# Patient Record
Sex: MALE | Race: WHITE
[De-identification: names, ages, dates, MRNs, and addresses within clinical notes are randomized per-mention and may not be internally consistent; named-entity substitution may affect disease eponyms.]

---

## 2018-06-15 ENCOUNTER — HOSPITAL ENCOUNTER (OUTPATIENT)
Dept: HOSPITAL 61 - KCIC | Age: 43
Discharge: HOME | End: 2018-06-15
Attending: NURSE PRACTITIONER
Payer: COMMERCIAL

## 2018-06-15 DIAGNOSIS — M12.88: ICD-10-CM

## 2018-06-15 DIAGNOSIS — M48.02: Primary | ICD-10-CM

## 2018-06-15 PROCEDURE — 72072 X-RAY EXAM THORAC SPINE 3VWS: CPT

## 2018-06-15 PROCEDURE — 72050 X-RAY EXAM NECK SPINE 4/5VWS: CPT

## 2018-09-21 ENCOUNTER — HOSPITAL ENCOUNTER (OUTPATIENT)
Dept: HOSPITAL 61 - KCIC MRI | Age: 43
Discharge: HOME | End: 2018-09-21
Attending: NURSE PRACTITIONER
Payer: COMMERCIAL

## 2018-09-21 DIAGNOSIS — M12.88: ICD-10-CM

## 2018-09-21 DIAGNOSIS — M48.02: ICD-10-CM

## 2018-09-21 DIAGNOSIS — M47.896: ICD-10-CM

## 2018-09-21 DIAGNOSIS — M47.892: ICD-10-CM

## 2018-09-21 DIAGNOSIS — M47.894: ICD-10-CM

## 2018-09-21 DIAGNOSIS — M51.24: Primary | ICD-10-CM

## 2018-09-21 DIAGNOSIS — M48.04: ICD-10-CM

## 2018-09-21 PROCEDURE — 72146 MRI CHEST SPINE W/O DYE: CPT

## 2018-09-21 NOTE — [facility]
EXAM: Thoracic spine MRI without contrast.

 

HISTORY: Pain.

 

TECHNIQUE: Multiplanar, multisequence magnetic resonance imaging of the 

thoracic spine was performed without contrast.

 

COMPARISON: Radiographs dated 6/15/2018.

 

FINDINGS: There is minimal thoracic curvature without significant 

scoliosis. There is no significant thoracic listhesis. The vertebral 

bodies are normal in height. There is edema due to endplate remodeling 

anteriorly at T9, T10 and L1. There are few small endplate Schmorl's 

nodes. There are few tiny osseous hemangiomas. No thoracic spinal cord 

lesion is seen. The conus terminates at L1-L2.

 

There is degenerative endplate remodeling with disc space narrowing, 

osteophytosis and a posterior disc protrusion with slight suspected 

inferior extrusion at C5-C6. The combination of this finding and facet and

uncovertebral arthropathy results in foraminal and central canal stenosis 

with deformation of the spinal cord. There are few additional disc bulges 

and protrusions at the cervical levels which are not well assessed on this

exam due to dedicated thoracic images.

 

There is degenerative change throughout the lumbar spine. There are 6 

nonrib-bearing lumbar vertebral segments. There are disc bulges or shallow

protrusions and there is endplate remodeling at multiple levels. This is 

also not formally assessed on this exam.

 

At T2-T3, there is minimal left facet arthropathy. There is no stenosis.

 

At T3-T4, there is minimal bilateral facet arthropathy. There is no 

stenosis.

 

At T4-T5, there is mild left greater than right facet arthropathy. There 

is no stenosis.

 

At T5-T6, there is mild bilateral facet arthropathy. There is no stenosis.

 

At T6-T7, there is a minimal disc bulge. There is mild bilateral facet 

arthropathy. There is no stenosis.

 

At T7-T8, there is a disc bulge and endplate remodeling. There is mild 

facet arthropathy. There is no stenosis.

 

At T8-T9, there is a left paracentral disc protrusion with slight inferior

extrusion. There is flattening of the left ventral aspect of the spinal 

cord without significant central canal stenosis. There is mild bilateral 

facet arthropathy. There is no significant foraminal stenosis.

 

At T9-T10, there is a right paracentral disc protrusion. This slightly 

flattens the right ventral aspect of the spinal cord without significant 

stenosis. There is mild bilateral facet arthropathy. There is no 

significant foraminal stenosis.

 

At T10-T11, there is a shallow right paracentral disc protrusion which 

flattens the right ventral aspect of the spinal cord. There is mild 

bilateral facet arthropathy. There is minimal central canal stenosis.

 

At T11-T12, there is a diffuse disc bulge. There is mild bilateral facet 

arthropathy. There is minimal central canal stenosis.

 

At T12-L1, there is a right paracentral disc protrusion superimposed on a 

disc bulge. There is mild bilateral facet arthropathy. There is minimal 

central canal stenosis.

 

IMPRESSION: 

1. Multilevel degenerative change within the thoracic spine, described in 

detail above. There are disc protrusions at multiple levels which slightly

deforms the ventral aspect of the spinal cord and contributing to minimal 

central canal stenosis. No severe stenosis or spinal cord signal 

abnormality is seen at the thoracic levels.

2. Multilevel degenerative change within the cervical spine, primarily at 

C5-C6. This results in foraminal and central canal stenosis with 

deformation of the spinal cord. This is not formally assessed on this 

exam.

3. Multilevel degenerative change within the lumbar spine. This is also 

not formally assessed on this exam. There are incidentally 6 

nonrib-bearing lumbar vertebral segments.

 

Electronically signed by: Loraine Cruz MD (9/21/2018 4:20 PM) [facility]

## 2018-10-02 ENCOUNTER — HOSPITAL ENCOUNTER (INPATIENT)
Dept: HOSPITAL 61 - ER | Age: 43
LOS: 2 days | Discharge: HOME | DRG: 637 | End: 2018-10-04
Attending: INTERNAL MEDICINE | Admitting: INTERNAL MEDICINE
Payer: SELF-PAY

## 2018-10-02 VITALS — DIASTOLIC BLOOD PRESSURE: 64 MMHG | SYSTOLIC BLOOD PRESSURE: 126 MMHG

## 2018-10-02 VITALS — BODY MASS INDEX: 28.51 KG/M2 | WEIGHT: 222.12 LBS | HEIGHT: 74 IN

## 2018-10-02 VITALS — DIASTOLIC BLOOD PRESSURE: 85 MMHG | SYSTOLIC BLOOD PRESSURE: 141 MMHG

## 2018-10-02 VITALS — SYSTOLIC BLOOD PRESSURE: 152 MMHG | DIASTOLIC BLOOD PRESSURE: 106 MMHG

## 2018-10-02 DIAGNOSIS — F10.20: ICD-10-CM

## 2018-10-02 DIAGNOSIS — E87.6: ICD-10-CM

## 2018-10-02 DIAGNOSIS — F41.9: ICD-10-CM

## 2018-10-02 DIAGNOSIS — Z87.891: ICD-10-CM

## 2018-10-02 DIAGNOSIS — G89.21: ICD-10-CM

## 2018-10-02 DIAGNOSIS — M62.82: ICD-10-CM

## 2018-10-02 DIAGNOSIS — E86.0: ICD-10-CM

## 2018-10-02 DIAGNOSIS — J30.9: ICD-10-CM

## 2018-10-02 DIAGNOSIS — K52.9: ICD-10-CM

## 2018-10-02 DIAGNOSIS — Z82.49: ICD-10-CM

## 2018-10-02 DIAGNOSIS — F12.90: ICD-10-CM

## 2018-10-02 DIAGNOSIS — N17.0: ICD-10-CM

## 2018-10-02 DIAGNOSIS — K57.90: ICD-10-CM

## 2018-10-02 DIAGNOSIS — E78.00: ICD-10-CM

## 2018-10-02 DIAGNOSIS — E78.5: ICD-10-CM

## 2018-10-02 DIAGNOSIS — E83.42: ICD-10-CM

## 2018-10-02 DIAGNOSIS — I10: ICD-10-CM

## 2018-10-02 DIAGNOSIS — E11.65: Primary | ICD-10-CM

## 2018-10-02 DIAGNOSIS — K21.9: ICD-10-CM

## 2018-10-02 LAB
% LYMPHS: 15 % (ref 24–48)
% MONOS: 2 % (ref 0–10)
% SEGS: 79 % (ref 35–66)
ALBUMIN SERPL-MCNC: 4.7 G/DL (ref 3.4–5)
ALBUMIN/GLOB SERPL: 1 {RATIO} (ref 1–1.7)
ALP SERPL-CCNC: 65 U/L (ref 46–116)
ALT SERPL-CCNC: 34 U/L (ref 16–63)
AMPHETAMINE/METHAMPHETAMINE: (no result)
ANION GAP SERPL CALC-SCNC: 11 MMOL/L (ref 6–14)
ANION GAP SERPL CALC-SCNC: 18 MMOL/L (ref 6–14)
APTT PPP: (no result) S
AST SERPL-CCNC: 34 U/L (ref 15–37)
BACTERIA #/AREA URNS HPF: (no result) /HPF
BARBITURATES UR-MCNC: (no result) UG/ML
BASOPHILS # BLD AUTO: 0.1 X10^3/UL (ref 0–0.2)
BASOPHILS NFR BLD AUTO: 2 % (ref 0–3)
BASOPHILS NFR BLD: 0 % (ref 0–3)
BENZODIAZ UR-MCNC: (no result) UG/L
BILIRUB SERPL-MCNC: 0.9 MG/DL (ref 0.2–1)
BILIRUB UR QL STRIP: NEGATIVE
BUN SERPL-MCNC: 25 MG/DL (ref 8–26)
BUN SERPL-MCNC: 30 MG/DL (ref 8–26)
BUN/CREAT SERPL: 18 (ref 6–20)
CALCIUM SERPL-MCNC: 10 MG/DL (ref 8.5–10.1)
CALCIUM SERPL-MCNC: 8.8 MG/DL (ref 8.5–10.1)
CANNABINOIDS UR-MCNC: (no result) UG/L
CHLORIDE SERPL-SCNC: 101 MMOL/L (ref 98–107)
CHLORIDE SERPL-SCNC: 94 MMOL/L (ref 98–107)
CK SERPL-CCNC: 1745 U/L (ref 39–308)
CO2 SERPL-SCNC: 27 MMOL/L (ref 21–32)
CO2 SERPL-SCNC: 29 MMOL/L (ref 21–32)
COCAINE UR-MCNC: (no result) NG/ML
CREAT SERPL-MCNC: 1.3 MG/DL (ref 0.7–1.3)
CREAT SERPL-MCNC: 1.7 MG/DL (ref 0.7–1.3)
EOSINOPHIL NFR BLD AUTO: 2 % (ref 0–5)
EOSINOPHIL NFR BLD: 0.1 X10^3/UL (ref 0–0.7)
EOSINOPHIL NFR BLD: 1 % (ref 0–3)
ERYTHROCYTE [DISTWIDTH] IN BLOOD BY AUTOMATED COUNT: 13.6 % (ref 11.5–14.5)
FIBRINOGEN PPP-MCNC: CLEAR MG/DL
GFR SERPLBLD BASED ON 1.73 SQ M-ARVRAT: 44 ML/MIN
GFR SERPLBLD BASED ON 1.73 SQ M-ARVRAT: 60 ML/MIN
GLOBULIN SER-MCNC: 4.7 G/DL (ref 2.2–3.8)
GLUCOSE SERPL-MCNC: 153 MG/DL (ref 70–99)
GLUCOSE SERPL-MCNC: 239 MG/DL (ref 70–99)
HCT VFR BLD CALC: 45 % (ref 39–53)
HGB BLD-MCNC: 15.9 G/DL (ref 13–17.5)
LIPASE: 428 U/L (ref 73–393)
LYMPHOCYTES # BLD: 2.6 X10^3/UL (ref 1–4.8)
LYMPHOCYTES NFR BLD AUTO: 15 % (ref 24–48)
MAGNESIUM SERPL-MCNC: 0.3 MG/DL (ref 1.8–2.4)
MAGNESIUM SERPL-MCNC: 1 MG/DL (ref 1.8–2.4)
MCH RBC QN AUTO: 34 PG (ref 25–35)
MCHC RBC AUTO-ENTMCNC: 35 G/DL (ref 31–37)
MCV RBC AUTO: 95 FL (ref 79–100)
METHADONE SERPL-MCNC: (no result) NG/ML
MONO #: 1.3 X10^3/UL (ref 0–1.1)
MONOCYTES NFR BLD: 8 % (ref 0–9)
NEUT #: 13 X10^3UL (ref 1.8–7.7)
NEUTROPHILS NFR BLD AUTO: 76 % (ref 31–73)
NITRITE UR QL STRIP: NEGATIVE
OPIATES UR-MCNC: (no result) NG/ML
PCP SERPL-MCNC: (no result) MG/DL
PH UR STRIP: 6 [PH]
PLATELET # BLD AUTO: 424 X10^3/UL (ref 140–400)
PLATELET # BLD EST: (no result) 10*3/UL
POTASSIUM SERPL-SCNC: 2.6 MMOL/L (ref 3.5–5.1)
POTASSIUM SERPL-SCNC: 3.2 MMOL/L (ref 3.5–5.1)
PROT SERPL-MCNC: 9.4 G/DL (ref 6.4–8.2)
PROT UR STRIP-MCNC: 100 MG/DL
RBC # BLD AUTO: 4.74 X10^6/UL (ref 4.3–5.7)
RBC #/AREA URNS HPF: 0 /HPF (ref 0–2)
SODIUM SERPL-SCNC: 139 MMOL/L (ref 136–145)
SODIUM SERPL-SCNC: 141 MMOL/L (ref 136–145)
UROBILINOGEN UR-MCNC: 1 MG/DL
WBC # BLD AUTO: 17.1 X10^3/UL (ref 4–11)
WBC #/AREA URNS HPF: (no result) /HPF (ref 0–4)

## 2018-10-02 PROCEDURE — 80307 DRUG TEST PRSMV CHEM ANLYZR: CPT

## 2018-10-02 PROCEDURE — 82550 ASSAY OF CK (CPK): CPT

## 2018-10-02 PROCEDURE — 80048 BASIC METABOLIC PNL TOTAL CA: CPT

## 2018-10-02 PROCEDURE — 83690 ASSAY OF LIPASE: CPT

## 2018-10-02 PROCEDURE — 80053 COMPREHEN METABOLIC PANEL: CPT

## 2018-10-02 PROCEDURE — G0479 DRUG TEST PRESUMP NOT OPT: HCPCS

## 2018-10-02 PROCEDURE — 85007 BL SMEAR W/DIFF WBC COUNT: CPT

## 2018-10-02 PROCEDURE — 84484 ASSAY OF TROPONIN QUANT: CPT

## 2018-10-02 PROCEDURE — 36415 COLL VENOUS BLD VENIPUNCTURE: CPT

## 2018-10-02 PROCEDURE — 74177 CT ABD & PELVIS W/CONTRAST: CPT

## 2018-10-02 PROCEDURE — 96365 THER/PROPH/DIAG IV INF INIT: CPT

## 2018-10-02 PROCEDURE — 71250 CT THORAX DX C-: CPT

## 2018-10-02 PROCEDURE — 83036 HEMOGLOBIN GLYCOSYLATED A1C: CPT

## 2018-10-02 PROCEDURE — 71045 X-RAY EXAM CHEST 1 VIEW: CPT

## 2018-10-02 PROCEDURE — 93005 ELECTROCARDIOGRAM TRACING: CPT

## 2018-10-02 PROCEDURE — 96375 TX/PRO/DX INJ NEW DRUG ADDON: CPT

## 2018-10-02 PROCEDURE — 85025 COMPLETE CBC W/AUTO DIFF WBC: CPT

## 2018-10-02 PROCEDURE — 83735 ASSAY OF MAGNESIUM: CPT

## 2018-10-02 PROCEDURE — 82962 GLUCOSE BLOOD TEST: CPT

## 2018-10-02 PROCEDURE — 84100 ASSAY OF PHOSPHORUS: CPT

## 2018-10-02 PROCEDURE — 96361 HYDRATE IV INFUSION ADD-ON: CPT

## 2018-10-02 PROCEDURE — 81001 URINALYSIS AUTO W/SCOPE: CPT

## 2018-10-02 RX ADMIN — POTASSIUM CHLORIDE SCH MLS/HR: 7.46 INJECTION, SOLUTION INTRAVENOUS at 19:11

## 2018-10-02 RX ADMIN — LOSARTAN POTASSIUM SCH MG: 50 TABLET ORAL at 20:30

## 2018-10-02 RX ADMIN — HYDROCODONE BITARTRATE AND ACETAMINOPHEN PRN TAB: 5; 325 TABLET ORAL at 22:28

## 2018-10-02 RX ADMIN — HEPARIN SODIUM SCH UNIT: 5000 INJECTION, SOLUTION INTRAVENOUS; SUBCUTANEOUS at 21:26

## 2018-10-02 RX ADMIN — METOPROLOL SUCCINATE SCH MG: 25 TABLET, EXTENDED RELEASE ORAL at 20:29

## 2018-10-02 RX ADMIN — HEPARIN SODIUM SCH UNIT: 5000 INJECTION, SOLUTION INTRAVENOUS; SUBCUTANEOUS at 16:03

## 2018-10-02 RX ADMIN — INSULIN LISPRO SCH UNITS: 100 INJECTION, SOLUTION INTRAVENOUS; SUBCUTANEOUS at 17:00

## 2018-10-02 RX ADMIN — POTASSIUM CHLORIDE SCH MLS/HR: 7.46 INJECTION, SOLUTION INTRAVENOUS at 20:22

## 2018-10-02 RX ADMIN — POTASSIUM CHLORIDE SCH MLS/HR: 7.46 INJECTION, SOLUTION INTRAVENOUS at 21:20

## 2018-10-02 RX ADMIN — CYCLOBENZAPRINE HYDROCHLORIDE PRN MG: 10 TABLET, FILM COATED ORAL at 22:28

## 2018-10-02 RX ADMIN — POTASSIUM CHLORIDE SCH MLS/HR: 7.46 INJECTION, SOLUTION INTRAVENOUS at 15:51

## 2018-10-02 RX ADMIN — POTASSIUM PHOSPHATE, MONOBASIC AND POTASSIUM PHOSPHATE, DIBASIC SCH MLS/HR: 224; 236 INJECTION, SOLUTION INTRAVENOUS at 22:29

## 2018-10-02 RX ADMIN — BENZONATATE SCH MG: 100 CAPSULE, LIQUID FILLED ORAL at 20:29

## 2018-10-02 RX ADMIN — ATORVASTATIN CALCIUM SCH MG: 20 TABLET, FILM COATED ORAL at 20:30

## 2018-10-02 NOTE — RAD
CT ABD PELV W/ IV CONTRST ONLY

 

Indication: DIFFUSE ABDOMINAL PAIN. IV OMNI 300 75 MLS. PREVIOUS.

 

Exposure: One or more of the following individualized dose reduction 

techniques were utilized for this examination:  1. Automated exposure 

control  2. Adjustment of the mA and/or kV according to patient size  3. 

Use of iterative reconstruction technique.

 

Comparison: Axial images from June 13, 2007

Contrast: Intravenous contrast was given. No oral contrast per request.

 

FINDINGS:

Lower thorax: There are 3 flat nodules in the right lower lobe just above 

the diaphragm, largest measures 14 mm in diameter. Not seen on prior 

study.

 

Liver: Unremarkable

Spleen: Unremarkable

Pancreas: Unremarkable

 

Adrenals: No evidence of mass.

Kidneys: No obvious mass.

Urinary tracts: No hydronephrosis.

 

Gallbladder: No calcified stone

Lymph nodes: No significant enlargement

 

Vessels:

*  Aorta: Nonaneurysmal

*  Major aortic branches: Grossly patent.

*  Portal venous: Patent

 

GI tract: No evidence of acute colitis. No evidence of bowel obstruction. 

Colonic diverticula Appendix is normal. Small density within the appendix 

probably contrast from a prior procedure or less likely small 

appendicoliths.

 

Reproductive organs:No evidence of mass.

Urinary bladder: Unremarkable.

Peritoneum: No evidence of pneumoperitoneum. No free fluid.

Abdominal wall:Unremarkable

 

Spine: Mild degenerative spondylosis.

Bones: No destructive process identified.

 

IMPRESSION: 

1. Several flat nodules in the right lower lobe, 14 mm diameter. Not 

present in 2007. As per Fleischner Society guidelines, consider follow-up 

CT chest in 3 months or PET/CT.

2. No acute findings in the abdomen or pelvis.

 

Electronically signed by: Phan Worley MD (10/2/2018 12:20 PM) [facility]

## 2018-10-02 NOTE — EKG
[facility]

              [address] [place] [address]

Test Date:    2018-10-02               Test Time:    09:52:37

Pat Name:     RORY MONTEZ             Department:   

Patient ID:   [id number]           Room:          

Gender:                               Technician:   SC

:          1975               Requested By: RORY WAY

Order Number: [id number]           Reading MD:   Gregg Benjamin MD

                                 Measurements

Intervals                              Axis          

Rate:         74                       P:            52

TN:           146                      QRS:          24

QRSD:         82                       T:            41

QT:           400                                    

QTc:          449                                    

                           Interpretive Statements

SINUS RHYTHM

T ABNORMALITY IN ANTERIOR LEADS

NON SPECIFIC ST DEPRESSION

ABNORMAL ECG



Electronically Signed On 10-2-2018 10:38:42 CDT by Gregg Benjamin MD

## 2018-10-02 NOTE — CONS
DATE OF CONSULTATION:  



PULMONARY CONSULTATION



ATTENDING PHYSICIAN:  Dr. Staton.



REASON FOR CONSULTATION:  Lung nodule.



HISTORY OF PRESENT ILLNESS:  The patient is a 43-year-old who has a history of

diabetes and hypertension.  He came to the Emergency Room with nausea, vomiting

and weakness.  The patient underwent CT abdomen and pelvis, which was reviewed

by me.  There are few irregular nodules seen in the right lower lobe.  The

largest of them is 14 mm.  He has no known malignancy.  He has no evidence of

any malignancy in the abdomen and pelvis.  The patient smoked for about 10 years

before quitting.  I have been asked to see him for further evaluation.  No

chronic cough, no hemoptysis.  No headaches.  No focal weakness.  No skin rash.



PAST MEDICAL HISTORY:  Significant for history of diabetes and hypertension.



PAST SURGICAL HISTORY:  Joint surgery.



FAMILY HISTORY:  Hypertension.



SOCIAL HISTORY:  Smoked for about 10 years and history of alcohol abuse.



ALLERGIES:  None.



MEDICATIONS:  Reviewed as listed in the MRAD.



REVIEW OF SYSTEMS:  As discussed in my history of present illness, otherwise

noncontributory.  All systems that were negative were reviewed as well.



PHYSICAL EXAMINATION:

VITAL SIGNS:  Stable.  Pulse ox 100% on room air.

NECK:  Supple.

LUNGS:  Clear.

CARDIOVASCULAR:  Regular rate.

ABDOMEN:  Soft.

EXTREMITIES:  With no pitting edema.



LABORATORY DATA:  Reviewed.  White cell count 17.1, hemoglobin 15.9.  Potassium

was 2.6.  It has been corrected.  BUN and creatinine 30 and 1.7.



IMPRESSION:

1.  Right lower lobe irregular nodules seen on the CT abdomen and pelvis. 

Largest of them 14 mm in size.  This is a patient who is at low risk for lung

malignancy, smoked only for 10 years and no other evidence of any malignancy

outside of lungs.  These nodules need to be followed up.  I will obtain a full

CT chest to have a better view of the entire chest and to look for other

nodules.

2.  Diabetes, under poor control.

3.  Hypokalemia.

4.  Acute kidney injury secondary to dehydration.



RECOMMENDATIONS:

1.  Obtain noncontrast CT chest.

2.  If there are no other additional nodules or any larger nodules, then a

followup CT chest in 4 months would be reasonable.

3.  Management of diabetes and electrolyte imbalance per PCP.

 



______________________________

ENID DUKE MD



DR:  JAVID/isaias  JOB#:  [id number] / [id number]

DD:  10/02/2018 15:31  DT:  10/02/2018 15:51
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
## 2018-10-02 NOTE — PDOC2
GI CONSULT


Reason For Consult:


N/v





HPI:


HPI:


42 y/o male admitted through ER.  Began feeling ill on Friday - just didn't 

feel right, didn't eat much.  Denies precipitating events.  Persisted to 

Saturday, then ate steak for dinner, then started w/ n/v.  Unable to take much 

PO since, still vomiting some liquids (last in ER).  Associated w/ diffuse 

abdominal soreness (worse w/ movement, "cramping"), and also had diarrhea x 2 (

watery) on Sunday and Monday.  Feels hot and sweaty sometimes.  No hematemesis, 

hematochezia, or melena.  





H/o GERD on OTC Prilosec QD.  No dysphagia.  Prior to onset of symptoms, 

appetite and weight stable.  Has a "weak stomach" - has diarrhea from time to 

time but typically no issues w/ abd pain or n/v.  H/o DM - has had trouble 

tolerating PO medications for this, so has been off all diabetic treatment for 

about 1 month - says he has an online coupon for a new medication from PCP.  Can

't remember what last A1c was, but thinks "better."  Not checking blood sugar 

at home because the batteries are dead in his machine.  No GB, liver, or 

pancreas history.





Significant labs: WBC 17, plt 424, K 2.6, BUN 30, Cr 1.7, glucose 239, CK 1745, 

lipase 428.


CT unrevealing for acute issue, though noted RLL nodules, diverticulosis, and 

small density within the appendix (?contrast from a prior procedure or less 

likely small appendicolith).





PMH:


PMH:


HTN, HLD, DM, GERD, diverticulosis, allergic rhinitis, DDD, right jaw/foot/knee 

surgeries





Social History:


Smoke:  No


ALCOHOL:  other (can't specify - drinks 4 days a week, sometimes "a couple" and 

sometimes "gets drunk")


Drugs:  None





ROS:





GEN: +sweats


HEENT: Denies blurred vision, sore throat


CV: Denies chest pain


RESP: Denies shortness of air, cough


GI: Per HPI


: Denies hematuria, dysuria


ENDO: Denies weight changes


NEURO: Denies confusion, dizziness


MSK: Denies weakness, joint pain/swelling


SKIN: Denies jaundice, pruritus





Vitals:


Vitals:





 Vital Signs








  Date Time  Temp Pulse Resp B/P (MAP) Pulse Ox O2 Delivery O2 Flow Rate FiO2


 


10/2/18 13:34  80 16  100   


 


10/2/18 09:35 98.4   136/78 (97)  Room Air  





 98.4       











Labs:


Labs:





Laboratory Tests








Test


 10/2/18


09:41 10/2/18


10:20 10/2/18


10:25


 


Glucose (Fingerstick)


 242 mg/dL


(70-99) 


 





 


Urine Collection Type  Unknown  


 


Urine Color  Shazia  


 


Urine Clarity  Clear  


 


Urine pH  6.0  


 


Urine Specific Gravity  1.025  


 


Urine Protein


 


 100 mg/dL


(NEG-TRACE) 





 


Urine Glucose (UA)


 


 100 mg/dL


(NEG) 





 


Urine Ketones (Stick)


 


 Negative mg/dL


(NEG) 





 


Urine Blood  Negative (NEG)  


 


Urine Nitrite  Negative (NEG)  


 


Urine Bilirubin  Negative (NEG)  


 


Urine Urobilinogen Dipstick


 


 1.0 mg/dL (0.2


mg/dL) 





 


Urine Leukocyte Esterase  Negative (NEG)  


 


Urine RBC  0 /HPF (0-2)  


 


Urine WBC


 


 5-10 /HPF


(0-4) 





 


Urine Bacteria


 


 Mod /HPF


(0-FEW) 





 


White Blood Count


 


 


 17.1 x10^3/uL


(4.0-11.0)


 


Red Blood Count


 


 


 4.74 x10^6/uL


(4.30-5.70)


 


Hemoglobin


 


 


 15.9 g/dL


(13.0-17.5)


 


Hematocrit


 


 


 45.0 %


(39.0-53.0)


 


Mean Corpuscular Volume   95 fL () 


 


Mean Corpuscular Hemoglobin   34 pg (25-35) 


 


Mean Corpuscular Hemoglobin


Concent 


 


 35 g/dL


(31-37)


 


Red Cell Distribution Width


 


 


 13.6 %


(11.5-14.5)


 


Platelet Count


 


 


 424 x10^3/uL


(140-400)


 


Neutrophils (%) (Auto)   76 % (31-73) 


 


Lymphocytes (%) (Auto)   15 % (24-48) 


 


Monocytes (%) (Auto)   8 % (0-9) 


 


Eosinophils (%) (Auto)   1 % (0-3) 


 


Basophils (%) (Auto)   0 % (0-3) 


 


Neutrophils # (Auto)


 


 


 13.0 x10^3uL


(1.8-7.7)


 


Lymphocytes # (Auto)


 


 


 2.6 x10^3/uL


(1.0-4.8)


 


Monocytes # (Auto)


 


 


 1.3 x10^3/uL


(0.0-1.1)


 


Eosinophils # (Auto)


 


 


 0.1 x10^3/uL


(0.0-0.7)


 


Basophils # (Auto)


 


 


 0.1 x10^3/uL


(0.0-0.2)


 


Segmented Neutrophils %   79 % (35-66) 


 


Lymphocytes %   15 % (24-48) 


 


Monocytes %   2 % (0-10) 


 


Eosinophils %   2 % (0-5) 


 


Basophils %   2 % (0-3) 


 


Platelet Estimate


 


 


 Increased


(ADEQUATE)


 


Large Platelets   Few 


 


Giant Platelets   Occ 


 


Sodium Level


 


 


 139 mmol/L


(136-145)


 


Potassium Level


 


 


 2.6 mmol/L


(3.5-5.1)


 


Chloride Level


 


 


 94 mmol/L


()


 


Carbon Dioxide Level


 


 


 27 mmol/L


(21-32)


 


Anion Gap   18 (6-14) 


 


Blood Urea Nitrogen


 


 


 30 mg/dL


(8-26)


 


Creatinine


 


 


 1.7 mg/dL


(0.7-1.3)


 


Estimated GFR


(Cockcroft-Gault) 


 


 44.0 





 


BUN/Creatinine Ratio   18 (6-20) 


 


Glucose Level


 


 


 239 mg/dL


(70-99)


 


Calcium Level


 


 


 10.0 mg/dL


(8.5-10.1)


 


Magnesium Level


 


 


 0.3 mg/dL


(1.8-2.4)


 


Total Bilirubin


 


 


 0.9 mg/dL


(0.2-1.0)


 


Aspartate Amino Transf


(AST/SGOT) 


 


 34 U/L (15-37) 





 


Alanine Aminotransferase


(ALT/SGPT) 


 


 34 U/L (16-63) 





 


Alkaline Phosphatase


 


 


 65 U/L


()


 


Creatine Kinase


 


 


 1745 U/L


()


 


Troponin I Quantitative


 


 


 < 0.017 ng/mL


(0.000-0.055)


 


Total Protein


 


 


 9.4 g/dL


(6.4-8.2)


 


Albumin


 


 


 4.7 g/dL


(3.4-5.0)


 


Albumin/Globulin Ratio   1.0 (1.0-1.7) 


 


Lipase


 


 


 428 U/L


()











Allergies:


Coded Allergies:  


     No Known Drug Allergies (Unverified , 10/2/18)





Medications:





Current Medications








 Medications


  (Trade)  Dose


 Ordered  Sig/Nadege


 Route


 PRN Reason  Start Time


 Stop Time Status Last Admin


Dose Admin


 


 Ondansetron HCl


  (Zofran)  4 mg  1X  ONCE


 IV


   10/2/18 09:45


 10/2/18 09:55 DC 10/2/18 10:41





 


 Morphine Sulfate


  (Morphine


 Sulfate)  4 mg  1X  ONCE


 IV


   10/2/18 09:45


 10/2/18 09:55 DC 10/2/18 10:41





 


 Sodium Chloride  1,000 ml @ 


 1,000 mls/hr  1X  ONCE


 IV


   10/2/18 10:15


 10/2/18 11:14 DC 10/2/18 10:41





 


 Iohexol


  (Omnipaque 300


 Mg/ml)  60 ml  1X  ONCE


 IV


   10/2/18 11:30


 10/2/18 11:31 DC 10/2/18 11:30





 


 Sodium Chloride  1,000 ml @ 


 1,000 mls/hr  1X  ONCE


 IV


   10/2/18 11:30


 10/2/18 12:29 DC 10/2/18 12:56





 


 Potassium Chloride


  (Klor-Con)  40 meq  1X  ONCE


 PO


   10/2/18 11:30


 10/2/18 11:31 DC 10/2/18 11:43





 


 Magnesium Sulfate  50 ml @ 25


 mls/hr  1X  ONCE


 IV


   10/2/18 12:45


 10/2/18 14:44 DC 10/2/18 12:57














Imaging:


Imaging:


CXR


Impression:


No active disease.





CT A/P


FINDINGS:


Lower thorax: There are 3 flat nodules in the right lower lobe just above the 

diaphragm, largest measures 14 mm in diameter. Not seen on prior study.


Liver: Unremarkable


Spleen: Unremarkable


Pancreas: Unremarkable


Adrenals: No evidence of mass.


Kidneys: No obvious mass.


Urinary tracts: No hydronephrosis.


Gallbladder: No calcified stone


Lymph nodes: No significant enlargement


Vessels:


*  Aorta: Nonaneurysmal


*  Major aortic branches: Grossly patent.


*  Portal venous: Patent


 GI tract: No evidence of acute colitis. No evidence of bowel obstruction. 

Colonic diverticula Appendix is normal. Small density within the appendix 

probably contrast from a prior procedure or less likely small appendicoliths.


Reproductive organs:No evidence of mass.


Urinary bladder: Unremarkable.


Peritoneum: No evidence of pneumoperitoneum. No free fluid.


Abdominal wall:Unremarkable


Spine: Mild degenerative spondylosis.


Bones: No destructive process identified.





PE:





GEN: uncomfortable


HEENT: Atraumatic, PERRL


LUNGS: CTAB


HEART: RRR


ABD: NABS, soft, diffusely uncomfortable - muscle?


EXTREMITY: No edema


SKIN: No rashes, no jaundice


NEURO/PSYCH: A & O 3, a bit shaky





A/P:


A/P:


N/v, abd pain, diarrhea, sweats


-?prodrome last week, has been unable to take much PO





Leukocytosis, hypokalemia, TYLER





GERD - controlled w/ PPI QD





CRC screen - average risk





Diverticulosis





Elevated lipase - unclear significance





DM - off treatment x 1 month





--


?viral/infectious complicated w/ recently untreated diabetes (?gastroparesis)


Supportive care - he wants to try ADA diet.


Would continue PPI QD (will stop H2 blocker).


Observe for diarrhea - no stools today.











JUS BURCH Oct 2, 2018 15:09

## 2018-10-02 NOTE — PHYS DOC
Past Medical History


Past Medical History:  Anxiety, Diabetes-Type II, GERD, High Cholesterol, 

Hypertension


Past Surgical History:  Other


Additional Past Surgical Histo:  right jaw; right foot; right knee


Alcohol Use:  Heavy


Drug Use:  Marijuana





Adult General


Chief Complaint


Chief Complaint:  DIZZY/LIGHT HEADED





HPI


HPI





Patient is a 43  year old male with a history of diabetes and hypertension whom 

presents to the ED complaining of vomiting 3 days ago. Patient states he had 

vomiting over the last 3 days. Too many episodes to count. States it started on 

Sunday. States that he usually drinks 3-4 beers a day. States he can't keep 

anything down and is vomiting everything up. States he has not been taking his 

diabetic medication. Blood sugar is 242 in the ED. Denies chest pain, shortness 

of breath, weakness, syncope, fever, headache, neck pain or photophobia.





Review of Systems


Review of Systems





Constitutional: Denies fever or chills []


Eyes: Denies change in visual acuity, redness, or eye pain []


HENT: Denies nasal congestion or sore throat []


Respiratory: Denies cough or shortness of breath []


Cardiovascular: No additional information not addressed in HPI []


GI: Complains of abdominal pain, nausea and vomiting. Denies bloody stools or 

diarrhea []


: Denies dysuria or hematuria []


Musculoskeletal: Denies back pain or joint pain []


Integument: Denies rash or skin lesions []


Neurologic: Denies headache, focal weakness or sensory changes []





All other systems were reviewed and found to be within normal limits, except as 

documented in this note.





Current Medications


Current Medications





Current Medications








 Medications


  (Trade)  Dose


 Ordered  Sig/Nadege  Start Time


 Stop Time Status Last Admin


Dose Admin


 


 Info


  (CONTRAST GIVEN


 -- Rx MONITORING)  1 each  PRN DAILY  PRN  10/2/18 11:30


 10/4/18 11:29 DC  





 


 Iohexol


  (Omnipaque 300


 Mg/ml)  60 ml  1X  ONCE  10/2/18 11:30


 10/2/18 11:31 DC 10/2/18 11:30


60 ML


 


 Morphine Sulfate


  (Morphine


 Sulfate)  4 mg  1X  ONCE  10/2/18 09:45


 10/2/18 09:55 DC 10/2/18 10:41


4 MG


 


 Ondansetron HCl


  (Zofran)  4 mg  1X  ONCE  10/2/18 09:45


 10/2/18 09:55 DC 10/2/18 10:41


4 MG


 


 Potassium Chloride


  (Klor-Con)  40 meq  1X  ONCE  10/2/18 11:30


 10/2/18 11:31 DC 10/2/18 11:43


40 MEQ


 


 Sodium Chloride  1,000 ml @ 


 1,000 mls/hr  1X  ONCE  10/2/18 11:30


 10/2/18 12:29 DC 10/2/18 12:56


1,000 MLS/HR











Allergies


Allergies





Allergies








Coded Allergies Type Severity Reaction Last Updated Verified


 


  No Known Drug Allergies    10/2/18 No











Physical Exam


Physical Exam





Constitutional: Well developed, well nourished, no acute distress, non-toxic 

appearance. []


HENT: Normocephalic, atraumatic, oropharynx moist


Eyes: PERRLA, EOMI, conjunctiva normal, no discharge. [] 


Neck: Normal range of motion, no tenderness, supple, no stridor. [] 


Cardiovascular:Heart rate regular rhythm, no murmur []


Lungs & Thorax:  Bilateral breath sounds clear to auscultation []


Abdomen: Bowel sounds normal, soft, mild diffuse abdominal tenderness, no masses

, no pulsatile masses. [] 


Skin: Warm, dry, no erythema, no rash. [] 


Back: No tenderness, no CVA tenderness. [] 


Extremities: No tenderness, no cyanosis, no clubbing, ROM intact, no edema. [] 


Neurologic: Alert and oriented X 3, normal motor function, normal sensory 

function, no focal deficits noted. []


Psychologic: Affect normal, judgement normal, mood normal. []





Current Patient Data


Vital Signs





 Vital Signs








  Date Time  Temp Pulse Resp B/P (MAP) Pulse Ox O2 Delivery O2 Flow Rate FiO2


 


10/2/18 12:34  73 16  100   


 


10/2/18 09:35 98.4   136/78 (97)  Room Air  





 98.4       








Lab Values





 Laboratory Tests








Test


 10/2/18


09:41 10/2/18


10:20 10/2/18


10:25


 


Glucose (Fingerstick)


 242 mg/dL


(70-99)  H 


 





 


Urine Collection Type  Unknown   


 


Urine Color  Shazia   


 


Urine Clarity  Clear   


 


Urine pH  6.0   


 


Urine Specific Gravity  1.025   


 


Urine Protein


 


 100 mg/dL


(NEG-TRACE) 





 


Urine Glucose (UA)


 


 100 mg/dL


(NEG) 





 


Urine Ketones (Stick)


 


 Negative mg/dL


(NEG) 





 


Urine Blood


 


 Negative (NEG)


 





 


Urine Nitrite


 


 Negative (NEG)


 





 


Urine Bilirubin


 


 Negative (NEG)


 





 


Urine Urobilinogen Dipstick


 


 1.0 mg/dL (0.2


mg/dL) 





 


Urine Leukocyte Esterase


 


 Negative (NEG)


 





 


Urine RBC  0 /HPF (0-2)   


 


Urine WBC


 


 5-10 /HPF


(0-4) 





 


Urine Bacteria


 


 Mod /HPF


(0-FEW) 





 


White Blood Count


 


 


 17.1 x10^3/uL


(4.0-11.0)  H


 


Red Blood Count


 


 


 4.74 x10^6/uL


(4.30-5.70)


 


Hemoglobin


 


 


 15.9 g/dL


(13.0-17.5)


 


Hematocrit


 


 


 45.0 %


(39.0-53.0)


 


Mean Corpuscular Volume


 


 


 95 fL ()





 


Mean Corpuscular Hemoglobin   34 pg (25-35)  


 


Mean Corpuscular Hemoglobin


Concent 


 


 35 g/dL


(31-37)


 


Red Cell Distribution Width


 


 


 13.6 %


(11.5-14.5)


 


Platelet Count


 


 


 424 x10^3/uL


(140-400)  H


 


Neutrophils (%) (Auto)   76 % (31-73)  H


 


Lymphocytes (%) (Auto)   15 % (24-48)  L


 


Monocytes (%) (Auto)   8 % (0-9)  


 


Eosinophils (%) (Auto)   1 % (0-3)  


 


Basophils (%) (Auto)   0 % (0-3)  


 


Neutrophils # (Auto)


 


 


 13.0 x10^3uL


(1.8-7.7)  H


 


Lymphocytes # (Auto)


 


 


 2.6 x10^3/uL


(1.0-4.8)


 


Monocytes # (Auto)


 


 


 1.3 x10^3/uL


(0.0-1.1)  H


 


Eosinophils # (Auto)


 


 


 0.1 x10^3/uL


(0.0-0.7)


 


Basophils # (Auto)


 


 


 0.1 x10^3/uL


(0.0-0.2)


 


Segmented Neutrophils %   79 % (35-66)  H


 


Lymphocytes %   15 % (24-48)  L


 


Monocytes %   2 % (0-10)  


 


Eosinophils %   2 % (0-5)  


 


Basophils %   2 % (0-3)  


 


Platelet Estimate


 


 


 Increased


(ADEQUATE)


 


Large Platelets   Few  


 


Giant Platelets   Occ  


 


Sodium Level


 


 


 139 mmol/L


(136-145)


 


Potassium Level


 


 


 2.6 mmol/L


(3.5-5.1)  *L


 


Chloride Level


 


 


 94 mmol/L


()  L


 


Carbon Dioxide Level


 


 


 27 mmol/L


(21-32)


 


Anion Gap   18 (6-14)  H


 


Blood Urea Nitrogen


 


 


 30 mg/dL


(8-26)  H


 


Creatinine


 


 


 1.7 mg/dL


(0.7-1.3)  H


 


Estimated GFR


(Cockcroft-Gault) 


 


 44.0  





 


BUN/Creatinine Ratio   18 (6-20)  


 


Glucose Level


 


 


 239 mg/dL


(70-99)  H


 


Hemoglobin A1c


 


 


 7.3 %


(4.8-5.6)  H


 


Calcium Level


 


 


 10.0 mg/dL


(8.5-10.1)


 


Phosphorus Level


 


 


 1.9 mg/dL


(2.6-4.7)  L


 


Magnesium Level


 


 


 0.3 mg/dL


(1.8-2.4)  L


 


Total Bilirubin


 


 


 0.9 mg/dL


(0.2-1.0)


 


Aspartate Amino Transferase


(AST) 


 


 34 U/L (15-37)





 


Alanine Aminotransferase (ALT)


 


 


 34 U/L (16-63)





 


Alkaline Phosphatase


 


 


 65 U/L


()


 


Creatine Kinase


 


 


 1745 U/L


()  H


 


Troponin I Quantitative


 


 


 < 0.017 ng/mL


(0.000-0.055)


 


Total Protein


 


 


 9.4 g/dL


(6.4-8.2)  H


 


Albumin


 


 


 4.7 g/dL


(3.4-5.0)


 


Albumin/Globulin Ratio   1.0 (1.0-1.7)  


 


Lipase


 


 


 428 U/L


()  H





 Laboratory Tests


10/2/18 10:25








 Laboratory Tests


10/2/18 10:25














EKG


EKG


[]





Radiology/Procedures


Radiology/Procedures


CT ABD PELV W/ IV CONTRST ONLY


 


Indication: DIFFUSE ABDOMINAL PAIN. IV OMNI 300 75 MLS. PREVIOUS.


 


Exposure: One or more of the following individualized dose reduction 


techniques were utilized for this examination:  1. Automated exposure 


control  2. Adjustment of the mA and/or kV according to patient size  3. 


Use of iterative reconstruction technique.


 


Comparison: Axial images from June 13, 2007


Contrast: Intravenous contrast was given. No oral contrast per request.


 


FINDINGS:


Lower thorax: There are 3 flat nodules in the right lower lobe just above 


the diaphragm, largest measures 14 mm in diameter. Not seen on prior 


study.


 


Liver: Unremarkable


Spleen: Unremarkable


Pancreas: Unremarkable


 


Adrenals: No evidence of mass.


Kidneys: No obvious mass.


Urinary tracts: No hydronephrosis.


 


Gallbladder: No calcified stone


Lymph nodes: No significant enlargement


 


Vessels:


*  Aorta: Nonaneurysmal


*  Major aortic branches: Grossly patent.


*  Portal venous: Patent


 


GI tract: No evidence of acute colitis. No evidence of bowel obstruction. 


Colonic diverticula Appendix is normal. Small density within the appendix 


probably contrast from a prior procedure or less likely small 


appendicoliths.


 


Reproductive organs:No evidence of mass.


Urinary bladder: Unremarkable.


Peritoneum: No evidence of pneumoperitoneum. No free fluid.


Abdominal wall:Unremarkable


 


Spine: Mild degenerative spondylosis.


Bones: No destructive process identified.


 


IMPRESSION: 


1. Several flat nodules in the right lower lobe, 14 mm diameter. Not 


present in 2007. As per Fleischner Society guidelines, consider follow-up 


CT chest in 3 months or PET/CT.


2. No acute findings in the abdomen or pelvis.[]





Course & Med Decision Making


Course & Med Decision Making


Pertinent Labs and Imaging studies reviewed. (See chart for details)





Potassium and magnesium ordered in the ED. Patient feeling better with IV 

fluids and analgesics.


[]Discussed case with hospitalist, Dr. Staton. Agrees to admission and further 

management of patient. Patient stable for admission.





Dragon Disclaimer


Dragon Disclaimer


This electronic medical record was generated, in whole or in part, using a 

voice recognition dictation system.





Departure


Departure


Impression:  


 Primary Impression:  


 Rhabdomyolysis


 Additional Impressions:  


 Hypokalemia


 Hypomagnesemia


 TYLER (acute kidney injury)


 Vomiting


Disposition:  09 ADMITTED AS INPATIENT


Condition:  STABLE


Referrals:  


NAHOMI LOWERY (PCP)


Scripts


Bethanechol Chloride (BETHANECHOL CHLORIDE) 10 Mg Tablet


10 MG PO PRN TID PRN for NAUSEA for 30 Days, #90 TAB 2 Refills


   Before meals for diabetic gastroparesis


   Prov: CHRISSY RATLIFF MD         10/4/18





Attending Signature


Attending Signature


I have reviewed the PA/NP's note and plan of care. I was available for 

consultation as needed during the patient's visit in the emergency department. 

I agree with the clinical impression, plan, and disposition.





Problem Qualifiers











RORY WAY Oct 2, 2018 10:13


DEDE ORELLANA DO Oct 5, 2018 11:55

## 2018-10-03 VITALS — SYSTOLIC BLOOD PRESSURE: 123 MMHG | DIASTOLIC BLOOD PRESSURE: 81 MMHG

## 2018-10-03 VITALS — SYSTOLIC BLOOD PRESSURE: 134 MMHG | DIASTOLIC BLOOD PRESSURE: 89 MMHG

## 2018-10-03 VITALS — DIASTOLIC BLOOD PRESSURE: 81 MMHG | SYSTOLIC BLOOD PRESSURE: 125 MMHG

## 2018-10-03 VITALS — SYSTOLIC BLOOD PRESSURE: 143 MMHG | DIASTOLIC BLOOD PRESSURE: 93 MMHG

## 2018-10-03 VITALS — SYSTOLIC BLOOD PRESSURE: 140 MMHG | DIASTOLIC BLOOD PRESSURE: 108 MMHG

## 2018-10-03 VITALS — DIASTOLIC BLOOD PRESSURE: 84 MMHG | SYSTOLIC BLOOD PRESSURE: 137 MMHG

## 2018-10-03 LAB
ALBUMIN SERPL-MCNC: 3.6 G/DL (ref 3.4–5)
ALBUMIN/GLOB SERPL: 0.9 {RATIO} (ref 1–1.7)
ALP SERPL-CCNC: 61 U/L (ref 46–116)
ALT SERPL-CCNC: 28 U/L (ref 16–63)
ANION GAP SERPL CALC-SCNC: 11 MMOL/L (ref 6–14)
AST SERPL-CCNC: 32 U/L (ref 15–37)
BASOPHILS # BLD AUTO: 0.1 X10^3/UL (ref 0–0.2)
BASOPHILS NFR BLD: 1 % (ref 0–3)
BILIRUB SERPL-MCNC: 0.8 MG/DL (ref 0.2–1)
BUN SERPL-MCNC: 21 MG/DL (ref 8–26)
BUN/CREAT SERPL: 18 (ref 6–20)
CALCIUM SERPL-MCNC: 8.3 MG/DL (ref 8.5–10.1)
CHLORIDE SERPL-SCNC: 102 MMOL/L (ref 98–107)
CK SERPL-CCNC: 1129 U/L (ref 39–308)
CO2 SERPL-SCNC: 29 MMOL/L (ref 21–32)
CREAT SERPL-MCNC: 1.2 MG/DL (ref 0.7–1.3)
EOSINOPHIL NFR BLD: 0.1 X10^3/UL (ref 0–0.7)
EOSINOPHIL NFR BLD: 1 % (ref 0–3)
ERYTHROCYTE [DISTWIDTH] IN BLOOD BY AUTOMATED COUNT: 13.6 % (ref 11.5–14.5)
GFR SERPLBLD BASED ON 1.73 SQ M-ARVRAT: 65.8 ML/MIN
GLOBULIN SER-MCNC: 3.8 G/DL (ref 2.2–3.8)
GLUCOSE SERPL-MCNC: 173 MG/DL (ref 70–99)
HBA1C MFR BLD: 7.3 % (ref 4.8–5.6)
HCT VFR BLD CALC: 40.5 % (ref 39–53)
HGB BLD-MCNC: 14.4 G/DL (ref 13–17.5)
LIPASE: 385 U/L (ref 73–393)
LYMPHOCYTES # BLD: 3.1 X10^3/UL (ref 1–4.8)
LYMPHOCYTES NFR BLD AUTO: 29 % (ref 24–48)
MAGNESIUM SERPL-MCNC: 2 MG/DL (ref 1.8–2.4)
MCH RBC QN AUTO: 34 PG (ref 25–35)
MCHC RBC AUTO-ENTMCNC: 36 G/DL (ref 31–37)
MCV RBC AUTO: 95 FL (ref 79–100)
MONO #: 0.8 X10^3/UL (ref 0–1.1)
MONOCYTES NFR BLD: 7 % (ref 0–9)
NEUT #: 6.6 X10^3UL (ref 1.8–7.7)
NEUTROPHILS NFR BLD AUTO: 62 % (ref 31–73)
PLATELET # BLD AUTO: 334 X10^3/UL (ref 140–400)
POTASSIUM SERPL-SCNC: 3 MMOL/L (ref 3.5–5.1)
PROT SERPL-MCNC: 7.4 G/DL (ref 6.4–8.2)
RBC # BLD AUTO: 4.25 X10^6/UL (ref 4.3–5.7)
SODIUM SERPL-SCNC: 142 MMOL/L (ref 136–145)
WBC # BLD AUTO: 10.7 X10^3/UL (ref 4–11)

## 2018-10-03 RX ADMIN — INSULIN LISPRO SCH UNITS: 100 INJECTION, SOLUTION INTRAVENOUS; SUBCUTANEOUS at 17:00

## 2018-10-03 RX ADMIN — ATORVASTATIN CALCIUM SCH MG: 20 TABLET, FILM COATED ORAL at 21:13

## 2018-10-03 RX ADMIN — BENZONATATE SCH MG: 100 CAPSULE, LIQUID FILLED ORAL at 21:00

## 2018-10-03 RX ADMIN — CYCLOBENZAPRINE HYDROCHLORIDE PRN MG: 10 TABLET, FILM COATED ORAL at 06:34

## 2018-10-03 RX ADMIN — LOSARTAN POTASSIUM SCH MG: 50 TABLET ORAL at 21:13

## 2018-10-03 RX ADMIN — PANTOPRAZOLE SODIUM SCH MG: 40 TABLET, DELAYED RELEASE ORAL at 08:29

## 2018-10-03 RX ADMIN — HEPARIN SODIUM SCH UNIT: 5000 INJECTION, SOLUTION INTRAVENOUS; SUBCUTANEOUS at 12:48

## 2018-10-03 RX ADMIN — METOPROLOL SUCCINATE SCH MG: 25 TABLET, EXTENDED RELEASE ORAL at 21:12

## 2018-10-03 RX ADMIN — POTASSIUM PHOSPHATE, MONOBASIC SCH MG: 500 TABLET, SOLUBLE ORAL at 17:07

## 2018-10-03 RX ADMIN — HYDROCODONE BITARTRATE AND ACETAMINOPHEN PRN TAB: 5; 325 TABLET ORAL at 21:37

## 2018-10-03 RX ADMIN — INSULIN LISPRO SCH UNITS: 100 INJECTION, SOLUTION INTRAVENOUS; SUBCUTANEOUS at 08:00

## 2018-10-03 RX ADMIN — POTASSIUM PHOSPHATE, MONOBASIC SCH MG: 500 TABLET, SOLUBLE ORAL at 21:13

## 2018-10-03 RX ADMIN — METOPROLOL SUCCINATE SCH MG: 25 TABLET, EXTENDED RELEASE ORAL at 08:31

## 2018-10-03 RX ADMIN — POTASSIUM PHOSPHATE, MONOBASIC AND POTASSIUM PHOSPHATE, DIBASIC SCH MLS/HR: 224; 236 INJECTION, SOLUTION INTRAVENOUS at 00:23

## 2018-10-03 RX ADMIN — CYCLOBENZAPRINE HYDROCHLORIDE PRN MG: 10 TABLET, FILM COATED ORAL at 21:35

## 2018-10-03 RX ADMIN — HEPARIN SODIUM SCH UNIT: 5000 INJECTION, SOLUTION INTRAVENOUS; SUBCUTANEOUS at 06:32

## 2018-10-03 RX ADMIN — INSULIN LISPRO SCH UNITS: 100 INJECTION, SOLUTION INTRAVENOUS; SUBCUTANEOUS at 12:48

## 2018-10-03 RX ADMIN — FENOFIBRATE SCH MG: 134 CAPSULE ORAL at 08:28

## 2018-10-03 RX ADMIN — BENZONATATE SCH MG: 100 CAPSULE, LIQUID FILLED ORAL at 08:29

## 2018-10-03 RX ADMIN — HEPARIN SODIUM SCH UNIT: 5000 INJECTION, SOLUTION INTRAVENOUS; SUBCUTANEOUS at 21:41

## 2018-10-03 NOTE — PDOC
PULMONARY PROGRESS NOTES


Subjective


no soa


Vitals





Vital Signs








  Date Time  Temp Pulse Resp B/P (MAP) Pulse Ox O2 Delivery O2 Flow Rate FiO2


 


10/3/18 08:31  66  125/81    


 


10/3/18 07:34     96 Room Air  


 


10/3/18 07:00 97.8  20     





 97.8       








General:  Alert, No acute distress


Lungs:  Clear


Cardiovascular:  S1


Abdomen:  Soft


Neuro Exam:  Alert


Extremities:  No Edema


Skin:  Warm


Labs





Laboratory Tests








Test


 10/2/18


09:41 10/2/18


10:20 10/2/18


10:25 10/2/18


16:56


 


Glucose (Fingerstick)


 242 mg/dL


(70-99) 


 


 164 mg/dL


(70-99)


 


Urine Collection Type  Unknown   


 


Urine Color  Shazia   


 


Urine Clarity  Clear   


 


Urine pH  6.0   


 


Urine Specific Gravity  1.025   


 


Urine Protein


 


 100 mg/dL


(NEG-TRACE) 


 





 


Urine Glucose (UA)


 


 100 mg/dL


(NEG) 


 





 


Urine Ketones (Stick)


 


 Negative mg/dL


(NEG) 


 





 


Urine Blood  Negative (NEG)   


 


Urine Nitrite  Negative (NEG)   


 


Urine Bilirubin  Negative (NEG)   


 


Urine Urobilinogen Dipstick


 


 1.0 mg/dL (0.2


mg/dL) 


 





 


Urine Leukocyte Esterase  Negative (NEG)   


 


Urine RBC  0 /HPF (0-2)   


 


Urine WBC


 


 5-10 /HPF


(0-4) 


 





 


Urine Bacteria


 


 Mod /HPF


(0-FEW) 


 





 


White Blood Count


 


 


 17.1 x10^3/uL


(4.0-11.0) 





 


Red Blood Count


 


 


 4.74 x10^6/uL


(4.30-5.70) 





 


Hemoglobin


 


 


 15.9 g/dL


(13.0-17.5) 





 


Hematocrit


 


 


 45.0 %


(39.0-53.0) 





 


Mean Corpuscular Volume   95 fL ()  


 


Mean Corpuscular Hemoglobin   34 pg (25-35)  


 


Mean Corpuscular Hemoglobin


Concent 


 


 35 g/dL


(31-37) 





 


Red Cell Distribution Width


 


 


 13.6 %


(11.5-14.5) 





 


Platelet Count


 


 


 424 x10^3/uL


(140-400) 





 


Neutrophils (%) (Auto)   76 % (31-73)  


 


Lymphocytes (%) (Auto)   15 % (24-48)  


 


Monocytes (%) (Auto)   8 % (0-9)  


 


Eosinophils (%) (Auto)   1 % (0-3)  


 


Basophils (%) (Auto)   0 % (0-3)  


 


Neutrophils # (Auto)


 


 


 13.0 x10^3uL


(1.8-7.7) 





 


Lymphocytes # (Auto)


 


 


 2.6 x10^3/uL


(1.0-4.8) 





 


Monocytes # (Auto)


 


 


 1.3 x10^3/uL


(0.0-1.1) 





 


Eosinophils # (Auto)


 


 


 0.1 x10^3/uL


(0.0-0.7) 





 


Basophils # (Auto)


 


 


 0.1 x10^3/uL


(0.0-0.2) 





 


Segmented Neutrophils %   79 % (35-66)  


 


Lymphocytes %   15 % (24-48)  


 


Monocytes %   2 % (0-10)  


 


Eosinophils %   2 % (0-5)  


 


Basophils %   2 % (0-3)  


 


Platelet Estimate


 


 


 Increased


(ADEQUATE) 





 


Large Platelets   Few  


 


Giant Platelets   Occ  


 


Sodium Level


 


 


 139 mmol/L


(136-145) 





 


Potassium Level


 


 


 2.6 mmol/L


(3.5-5.1) 





 


Chloride Level


 


 


 94 mmol/L


() 





 


Carbon Dioxide Level


 


 


 27 mmol/L


(21-32) 





 


Anion Gap   18 (6-14)  


 


Blood Urea Nitrogen


 


 


 30 mg/dL


(8-26) 





 


Creatinine


 


 


 1.7 mg/dL


(0.7-1.3) 





 


Estimated GFR


(Cockcroft-Gault) 


 


 44.0 


 





 


BUN/Creatinine Ratio   18 (6-20)  


 


Glucose Level


 


 


 239 mg/dL


(70-99) 





 


Hemoglobin A1c


 


 


 7.3 %


(4.8-5.6) 





 


Calcium Level


 


 


 10.0 mg/dL


(8.5-10.1) 





 


Phosphorus Level


 


 


 1.9 mg/dL


(2.6-4.7) 





 


Magnesium Level


 


 


 0.3 mg/dL


(1.8-2.4) 





 


Total Bilirubin


 


 


 0.9 mg/dL


(0.2-1.0) 





 


Aspartate Amino Transf


(AST/SGOT) 


 


 34 U/L (15-37) 


 





 


Alanine Aminotransferase


(ALT/SGPT) 


 


 34 U/L (16-63) 


 





 


Alkaline Phosphatase


 


 


 65 U/L


() 





 


Creatine Kinase


 


 


 1745 U/L


() 





 


Troponin I Quantitative


 


 


 < 0.017 ng/mL


(0.000-0.055) 





 


Total Protein


 


 


 9.4 g/dL


(6.4-8.2) 





 


Albumin


 


 


 4.7 g/dL


(3.4-5.0) 





 


Albumin/Globulin Ratio   1.0 (1.0-1.7)  


 


Lipase


 


 


 428 U/L


() 





 


Test


 10/2/18


20:20 10/2/18


20:23 10/2/18


22:30 10/3/18


04:30


 


Sodium Level


 141 mmol/L


(136-145) 


 


 142 mmol/L


(136-145)


 


Potassium Level


 3.2 mmol/L


(3.5-5.1) 


 


 3.0 mmol/L


(3.5-5.1)


 


Chloride Level


 101 mmol/L


() 


 


 102 mmol/L


()


 


Carbon Dioxide Level


 29 mmol/L


(21-32) 


 


 29 mmol/L


(21-32)


 


Anion Gap 11 (6-14)    11 (6-14) 


 


Blood Urea Nitrogen


 25 mg/dL


(8-26) 


 


 21 mg/dL


(8-26)


 


Creatinine


 1.3 mg/dL


(0.7-1.3) 


 


 1.2 mg/dL


(0.7-1.3)


 


Estimated GFR


(Cockcroft-Gault) 60.0 


 


 


 65.8 





 


Glucose Level


 153 mg/dL


(70-99) 


 


 173 mg/dL


(70-99)


 


Calcium Level


 8.8 mg/dL


(8.5-10.1) 


 


 8.3 mg/dL


(8.5-10.1)


 


Magnesium Level


 1.0 mg/dL


(1.8-2.4) 


 


 2.0 mg/dL


(1.8-2.4)


 


Glucose (Fingerstick)


 


 145 mg/dL


(70-99) 


 





 


Urine Opiates Screen   Pos (NEG)  


 


Urine Methadone Screen   Neg (NEG)  


 


Urine Barbiturates   Neg (NEG)  


 


Urine Phencyclidine Screen   Neg (NEG)  


 


Urine


Amphetamine/Methamphetamine 


 


 Neg (NEG) 


 





 


Urine Benzodiazepines Screen   Neg (NEG)  


 


Urine Cocaine Screen   Neg (NEG)  


 


Urine Cannabinoids Screen   Pos (NEG)  


 


Urine Ethyl Alcohol   Neg (NEG)  


 


White Blood Count


 


 


 


 10.7 x10^3/uL


(4.0-11.0)


 


Red Blood Count


 


 


 


 4.25 x10^6/uL


(4.30-5.70)


 


Hemoglobin


 


 


 


 14.4 g/dL


(13.0-17.5)


 


Hematocrit


 


 


 


 40.5 %


(39.0-53.0)


 


Mean Corpuscular Volume    95 fL () 


 


Mean Corpuscular Hemoglobin    34 pg (25-35) 


 


Mean Corpuscular Hemoglobin


Concent 


 


 


 36 g/dL


(31-37)


 


Red Cell Distribution Width


 


 


 


 13.6 %


(11.5-14.5)


 


Platelet Count


 


 


 


 334 x10^3/uL


(140-400)


 


Neutrophils (%) (Auto)    62 % (31-73) 


 


Lymphocytes (%) (Auto)    29 % (24-48) 


 


Monocytes (%) (Auto)    7 % (0-9) 


 


Eosinophils (%) (Auto)    1 % (0-3) 


 


Basophils (%) (Auto)    1 % (0-3) 


 


Neutrophils # (Auto)


 


 


 


 6.6 x10^3uL


(1.8-7.7)


 


Lymphocytes # (Auto)


 


 


 


 3.1 x10^3/uL


(1.0-4.8)


 


Monocytes # (Auto)


 


 


 


 0.8 x10^3/uL


(0.0-1.1)


 


Eosinophils # (Auto)


 


 


 


 0.1 x10^3/uL


(0.0-0.7)


 


Basophils # (Auto)


 


 


 


 0.1 x10^3/uL


(0.0-0.2)


 


BUN/Creatinine Ratio    18 (6-20) 


 


Total Bilirubin


 


 


 


 0.8 mg/dL


(0.2-1.0)


 


Aspartate Amino Transf


(AST/SGOT) 


 


 


 32 U/L (15-37) 





 


Alanine Aminotransferase


(ALT/SGPT) 


 


 


 28 U/L (16-63) 





 


Alkaline Phosphatase


 


 


 


 61 U/L


()


 


Creatine Kinase


 


 


 


 1129 U/L


()


 


Total Protein


 


 


 


 7.4 g/dL


(6.4-8.2)


 


Albumin


 


 


 


 3.6 g/dL


(3.4-5.0)


 


Albumin/Globulin Ratio    0.9 (1.0-1.7) 


 


Lipase


 


 


 


 385 U/L


()


 


Test


 10/3/18


07:21 


 


 





 


Glucose (Fingerstick)


 144 mg/dL


(70-99) 


 


 











Laboratory Tests








Test


 10/2/18


09:41 10/2/18


10:20 10/2/18


10:25 10/2/18


16:56


 


Glucose (Fingerstick)


 242 mg/dL


(70-99) 


 


 164 mg/dL


(70-99)


 


Urine Collection Type  Unknown   


 


Urine Color  Shazia   


 


Urine Clarity  Clear   


 


Urine pH  6.0   


 


Urine Specific Gravity  1.025   


 


Urine Protein


 


 100 mg/dL


(NEG-TRACE) 


 





 


Urine Glucose (UA)


 


 100 mg/dL


(NEG) 


 





 


Urine Ketones (Stick)


 


 Negative mg/dL


(NEG) 


 





 


Urine Blood  Negative (NEG)   


 


Urine Nitrite  Negative (NEG)   


 


Urine Bilirubin  Negative (NEG)   


 


Urine Urobilinogen Dipstick


 


 1.0 mg/dL (0.2


mg/dL) 


 





 


Urine Leukocyte Esterase  Negative (NEG)   


 


Urine RBC  0 /HPF (0-2)   


 


Urine WBC


 


 5-10 /HPF


(0-4) 


 





 


Urine Bacteria


 


 Mod /HPF


(0-FEW) 


 





 


White Blood Count


 


 


 17.1 x10^3/uL


(4.0-11.0) 





 


Red Blood Count


 


 


 4.74 x10^6/uL


(4.30-5.70) 





 


Hemoglobin


 


 


 15.9 g/dL


(13.0-17.5) 





 


Hematocrit


 


 


 45.0 %


(39.0-53.0) 





 


Mean Corpuscular Volume   95 fL ()  


 


Mean Corpuscular Hemoglobin   34 pg (25-35)  


 


Mean Corpuscular Hemoglobin


Concent 


 


 35 g/dL


(31-37) 





 


Red Cell Distribution Width


 


 


 13.6 %


(11.5-14.5) 





 


Platelet Count


 


 


 424 x10^3/uL


(140-400) 





 


Neutrophils (%) (Auto)   76 % (31-73)  


 


Lymphocytes (%) (Auto)   15 % (24-48)  


 


Monocytes (%) (Auto)   8 % (0-9)  


 


Eosinophils (%) (Auto)   1 % (0-3)  


 


Basophils (%) (Auto)   0 % (0-3)  


 


Neutrophils # (Auto)


 


 


 13.0 x10^3uL


(1.8-7.7) 





 


Lymphocytes # (Auto)


 


 


 2.6 x10^3/uL


(1.0-4.8) 





 


Monocytes # (Auto)


 


 


 1.3 x10^3/uL


(0.0-1.1) 





 


Eosinophils # (Auto)


 


 


 0.1 x10^3/uL


(0.0-0.7) 





 


Basophils # (Auto)


 


 


 0.1 x10^3/uL


(0.0-0.2) 





 


Segmented Neutrophils %   79 % (35-66)  


 


Lymphocytes %   15 % (24-48)  


 


Monocytes %   2 % (0-10)  


 


Eosinophils %   2 % (0-5)  


 


Basophils %   2 % (0-3)  


 


Platelet Estimate


 


 


 Increased


(ADEQUATE) 





 


Large Platelets   Few  


 


Giant Platelets   Occ  


 


Sodium Level


 


 


 139 mmol/L


(136-145) 





 


Potassium Level


 


 


 2.6 mmol/L


(3.5-5.1) 





 


Chloride Level


 


 


 94 mmol/L


() 





 


Carbon Dioxide Level


 


 


 27 mmol/L


(21-32) 





 


Anion Gap   18 (6-14)  


 


Blood Urea Nitrogen


 


 


 30 mg/dL


(8-26) 





 


Creatinine


 


 


 1.7 mg/dL


(0.7-1.3) 





 


Estimated GFR


(Cockcroft-Gault) 


 


 44.0 


 





 


BUN/Creatinine Ratio   18 (6-20)  


 


Glucose Level


 


 


 239 mg/dL


(70-99) 





 


Hemoglobin A1c


 


 


 7.3 %


(4.8-5.6) 





 


Calcium Level


 


 


 10.0 mg/dL


(8.5-10.1) 





 


Phosphorus Level


 


 


 1.9 mg/dL


(2.6-4.7) 





 


Magnesium Level


 


 


 0.3 mg/dL


(1.8-2.4) 





 


Total Bilirubin


 


 


 0.9 mg/dL


(0.2-1.0) 





 


Aspartate Amino Transf


(AST/SGOT) 


 


 34 U/L (15-37) 


 





 


Alanine Aminotransferase


(ALT/SGPT) 


 


 34 U/L (16-63) 


 





 


Alkaline Phosphatase


 


 


 65 U/L


() 





 


Creatine Kinase


 


 


 1745 U/L


() 





 


Troponin I Quantitative


 


 


 < 0.017 ng/mL


(0.000-0.055) 





 


Total Protein


 


 


 9.4 g/dL


(6.4-8.2) 





 


Albumin


 


 


 4.7 g/dL


(3.4-5.0) 





 


Albumin/Globulin Ratio   1.0 (1.0-1.7)  


 


Lipase


 


 


 428 U/L


() 





 


Test


 10/2/18


20:20 10/2/18


20:23 10/2/18


22:30 10/3/18


04:30


 


Sodium Level


 141 mmol/L


(136-145) 


 


 142 mmol/L


(136-145)


 


Potassium Level


 3.2 mmol/L


(3.5-5.1) 


 


 3.0 mmol/L


(3.5-5.1)


 


Chloride Level


 101 mmol/L


() 


 


 102 mmol/L


()


 


Carbon Dioxide Level


 29 mmol/L


(21-32) 


 


 29 mmol/L


(21-32)


 


Anion Gap 11 (6-14)    11 (6-14) 


 


Blood Urea Nitrogen


 25 mg/dL


(8-26) 


 


 21 mg/dL


(8-26)


 


Creatinine


 1.3 mg/dL


(0.7-1.3) 


 


 1.2 mg/dL


(0.7-1.3)


 


Estimated GFR


(Cockcroft-Gault) 60.0 


 


 


 65.8 





 


Glucose Level


 153 mg/dL


(70-99) 


 


 173 mg/dL


(70-99)


 


Calcium Level


 8.8 mg/dL


(8.5-10.1) 


 


 8.3 mg/dL


(8.5-10.1)


 


Magnesium Level


 1.0 mg/dL


(1.8-2.4) 


 


 2.0 mg/dL


(1.8-2.4)


 


Glucose (Fingerstick)


 


 145 mg/dL


(70-99) 


 





 


Urine Opiates Screen   Pos (NEG)  


 


Urine Methadone Screen   Neg (NEG)  


 


Urine Barbiturates   Neg (NEG)  


 


Urine Phencyclidine Screen   Neg (NEG)  


 


Urine


Amphetamine/Methamphetamine 


 


 Neg (NEG) 


 





 


Urine Benzodiazepines Screen   Neg (NEG)  


 


Urine Cocaine Screen   Neg (NEG)  


 


Urine Cannabinoids Screen   Pos (NEG)  


 


Urine Ethyl Alcohol   Neg (NEG)  


 


White Blood Count


 


 


 


 10.7 x10^3/uL


(4.0-11.0)


 


Red Blood Count


 


 


 


 4.25 x10^6/uL


(4.30-5.70)


 


Hemoglobin


 


 


 


 14.4 g/dL


(13.0-17.5)


 


Hematocrit


 


 


 


 40.5 %


(39.0-53.0)


 


Mean Corpuscular Volume    95 fL () 


 


Mean Corpuscular Hemoglobin    34 pg (25-35) 


 


Mean Corpuscular Hemoglobin


Concent 


 


 


 36 g/dL


(31-37)


 


Red Cell Distribution Width


 


 


 


 13.6 %


(11.5-14.5)


 


Platelet Count


 


 


 


 334 x10^3/uL


(140-400)


 


Neutrophils (%) (Auto)    62 % (31-73) 


 


Lymphocytes (%) (Auto)    29 % (24-48) 


 


Monocytes (%) (Auto)    7 % (0-9) 


 


Eosinophils (%) (Auto)    1 % (0-3) 


 


Basophils (%) (Auto)    1 % (0-3) 


 


Neutrophils # (Auto)


 


 


 


 6.6 x10^3uL


(1.8-7.7)


 


Lymphocytes # (Auto)


 


 


 


 3.1 x10^3/uL


(1.0-4.8)


 


Monocytes # (Auto)


 


 


 


 0.8 x10^3/uL


(0.0-1.1)


 


Eosinophils # (Auto)


 


 


 


 0.1 x10^3/uL


(0.0-0.7)


 


Basophils # (Auto)


 


 


 


 0.1 x10^3/uL


(0.0-0.2)


 


BUN/Creatinine Ratio    18 (6-20) 


 


Total Bilirubin


 


 


 


 0.8 mg/dL


(0.2-1.0)


 


Aspartate Amino Transf


(AST/SGOT) 


 


 


 32 U/L (15-37) 





 


Alanine Aminotransferase


(ALT/SGPT) 


 


 


 28 U/L (16-63) 





 


Alkaline Phosphatase


 


 


 


 61 U/L


()


 


Creatine Kinase


 


 


 


 1129 U/L


()


 


Total Protein


 


 


 


 7.4 g/dL


(6.4-8.2)


 


Albumin


 


 


 


 3.6 g/dL


(3.4-5.0)


 


Albumin/Globulin Ratio    0.9 (1.0-1.7) 


 


Lipase


 


 


 


 385 U/L


()


 


Test


 10/3/18


07:21 


 


 





 


Glucose (Fingerstick)


 144 mg/dL


(70-99) 


 


 











Medications





Active Scripts








 Medications  Dose


 Route/Sig


 Max Daily Dose Days Date Category


 


 Atorvastatin


 Calcium 20 Mg


 Tablet  1 Tab


 PO HS


   10/2/18 Reported


 


 Losartan


 Potassium 100 Mg


 Tablet  100 Mg


 PO HS


   10/2/18 Reported


 


 Paroxetine Hcl 20


 Mg Tablet  1 Tab


 PO HS


   10/2/18 Reported


 


 Fenofibrate


  (Fenofibrate,Micronized)


 134 Mg Capsule  1 Cap


 PO DAILY


   10/2/18 Reported


 


 Benzonatate 100


 Mg Capsule  1 Cap


 PO BID


   10/2/18 Reported


 


 Metoprolol


 Succinate ( Xl )


  (Metoprolol


 Succinate) 25 Mg


 Tab.er.24h  2 Tab


 PO BID


   10/2/18 Reported


 


 Omeprazole 20 Mg


 Capsule.dr  1 Cap


 PO DAILY


   10/2/18 Reported











Impression


.


1.  Right lower lobe irregular nodules seen on the CT abdomen and pelvis. 


Largest of them 14 mm in size.  This is a patient who is at low risk for lung


malignancy, smoked only for 10 years and no other evidence of any malignancy


outside of lungs.  These nodules need to be followed up.  I will obtain a full


CT chest to have a better view of the entire chest and to look for other


nodules.


2.  Diabetes, under poor control.


3.  Hypokalemia.


4.  Acute kidney injury secondary to dehydration.





Plan


.





1.  CT chest reviewed. no report available yet. I do not see any additional 

nodules/ LLL scarring


2.  followup CT chest in 4 months would be reasonable. Appointment given/ 

likely inflammatory nodules


3.  Management of diabetes and electrolyte imbalance per PCP.











ENID DUKE MD Oct 3, 2018 09:25

## 2018-10-03 NOTE — PDOC
G I PROGRESS NOTE


Subjective


Still with some abdominal cramping.  Has eaten last evening and this morning w/

o emesis.  Bothered most by "blurry vision", though not as bad today.


Objective


From our EMR, seen by our practice in 2004 for heartburn; EGD recommended, but 

not done.  Seen again in 2007 for probable infectious diarrhea; possible 

colonoscopy mentioned, but not done.


Physical Exam


Lungs clear.


RRR


Abdomen soft, not tender nor distended.


Review of Relevant


I have reviewed the following items abhijit (where applicable) has been applied.


Labs





Laboratory Tests








Test


 10/2/18


09:41 10/2/18


10:20 10/2/18


10:25 10/2/18


16:56


 


Glucose (Fingerstick)


 242 mg/dL


(70-99) 


 


 164 mg/dL


(70-99)


 


Urine Collection Type  Unknown   


 


Urine Color  Shazia   


 


Urine Clarity  Clear   


 


Urine pH  6.0   


 


Urine Specific Gravity  1.025   


 


Urine Protein


 


 100 mg/dL


(NEG-TRACE) 


 





 


Urine Glucose (UA)


 


 100 mg/dL


(NEG) 


 





 


Urine Ketones (Stick)


 


 Negative mg/dL


(NEG) 


 





 


Urine Blood  Negative (NEG)   


 


Urine Nitrite  Negative (NEG)   


 


Urine Bilirubin  Negative (NEG)   


 


Urine Urobilinogen Dipstick


 


 1.0 mg/dL (0.2


mg/dL) 


 





 


Urine Leukocyte Esterase  Negative (NEG)   


 


Urine RBC  0 /HPF (0-2)   


 


Urine WBC


 


 5-10 /HPF


(0-4) 


 





 


Urine Bacteria


 


 Mod /HPF


(0-FEW) 


 





 


White Blood Count


 


 


 17.1 x10^3/uL


(4.0-11.0) 





 


Red Blood Count


 


 


 4.74 x10^6/uL


(4.30-5.70) 





 


Hemoglobin


 


 


 15.9 g/dL


(13.0-17.5) 





 


Hematocrit


 


 


 45.0 %


(39.0-53.0) 





 


Mean Corpuscular Volume   95 fL ()  


 


Mean Corpuscular Hemoglobin   34 pg (25-35)  


 


Mean Corpuscular Hemoglobin


Concent 


 


 35 g/dL


(31-37) 





 


Red Cell Distribution Width


 


 


 13.6 %


(11.5-14.5) 





 


Platelet Count


 


 


 424 x10^3/uL


(140-400) 





 


Neutrophils (%) (Auto)   76 % (31-73)  


 


Lymphocytes (%) (Auto)   15 % (24-48)  


 


Monocytes (%) (Auto)   8 % (0-9)  


 


Eosinophils (%) (Auto)   1 % (0-3)  


 


Basophils (%) (Auto)   0 % (0-3)  


 


Neutrophils # (Auto)


 


 


 13.0 x10^3uL


(1.8-7.7) 





 


Lymphocytes # (Auto)


 


 


 2.6 x10^3/uL


(1.0-4.8) 





 


Monocytes # (Auto)


 


 


 1.3 x10^3/uL


(0.0-1.1) 





 


Eosinophils # (Auto)


 


 


 0.1 x10^3/uL


(0.0-0.7) 





 


Basophils # (Auto)


 


 


 0.1 x10^3/uL


(0.0-0.2) 





 


Segmented Neutrophils %   79 % (35-66)  


 


Lymphocytes %   15 % (24-48)  


 


Monocytes %   2 % (0-10)  


 


Eosinophils %   2 % (0-5)  


 


Basophils %   2 % (0-3)  


 


Platelet Estimate


 


 


 Increased


(ADEQUATE) 





 


Large Platelets   Few  


 


Giant Platelets   Occ  


 


Sodium Level


 


 


 139 mmol/L


(136-145) 





 


Potassium Level


 


 


 2.6 mmol/L


(3.5-5.1) 





 


Chloride Level


 


 


 94 mmol/L


() 





 


Carbon Dioxide Level


 


 


 27 mmol/L


(21-32) 





 


Anion Gap   18 (6-14)  


 


Blood Urea Nitrogen


 


 


 30 mg/dL


(8-26) 





 


Creatinine


 


 


 1.7 mg/dL


(0.7-1.3) 





 


Estimated GFR


(Cockcroft-Gault) 


 


 44.0 


 





 


BUN/Creatinine Ratio   18 (6-20)  


 


Glucose Level


 


 


 239 mg/dL


(70-99) 





 


Hemoglobin A1c


 


 


 7.3 %


(4.8-5.6) 





 


Calcium Level


 


 


 10.0 mg/dL


(8.5-10.1) 





 


Phosphorus Level


 


 


 1.9 mg/dL


(2.6-4.7) 





 


Magnesium Level


 


 


 0.3 mg/dL


(1.8-2.4) 





 


Total Bilirubin


 


 


 0.9 mg/dL


(0.2-1.0) 





 


Aspartate Amino Transf


(AST/SGOT) 


 


 34 U/L (15-37) 


 





 


Alanine Aminotransferase


(ALT/SGPT) 


 


 34 U/L (16-63) 


 





 


Alkaline Phosphatase


 


 


 65 U/L


() 





 


Creatine Kinase


 


 


 1745 U/L


() 





 


Troponin I Quantitative


 


 


 < 0.017 ng/mL


(0.000-0.055) 





 


Total Protein


 


 


 9.4 g/dL


(6.4-8.2) 





 


Albumin


 


 


 4.7 g/dL


(3.4-5.0) 





 


Albumin/Globulin Ratio   1.0 (1.0-1.7)  


 


Lipase


 


 


 428 U/L


() 





 


Test


 10/2/18


20:20 10/2/18


20:23 10/2/18


22:30 10/3/18


04:30


 


Sodium Level


 141 mmol/L


(136-145) 


 


 142 mmol/L


(136-145)


 


Potassium Level


 3.2 mmol/L


(3.5-5.1) 


 


 3.0 mmol/L


(3.5-5.1)


 


Chloride Level


 101 mmol/L


() 


 


 102 mmol/L


()


 


Carbon Dioxide Level


 29 mmol/L


(21-32) 


 


 29 mmol/L


(21-32)


 


Anion Gap 11 (6-14)    11 (6-14) 


 


Blood Urea Nitrogen


 25 mg/dL


(8-26) 


 


 21 mg/dL


(8-26)


 


Creatinine


 1.3 mg/dL


(0.7-1.3) 


 


 1.2 mg/dL


(0.7-1.3)


 


Estimated GFR


(Cockcroft-Gault) 60.0 


 


 


 65.8 





 


Glucose Level


 153 mg/dL


(70-99) 


 


 173 mg/dL


(70-99)


 


Calcium Level


 8.8 mg/dL


(8.5-10.1) 


 


 8.3 mg/dL


(8.5-10.1)


 


Magnesium Level


 1.0 mg/dL


(1.8-2.4) 


 


 2.0 mg/dL


(1.8-2.4)


 


Glucose (Fingerstick)


 


 145 mg/dL


(70-99) 


 





 


Urine Opiates Screen   Pos (NEG)  


 


Urine Methadone Screen   Neg (NEG)  


 


Urine Barbiturates   Neg (NEG)  


 


Urine Phencyclidine Screen   Neg (NEG)  


 


Urine


Amphetamine/Methamphetamine 


 


 Neg (NEG) 


 





 


Urine Benzodiazepines Screen   Neg (NEG)  


 


Urine Cocaine Screen   Neg (NEG)  


 


Urine Cannabinoids Screen   Pos (NEG)  


 


Urine Ethyl Alcohol   Neg (NEG)  


 


White Blood Count


 


 


 


 10.7 x10^3/uL


(4.0-11.0)


 


Red Blood Count


 


 


 


 4.25 x10^6/uL


(4.30-5.70)


 


Hemoglobin


 


 


 


 14.4 g/dL


(13.0-17.5)


 


Hematocrit


 


 


 


 40.5 %


(39.0-53.0)


 


Mean Corpuscular Volume    95 fL () 


 


Mean Corpuscular Hemoglobin    34 pg (25-35) 


 


Mean Corpuscular Hemoglobin


Concent 


 


 


 36 g/dL


(31-37)


 


Red Cell Distribution Width


 


 


 


 13.6 %


(11.5-14.5)


 


Platelet Count


 


 


 


 334 x10^3/uL


(140-400)


 


Neutrophils (%) (Auto)    62 % (31-73) 


 


Lymphocytes (%) (Auto)    29 % (24-48) 


 


Monocytes (%) (Auto)    7 % (0-9) 


 


Eosinophils (%) (Auto)    1 % (0-3) 


 


Basophils (%) (Auto)    1 % (0-3) 


 


Neutrophils # (Auto)


 


 


 


 6.6 x10^3uL


(1.8-7.7)


 


Lymphocytes # (Auto)


 


 


 


 3.1 x10^3/uL


(1.0-4.8)


 


Monocytes # (Auto)


 


 


 


 0.8 x10^3/uL


(0.0-1.1)


 


Eosinophils # (Auto)


 


 


 


 0.1 x10^3/uL


(0.0-0.7)


 


Basophils # (Auto)


 


 


 


 0.1 x10^3/uL


(0.0-0.2)


 


BUN/Creatinine Ratio    18 (6-20) 


 


Total Bilirubin


 


 


 


 0.8 mg/dL


(0.2-1.0)


 


Aspartate Amino Transf


(AST/SGOT) 


 


 


 32 U/L (15-37) 





 


Alanine Aminotransferase


(ALT/SGPT) 


 


 


 28 U/L (16-63) 





 


Alkaline Phosphatase


 


 


 


 61 U/L


()


 


Creatine Kinase


 


 


 


 1129 U/L


()


 


Total Protein


 


 


 


 7.4 g/dL


(6.4-8.2)


 


Albumin


 


 


 


 3.6 g/dL


(3.4-5.0)


 


Albumin/Globulin Ratio    0.9 (1.0-1.7) 


 


Lipase


 


 


 


 385 U/L


()


 


Test


 10/3/18


07:21 


 


 





 


Glucose (Fingerstick)


 144 mg/dL


(70-99) 


 


 











Laboratory Tests








Test


 10/2/18


10:20 10/2/18


10:25 10/2/18


16:56 10/2/18


20:20


 


Urine Collection Type Unknown    


 


Urine Color Shazia    


 


Urine Clarity Clear    


 


Urine pH 6.0    


 


Urine Specific Gravity 1.025    


 


Urine Protein


 100 mg/dL


(NEG-TRACE) 


 


 





 


Urine Glucose (UA)


 100 mg/dL


(NEG) 


 


 





 


Urine Ketones (Stick)


 Negative mg/dL


(NEG) 


 


 





 


Urine Blood Negative (NEG)    


 


Urine Nitrite Negative (NEG)    


 


Urine Bilirubin Negative (NEG)    


 


Urine Urobilinogen Dipstick


 1.0 mg/dL (0.2


mg/dL) 


 


 





 


Urine Leukocyte Esterase Negative (NEG)    


 


Urine RBC 0 /HPF (0-2)    


 


Urine WBC


 5-10 /HPF


(0-4) 


 


 





 


Urine Bacteria


 Mod /HPF


(0-FEW) 


 


 





 


White Blood Count


 


 17.1 x10^3/uL


(4.0-11.0) 


 





 


Red Blood Count


 


 4.74 x10^6/uL


(4.30-5.70) 


 





 


Hemoglobin


 


 15.9 g/dL


(13.0-17.5) 


 





 


Hematocrit


 


 45.0 %


(39.0-53.0) 


 





 


Mean Corpuscular Volume  95 fL ()   


 


Mean Corpuscular Hemoglobin  34 pg (25-35)   


 


Mean Corpuscular Hemoglobin


Concent 


 35 g/dL


(31-37) 


 





 


Red Cell Distribution Width


 


 13.6 %


(11.5-14.5) 


 





 


Platelet Count


 


 424 x10^3/uL


(140-400) 


 





 


Neutrophils (%) (Auto)  76 % (31-73)   


 


Lymphocytes (%) (Auto)  15 % (24-48)   


 


Monocytes (%) (Auto)  8 % (0-9)   


 


Eosinophils (%) (Auto)  1 % (0-3)   


 


Basophils (%) (Auto)  0 % (0-3)   


 


Neutrophils # (Auto)


 


 13.0 x10^3uL


(1.8-7.7) 


 





 


Lymphocytes # (Auto)


 


 2.6 x10^3/uL


(1.0-4.8) 


 





 


Monocytes # (Auto)


 


 1.3 x10^3/uL


(0.0-1.1) 


 





 


Eosinophils # (Auto)


 


 0.1 x10^3/uL


(0.0-0.7) 


 





 


Basophils # (Auto)


 


 0.1 x10^3/uL


(0.0-0.2) 


 





 


Segmented Neutrophils %  79 % (35-66)   


 


Lymphocytes %  15 % (24-48)   


 


Monocytes %  2 % (0-10)   


 


Eosinophils %  2 % (0-5)   


 


Basophils %  2 % (0-3)   


 


Platelet Estimate


 


 Increased


(ADEQUATE) 


 





 


Large Platelets  Few   


 


Giant Platelets  Occ   


 


Sodium Level


 


 139 mmol/L


(136-145) 


 141 mmol/L


(136-145)


 


Potassium Level


 


 2.6 mmol/L


(3.5-5.1) 


 3.2 mmol/L


(3.5-5.1)


 


Chloride Level


 


 94 mmol/L


() 


 101 mmol/L


()


 


Carbon Dioxide Level


 


 27 mmol/L


(21-32) 


 29 mmol/L


(21-32)


 


Anion Gap  18 (6-14)   11 (6-14) 


 


Blood Urea Nitrogen


 


 30 mg/dL


(8-26) 


 25 mg/dL


(8-26)


 


Creatinine


 


 1.7 mg/dL


(0.7-1.3) 


 1.3 mg/dL


(0.7-1.3)


 


Estimated GFR


(Cockcroft-Gault) 


 44.0 


 


 60.0 





 


BUN/Creatinine Ratio  18 (6-20)   


 


Glucose Level


 


 239 mg/dL


(70-99) 


 153 mg/dL


(70-99)


 


Hemoglobin A1c


 


 7.3 %


(4.8-5.6) 


 





 


Calcium Level


 


 10.0 mg/dL


(8.5-10.1) 


 8.8 mg/dL


(8.5-10.1)


 


Phosphorus Level


 


 1.9 mg/dL


(2.6-4.7) 


 





 


Magnesium Level


 


 0.3 mg/dL


(1.8-2.4) 


 1.0 mg/dL


(1.8-2.4)


 


Total Bilirubin


 


 0.9 mg/dL


(0.2-1.0) 


 





 


Aspartate Amino Transf


(AST/SGOT) 


 34 U/L (15-37) 


 


 





 


Alanine Aminotransferase


(ALT/SGPT) 


 34 U/L (16-63) 


 


 





 


Alkaline Phosphatase


 


 65 U/L


() 


 





 


Creatine Kinase


 


 1745 U/L


() 


 





 


Troponin I Quantitative


 


 < 0.017 ng/mL


(0.000-0.055) 


 





 


Total Protein


 


 9.4 g/dL


(6.4-8.2) 


 





 


Albumin


 


 4.7 g/dL


(3.4-5.0) 


 





 


Albumin/Globulin Ratio  1.0 (1.0-1.7)   


 


Lipase


 


 428 U/L


() 


 





 


Glucose (Fingerstick)


 


 


 164 mg/dL


(70-99) 





 


Test


 10/2/18


20:23 10/2/18


22:30 10/3/18


04:30 10/3/18


07:21


 


Glucose (Fingerstick)


 145 mg/dL


(70-99) 


 


 144 mg/dL


(70-99)


 


Urine Opiates Screen  Pos (NEG)   


 


Urine Methadone Screen  Neg (NEG)   


 


Urine Barbiturates  Neg (NEG)   


 


Urine Phencyclidine Screen  Neg (NEG)   


 


Urine


Amphetamine/Methamphetamine 


 Neg (NEG) 


 


 





 


Urine Benzodiazepines Screen  Neg (NEG)   


 


Urine Cocaine Screen  Neg (NEG)   


 


Urine Cannabinoids Screen  Pos (NEG)   


 


Urine Ethyl Alcohol  Neg (NEG)   


 


White Blood Count


 


 


 10.7 x10^3/uL


(4.0-11.0) 





 


Red Blood Count


 


 


 4.25 x10^6/uL


(4.30-5.70) 





 


Hemoglobin


 


 


 14.4 g/dL


(13.0-17.5) 





 


Hematocrit


 


 


 40.5 %


(39.0-53.0) 





 


Mean Corpuscular Volume   95 fL ()  


 


Mean Corpuscular Hemoglobin   34 pg (25-35)  


 


Mean Corpuscular Hemoglobin


Concent 


 


 36 g/dL


(31-37) 





 


Red Cell Distribution Width


 


 


 13.6 %


(11.5-14.5) 





 


Platelet Count


 


 


 334 x10^3/uL


(140-400) 





 


Neutrophils (%) (Auto)   62 % (31-73)  


 


Lymphocytes (%) (Auto)   29 % (24-48)  


 


Monocytes (%) (Auto)   7 % (0-9)  


 


Eosinophils (%) (Auto)   1 % (0-3)  


 


Basophils (%) (Auto)   1 % (0-3)  


 


Neutrophils # (Auto)


 


 


 6.6 x10^3uL


(1.8-7.7) 





 


Lymphocytes # (Auto)


 


 


 3.1 x10^3/uL


(1.0-4.8) 





 


Monocytes # (Auto)


 


 


 0.8 x10^3/uL


(0.0-1.1) 





 


Eosinophils # (Auto)


 


 


 0.1 x10^3/uL


(0.0-0.7) 





 


Basophils # (Auto)


 


 


 0.1 x10^3/uL


(0.0-0.2) 





 


Sodium Level


 


 


 142 mmol/L


(136-145) 





 


Potassium Level


 


 


 3.0 mmol/L


(3.5-5.1) 





 


Chloride Level


 


 


 102 mmol/L


() 





 


Carbon Dioxide Level


 


 


 29 mmol/L


(21-32) 





 


Anion Gap   11 (6-14)  


 


Blood Urea Nitrogen


 


 


 21 mg/dL


(8-26) 





 


Creatinine


 


 


 1.2 mg/dL


(0.7-1.3) 





 


Estimated GFR


(Cockcroft-Gault) 


 


 65.8 


 





 


BUN/Creatinine Ratio   18 (6-20)  


 


Glucose Level


 


 


 173 mg/dL


(70-99) 





 


Calcium Level


 


 


 8.3 mg/dL


(8.5-10.1) 





 


Magnesium Level


 


 


 2.0 mg/dL


(1.8-2.4) 





 


Total Bilirubin


 


 


 0.8 mg/dL


(0.2-1.0) 





 


Aspartate Amino Transf


(AST/SGOT) 


 


 32 U/L (15-37) 


 





 


Alanine Aminotransferase


(ALT/SGPT) 


 


 28 U/L (16-63) 


 





 


Alkaline Phosphatase


 


 


 61 U/L


() 





 


Creatine Kinase


 


 


 1129 U/L


() 





 


Total Protein


 


 


 7.4 g/dL


(6.4-8.2) 





 


Albumin


 


 


 3.6 g/dL


(3.4-5.0) 





 


Albumin/Globulin Ratio   0.9 (1.0-1.7)  


 


Lipase


 


 


 385 U/L


() 














Electrolyte abnormalities improving.


Vitals/I & O





Vital Sign - Last 24 Hours








 10/2/18 10/2/18 10/2/18 10/2/18





 10:02 10:32 11:02 11:34


 


Pulse 70 72 64 77


 


Resp 16 16 16 16


 


Pulse Ox 100 99 95 96





 10/2/18 10/2/18 10/2/18 10/2/18





 12:34 13:34 14:15 15:16


 


Temp   99.3 





   99.3 


 


Pulse 73 80 76 


 


Resp 16 16 18 


 


B/P (MAP)   126/64 (84) 


 


Pulse Ox 100 100 98 


 


O2 Delivery   Room Air Room Air


 


    





    





 10/2/18 10/2/18 10/2/18 10/2/18





 15:55 19:00 19:43 20:29


 


Temp  98.4  





  98.4  


 


Pulse  79  79


 


Resp  18  


 


B/P (MAP)  141/85 (103)  141/85


 


Pulse Ox  94  


 


O2 Delivery Room Air Room Air Room Air 


 


    





    





 10/2/18 10/2/18 10/2/18 10/2/18





 20:30 22:28 23:00 23:28


 


Temp   98.5 





   98.5 


 


Pulse 79  81 


 


Resp   18 


 


B/P (MAP) 141/85  152/106 (121) 


 


Pulse Ox  94 96 96


 


O2 Delivery  Room Air Room Air Room Air


 


    





    





 10/3/18 10/3/18 10/3/18 10/3/18





 03:00 06:34 07:00 07:34


 


Temp 97.9  97.8 





 97.9  97.8 


 


Pulse 67  66 


 


Resp 18  20 


 


B/P (MAP) 137/84 (101)  125/81 (96) 


 


Pulse Ox 97 97 96 96


 


O2 Delivery Room Air Room Air Room Air Room Air


 


    





    





 10/3/18   





 08:31   


 


Pulse 66   


 


B/P (MAP) 125/81   














Intake and Output   


 


 10/2/18 10/2/18 10/3/18





 15:00 23:00 07:00


 


Intake Total 1000 ml 610 ml 480 ml


 


Output Total  250 ml 1050 ml


 


Balance 1000 ml 360 ml -570 ml








Problem List


Problems


Medical Problems:


(1) TYLER (acute kidney injury)


Status: Acute  





(2) Hypokalemia


Status: Acute  





(3) Hypomagnesemia


Status: Acute  





(4) Rhabdomyolysis


Status: Acute  





(5) Vomiting


Status: Acute  





Assessment


Likely viral syndrome.


Other issues likely secondary to this, volume depletion, etc.


GERD, historically long-standing.


Plan of Care:  Continue current Tx, Mgmt


Plan of Care Note


OK to advance diet.


Consider home if tolerates diet.


Continue PPI; should consider EGD some time.


CRC screening when due.











DEDE BOBO MD Oct 3, 2018 10:07

## 2018-10-03 NOTE — RAD
CT of the chest without contrast 10/3/2018

 

INDICATION: Pulmonary nodules seen on recent abdominal CT scan.

 

COMPARISON STUDY: Abdominal CT scan, yesterday

 

TECHNIQUE: Multidetector CT imaging of the chest was performed without the

history of IV contrast.

 

FINDINGS: 

 

Mild gynecomastia seen bilaterally.

 

Heart size is normal. No pericardial effusion is seen. Mild coronary 

calcifications noted. No pathologically enlarged mediastinal adenopathy is

identified. Possible delayed nephrogram noted on the right. Correlate with

renal function. No other acute changes in the upper abdomen are 

appreciated.

 

No pneumothorax or pleural effusion is identified. Mild areas of discoid 

atelectasis are seen in the right lung base.  

 

There is a solid nodule in the basilar right lower lobe measuring up to 

1.4 cm in diameter ( thin axial image 243). Inferior to this there is a 

second 1.1 cm noncalcified nodule (axial image 251). Inferior to this 

there is a 0.7 cm solid nodule (axial image 257). All 3 nodules have a 

relatively sessile appearance, best appreciated on sagittal imaging. Mild 

central bronchiectasis is noted. No pulmonary nodules are identified on 

the left.

 

IMPRESSION:

 

 

1. 3 right basilar pulmonary nodules measuring 1.4, 1.1, and 0.7 cm 

respectively. Differential considerations included infectious or 

inflammatory process, versus metastatic disease. PET/CT could be attempted

for further characterization, though given sessile appearance sensitivity 

may be more limited than would be predicted by maximum nodule diameter 

alone. For similar reasons percutaneous biopsy could be attempted but 

would also be challenging.

 

 

 2. Possible delayed nephrogram on the right. Correlate with renal 

function

 

 

 

CT DOSING PQRS STATEMENT: 

One or more of the following individualized dose reduction techniques were

utilized for this examination: 

 1. Automated exposure control 

 2. Adjustment of the mA and/or kV according to patient size  

3. Use of iterative reconstruction technique

 

 

 

Electronically signed by: Gold Ivy MD (10/3/2018 10:39 AM) [id number]

## 2018-10-03 NOTE — PDOC
PROGRESS NOTES


Chief Complaint


Chief Complaint


Nausea, vomiting


Hyperglycemia


Abdominal Pain





History of Present Illness


History of Present Illness


Admitted yesterday with N/V, hyperglycemia, abdominal pain and inability to 

hold down food or meds. Found with mild rhabdo and hypokalemia, hypomagnesemia 

as well as lung nodules on imaging. He is feeling somewhat improved today, but 

he now feels dizzy, unsteady on his feet and still cannot hold down food.





A/P:


abd pain, N/V, gasteroenteritis - possibly gastroparesis - will start on 

bethanachol 10mg before meals. GI Consult


TLYER, vasomotor, dehydration - likely from rhabdo - cont fluids


slightly elevated lipase - from gastroenteritis/vomiting, fluids


rhabdomyolysis - will trend CK, cont fluids


hypokalemia - replacement


hypomagnesemia - replacement


generalized weakness - likely from above problems - will get ambulatory. Follow 

phos


dm2, uncontrolled - A1c was 7.3, cannot tolerate oral meds, insulin while in 

house


htn - cont meds


hld - cont meds


GERD - PPI


anxiety - benadryl prn


alcoholism - cutting back


previous smoker - does not wish for patch


small rt lung nodules - seen by pulm


chronic back pain - on meds





talked to son and exwife at bedside.





Vitals


Vitals





Vital Signs








  Date Time  Temp Pulse Resp B/P (MAP) Pulse Ox O2 Delivery O2 Flow Rate FiO2


 


10/3/18 06:34     97 Room Air  


 


10/3/18 03:00 97.9 67 18 137/84 (101)    





 97.9       











Physical Exam


General:  Alert, Oriented X3, Cooperative


Heart:  Regular rate, Normal S1, Normal S2


Lungs:  Clear


Abdomen:  Normal bowel sounds, Other (focal epigastric tenderness)


Extremities:  No edema, Normal pulses, No tenderness/swelling


Skin:  No rashes, No breakdown, No significant lesion





Labs


LABS





Laboratory Tests








Test


 10/2/18


09:41 10/2/18


10:20 10/2/18


10:25 10/2/18


16:56


 


Glucose (Fingerstick)


 242 mg/dL


(70-99) 


 


 164 mg/dL


(70-99)


 


Urine Collection Type  Unknown   


 


Urine Color  Shazia   


 


Urine Clarity  Clear   


 


Urine pH  6.0   


 


Urine Specific Gravity  1.025   


 


Urine Protein


 


 100 mg/dL


(NEG-TRACE) 


 





 


Urine Glucose (UA)


 


 100 mg/dL


(NEG) 


 





 


Urine Ketones (Stick)


 


 Negative mg/dL


(NEG) 


 





 


Urine Blood  Negative (NEG)   


 


Urine Nitrite  Negative (NEG)   


 


Urine Bilirubin  Negative (NEG)   


 


Urine Urobilinogen Dipstick


 


 1.0 mg/dL (0.2


mg/dL) 


 





 


Urine Leukocyte Esterase  Negative (NEG)   


 


Urine RBC  0 /HPF (0-2)   


 


Urine WBC


 


 5-10 /HPF


(0-4) 


 





 


Urine Bacteria


 


 Mod /HPF


(0-FEW) 


 





 


White Blood Count


 


 


 17.1 x10^3/uL


(4.0-11.0) 





 


Red Blood Count


 


 


 4.74 x10^6/uL


(4.30-5.70) 





 


Hemoglobin


 


 


 15.9 g/dL


(13.0-17.5) 





 


Hematocrit


 


 


 45.0 %


(39.0-53.0) 





 


Mean Corpuscular Volume   95 fL ()  


 


Mean Corpuscular Hemoglobin   34 pg (25-35)  


 


Mean Corpuscular Hemoglobin


Concent 


 


 35 g/dL


(31-37) 





 


Red Cell Distribution Width


 


 


 13.6 %


(11.5-14.5) 





 


Platelet Count


 


 


 424 x10^3/uL


(140-400) 





 


Neutrophils (%) (Auto)   76 % (31-73)  


 


Lymphocytes (%) (Auto)   15 % (24-48)  


 


Monocytes (%) (Auto)   8 % (0-9)  


 


Eosinophils (%) (Auto)   1 % (0-3)  


 


Basophils (%) (Auto)   0 % (0-3)  


 


Neutrophils # (Auto)


 


 


 13.0 x10^3uL


(1.8-7.7) 





 


Lymphocytes # (Auto)


 


 


 2.6 x10^3/uL


(1.0-4.8) 





 


Monocytes # (Auto)


 


 


 1.3 x10^3/uL


(0.0-1.1) 





 


Eosinophils # (Auto)


 


 


 0.1 x10^3/uL


(0.0-0.7) 





 


Basophils # (Auto)


 


 


 0.1 x10^3/uL


(0.0-0.2) 





 


Segmented Neutrophils %   79 % (35-66)  


 


Lymphocytes %   15 % (24-48)  


 


Monocytes %   2 % (0-10)  


 


Eosinophils %   2 % (0-5)  


 


Basophils %   2 % (0-3)  


 


Platelet Estimate


 


 


 Increased


(ADEQUATE) 





 


Large Platelets   Few  


 


Giant Platelets   Occ  


 


Sodium Level


 


 


 139 mmol/L


(136-145) 





 


Potassium Level


 


 


 2.6 mmol/L


(3.5-5.1) 





 


Chloride Level


 


 


 94 mmol/L


() 





 


Carbon Dioxide Level


 


 


 27 mmol/L


(21-32) 





 


Anion Gap   18 (6-14)  


 


Blood Urea Nitrogen


 


 


 30 mg/dL


(8-26) 





 


Creatinine


 


 


 1.7 mg/dL


(0.7-1.3) 





 


Estimated GFR


(Cockcroft-Gault) 


 


 44.0 


 





 


BUN/Creatinine Ratio   18 (6-20)  


 


Glucose Level


 


 


 239 mg/dL


(70-99) 





 


Hemoglobin A1c


 


 


 7.3 %


(4.8-5.6) 





 


Calcium Level


 


 


 10.0 mg/dL


(8.5-10.1) 





 


Phosphorus Level


 


 


 1.9 mg/dL


(2.6-4.7) 





 


Magnesium Level


 


 


 0.3 mg/dL


(1.8-2.4) 





 


Total Bilirubin


 


 


 0.9 mg/dL


(0.2-1.0) 





 


Aspartate Amino Transf


(AST/SGOT) 


 


 34 U/L (15-37) 


 





 


Alanine Aminotransferase


(ALT/SGPT) 


 


 34 U/L (16-63) 


 





 


Alkaline Phosphatase


 


 


 65 U/L


() 





 


Creatine Kinase


 


 


 1745 U/L


() 





 


Troponin I Quantitative


 


 


 < 0.017 ng/mL


(0.000-0.055) 





 


Total Protein


 


 


 9.4 g/dL


(6.4-8.2) 





 


Albumin


 


 


 4.7 g/dL


(3.4-5.0) 





 


Albumin/Globulin Ratio   1.0 (1.0-1.7)  


 


Lipase


 


 


 428 U/L


() 





 


Test


 10/2/18


20:20 10/2/18


20:23 10/2/18


22:30 10/3/18


04:30


 


Sodium Level


 141 mmol/L


(136-145) 


 


 142 mmol/L


(136-145)


 


Potassium Level


 3.2 mmol/L


(3.5-5.1) 


 


 3.0 mmol/L


(3.5-5.1)


 


Chloride Level


 101 mmol/L


() 


 


 102 mmol/L


()


 


Carbon Dioxide Level


 29 mmol/L


(21-32) 


 


 29 mmol/L


(21-32)


 


Anion Gap 11 (6-14)    11 (6-14) 


 


Blood Urea Nitrogen


 25 mg/dL


(8-26) 


 


 21 mg/dL


(8-26)


 


Creatinine


 1.3 mg/dL


(0.7-1.3) 


 


 1.2 mg/dL


(0.7-1.3)


 


Estimated GFR


(Cockcroft-Gault) 60.0 


 


 


 65.8 





 


Glucose Level


 153 mg/dL


(70-99) 


 


 173 mg/dL


(70-99)


 


Calcium Level


 8.8 mg/dL


(8.5-10.1) 


 


 8.3 mg/dL


(8.5-10.1)


 


Magnesium Level


 1.0 mg/dL


(1.8-2.4) 


 


 2.0 mg/dL


(1.8-2.4)


 


Glucose (Fingerstick)


 


 145 mg/dL


(70-99) 


 





 


Urine Opiates Screen   Pos (NEG)  


 


Urine Methadone Screen   Neg (NEG)  


 


Urine Barbiturates   Neg (NEG)  


 


Urine Phencyclidine Screen   Neg (NEG)  


 


Urine


Amphetamine/Methamphetamine 


 


 Neg (NEG) 


 





 


Urine Benzodiazepines Screen   Neg (NEG)  


 


Urine Cocaine Screen   Neg (NEG)  


 


Urine Cannabinoids Screen   Pos (NEG)  


 


Urine Ethyl Alcohol   Neg (NEG)  


 


White Blood Count


 


 


 


 10.7 x10^3/uL


(4.0-11.0)


 


Red Blood Count


 


 


 


 4.25 x10^6/uL


(4.30-5.70)


 


Hemoglobin


 


 


 


 14.4 g/dL


(13.0-17.5)


 


Hematocrit


 


 


 


 40.5 %


(39.0-53.0)


 


Mean Corpuscular Volume    95 fL () 


 


Mean Corpuscular Hemoglobin    34 pg (25-35) 


 


Mean Corpuscular Hemoglobin


Concent 


 


 


 36 g/dL


(31-37)


 


Red Cell Distribution Width


 


 


 


 13.6 %


(11.5-14.5)


 


Platelet Count


 


 


 


 334 x10^3/uL


(140-400)


 


Neutrophils (%) (Auto)    62 % (31-73) 


 


Lymphocytes (%) (Auto)    29 % (24-48) 


 


Monocytes (%) (Auto)    7 % (0-9) 


 


Eosinophils (%) (Auto)    1 % (0-3) 


 


Basophils (%) (Auto)    1 % (0-3) 


 


Neutrophils # (Auto)


 


 


 


 6.6 x10^3uL


(1.8-7.7)


 


Lymphocytes # (Auto)


 


 


 


 3.1 x10^3/uL


(1.0-4.8)


 


Monocytes # (Auto)


 


 


 


 0.8 x10^3/uL


(0.0-1.1)


 


Eosinophils # (Auto)


 


 


 


 0.1 x10^3/uL


(0.0-0.7)


 


Basophils # (Auto)


 


 


 


 0.1 x10^3/uL


(0.0-0.2)


 


BUN/Creatinine Ratio    18 (6-20) 


 


Total Bilirubin


 


 


 


 0.8 mg/dL


(0.2-1.0)


 


Aspartate Amino Transf


(AST/SGOT) 


 


 


 32 U/L (15-37) 





 


Alanine Aminotransferase


(ALT/SGPT) 


 


 


 28 U/L (16-63) 





 


Alkaline Phosphatase


 


 


 


 61 U/L


()


 


Creatine Kinase


 


 


 


 1129 U/L


()


 


Total Protein


 


 


 


 7.4 g/dL


(6.4-8.2)


 


Albumin


 


 


 


 3.6 g/dL


(3.4-5.0)


 


Albumin/Globulin Ratio    0.9 (1.0-1.7) 


 


Lipase


 


 


 


 385 U/L


()











Review of Systems


Review of Systems


Card - no CP or palpitations


Resp - no SOB or TORRES


Neuro - no focal deficits


Endo - no heat or cold intolerance





Assessment and Plan


Assessmemt and Plan


Problems


Medical Problems:


(1) TYLER (acute kidney injury)


Status: Acute  





(2) Hypokalemia


Status: Acute  





(3) Hypomagnesemia


Status: Acute  





(4) Rhabdomyolysis


Status: Acute  





(5) Vomiting


Status: Acute  











Comment


Review of Relevant


I have reviewed the following items abhijit (where applicable) has been applied.


Labs





Laboratory Tests








Test


 10/2/18


09:41 10/2/18


10:20 10/2/18


10:25 10/2/18


16:56


 


Glucose (Fingerstick)


 242 mg/dL


(70-99) 


 


 164 mg/dL


(70-99)


 


Urine Collection Type  Unknown   


 


Urine Color  Shazia   


 


Urine Clarity  Clear   


 


Urine pH  6.0   


 


Urine Specific Gravity  1.025   


 


Urine Protein


 


 100 mg/dL


(NEG-TRACE) 


 





 


Urine Glucose (UA)


 


 100 mg/dL


(NEG) 


 





 


Urine Ketones (Stick)


 


 Negative mg/dL


(NEG) 


 





 


Urine Blood  Negative (NEG)   


 


Urine Nitrite  Negative (NEG)   


 


Urine Bilirubin  Negative (NEG)   


 


Urine Urobilinogen Dipstick


 


 1.0 mg/dL (0.2


mg/dL) 


 





 


Urine Leukocyte Esterase  Negative (NEG)   


 


Urine RBC  0 /HPF (0-2)   


 


Urine WBC


 


 5-10 /HPF


(0-4) 


 





 


Urine Bacteria


 


 Mod /HPF


(0-FEW) 


 





 


White Blood Count


 


 


 17.1 x10^3/uL


(4.0-11.0) 





 


Red Blood Count


 


 


 4.74 x10^6/uL


(4.30-5.70) 





 


Hemoglobin


 


 


 15.9 g/dL


(13.0-17.5) 





 


Hematocrit


 


 


 45.0 %


(39.0-53.0) 





 


Mean Corpuscular Volume   95 fL ()  


 


Mean Corpuscular Hemoglobin   34 pg (25-35)  


 


Mean Corpuscular Hemoglobin


Concent 


 


 35 g/dL


(31-37) 





 


Red Cell Distribution Width


 


 


 13.6 %


(11.5-14.5) 





 


Platelet Count


 


 


 424 x10^3/uL


(140-400) 





 


Neutrophils (%) (Auto)   76 % (31-73)  


 


Lymphocytes (%) (Auto)   15 % (24-48)  


 


Monocytes (%) (Auto)   8 % (0-9)  


 


Eosinophils (%) (Auto)   1 % (0-3)  


 


Basophils (%) (Auto)   0 % (0-3)  


 


Neutrophils # (Auto)


 


 


 13.0 x10^3uL


(1.8-7.7) 





 


Lymphocytes # (Auto)


 


 


 2.6 x10^3/uL


(1.0-4.8) 





 


Monocytes # (Auto)


 


 


 1.3 x10^3/uL


(0.0-1.1) 





 


Eosinophils # (Auto)


 


 


 0.1 x10^3/uL


(0.0-0.7) 





 


Basophils # (Auto)


 


 


 0.1 x10^3/uL


(0.0-0.2) 





 


Segmented Neutrophils %   79 % (35-66)  


 


Lymphocytes %   15 % (24-48)  


 


Monocytes %   2 % (0-10)  


 


Eosinophils %   2 % (0-5)  


 


Basophils %   2 % (0-3)  


 


Platelet Estimate


 


 


 Increased


(ADEQUATE) 





 


Large Platelets   Few  


 


Giant Platelets   Occ  


 


Sodium Level


 


 


 139 mmol/L


(136-145) 





 


Potassium Level


 


 


 2.6 mmol/L


(3.5-5.1) 





 


Chloride Level


 


 


 94 mmol/L


() 





 


Carbon Dioxide Level


 


 


 27 mmol/L


(21-32) 





 


Anion Gap   18 (6-14)  


 


Blood Urea Nitrogen


 


 


 30 mg/dL


(8-26) 





 


Creatinine


 


 


 1.7 mg/dL


(0.7-1.3) 





 


Estimated GFR


(Cockcroft-Gault) 


 


 44.0 


 





 


BUN/Creatinine Ratio   18 (6-20)  


 


Glucose Level


 


 


 239 mg/dL


(70-99) 





 


Hemoglobin A1c


 


 


 7.3 %


(4.8-5.6) 





 


Calcium Level


 


 


 10.0 mg/dL


(8.5-10.1) 





 


Phosphorus Level


 


 


 1.9 mg/dL


(2.6-4.7) 





 


Magnesium Level


 


 


 0.3 mg/dL


(1.8-2.4) 





 


Total Bilirubin


 


 


 0.9 mg/dL


(0.2-1.0) 





 


Aspartate Amino Transf


(AST/SGOT) 


 


 34 U/L (15-37) 


 





 


Alanine Aminotransferase


(ALT/SGPT) 


 


 34 U/L (16-63) 


 





 


Alkaline Phosphatase


 


 


 65 U/L


() 





 


Creatine Kinase


 


 


 1745 U/L


() 





 


Troponin I Quantitative


 


 


 < 0.017 ng/mL


(0.000-0.055) 





 


Total Protein


 


 


 9.4 g/dL


(6.4-8.2) 





 


Albumin


 


 


 4.7 g/dL


(3.4-5.0) 





 


Albumin/Globulin Ratio   1.0 (1.0-1.7)  


 


Lipase


 


 


 428 U/L


() 





 


Test


 10/2/18


20:20 10/2/18


20:23 10/2/18


22:30 10/3/18


04:30


 


Sodium Level


 141 mmol/L


(136-145) 


 


 142 mmol/L


(136-145)


 


Potassium Level


 3.2 mmol/L


(3.5-5.1) 


 


 3.0 mmol/L


(3.5-5.1)


 


Chloride Level


 101 mmol/L


() 


 


 102 mmol/L


()


 


Carbon Dioxide Level


 29 mmol/L


(21-32) 


 


 29 mmol/L


(21-32)


 


Anion Gap 11 (6-14)    11 (6-14) 


 


Blood Urea Nitrogen


 25 mg/dL


(8-26) 


 


 21 mg/dL


(8-26)


 


Creatinine


 1.3 mg/dL


(0.7-1.3) 


 


 1.2 mg/dL


(0.7-1.3)


 


Estimated GFR


(Cockcroft-Gault) 60.0 


 


 


 65.8 





 


Glucose Level


 153 mg/dL


(70-99) 


 


 173 mg/dL


(70-99)


 


Calcium Level


 8.8 mg/dL


(8.5-10.1) 


 


 8.3 mg/dL


(8.5-10.1)


 


Magnesium Level


 1.0 mg/dL


(1.8-2.4) 


 


 2.0 mg/dL


(1.8-2.4)


 


Glucose (Fingerstick)


 


 145 mg/dL


(70-99) 


 





 


Urine Opiates Screen   Pos (NEG)  


 


Urine Methadone Screen   Neg (NEG)  


 


Urine Barbiturates   Neg (NEG)  


 


Urine Phencyclidine Screen   Neg (NEG)  


 


Urine


Amphetamine/Methamphetamine 


 


 Neg (NEG) 


 





 


Urine Benzodiazepines Screen   Neg (NEG)  


 


Urine Cocaine Screen   Neg (NEG)  


 


Urine Cannabinoids Screen   Pos (NEG)  


 


Urine Ethyl Alcohol   Neg (NEG)  


 


White Blood Count


 


 


 


 10.7 x10^3/uL


(4.0-11.0)


 


Red Blood Count


 


 


 


 4.25 x10^6/uL


(4.30-5.70)


 


Hemoglobin


 


 


 


 14.4 g/dL


(13.0-17.5)


 


Hematocrit


 


 


 


 40.5 %


(39.0-53.0)


 


Mean Corpuscular Volume    95 fL () 


 


Mean Corpuscular Hemoglobin    34 pg (25-35) 


 


Mean Corpuscular Hemoglobin


Concent 


 


 


 36 g/dL


(31-37)


 


Red Cell Distribution Width


 


 


 


 13.6 %


(11.5-14.5)


 


Platelet Count


 


 


 


 334 x10^3/uL


(140-400)


 


Neutrophils (%) (Auto)    62 % (31-73) 


 


Lymphocytes (%) (Auto)    29 % (24-48) 


 


Monocytes (%) (Auto)    7 % (0-9) 


 


Eosinophils (%) (Auto)    1 % (0-3) 


 


Basophils (%) (Auto)    1 % (0-3) 


 


Neutrophils # (Auto)


 


 


 


 6.6 x10^3uL


(1.8-7.7)


 


Lymphocytes # (Auto)


 


 


 


 3.1 x10^3/uL


(1.0-4.8)


 


Monocytes # (Auto)


 


 


 


 0.8 x10^3/uL


(0.0-1.1)


 


Eosinophils # (Auto)


 


 


 


 0.1 x10^3/uL


(0.0-0.7)


 


Basophils # (Auto)


 


 


 


 0.1 x10^3/uL


(0.0-0.2)


 


BUN/Creatinine Ratio    18 (6-20) 


 


Total Bilirubin


 


 


 


 0.8 mg/dL


(0.2-1.0)


 


Aspartate Amino Transf


(AST/SGOT) 


 


 


 32 U/L (15-37) 





 


Alanine Aminotransferase


(ALT/SGPT) 


 


 


 28 U/L (16-63) 





 


Alkaline Phosphatase


 


 


 


 61 U/L


()


 


Creatine Kinase


 


 


 


 1129 U/L


()


 


Total Protein


 


 


 


 7.4 g/dL


(6.4-8.2)


 


Albumin


 


 


 


 3.6 g/dL


(3.4-5.0)


 


Albumin/Globulin Ratio    0.9 (1.0-1.7) 


 


Lipase


 


 


 


 385 U/L


()








Laboratory Tests








Test


 10/2/18


09:41 10/2/18


10:20 10/2/18


10:25 10/2/18


16:56


 


Glucose (Fingerstick)


 242 mg/dL


(70-99) 


 


 164 mg/dL


(70-99)


 


Urine Collection Type  Unknown   


 


Urine Color  Shazia   


 


Urine Clarity  Clear   


 


Urine pH  6.0   


 


Urine Specific Gravity  1.025   


 


Urine Protein


 


 100 mg/dL


(NEG-TRACE) 


 





 


Urine Glucose (UA)


 


 100 mg/dL


(NEG) 


 





 


Urine Ketones (Stick)


 


 Negative mg/dL


(NEG) 


 





 


Urine Blood  Negative (NEG)   


 


Urine Nitrite  Negative (NEG)   


 


Urine Bilirubin  Negative (NEG)   


 


Urine Urobilinogen Dipstick


 


 1.0 mg/dL (0.2


mg/dL) 


 





 


Urine Leukocyte Esterase  Negative (NEG)   


 


Urine RBC  0 /HPF (0-2)   


 


Urine WBC


 


 5-10 /HPF


(0-4) 


 





 


Urine Bacteria


 


 Mod /HPF


(0-FEW) 


 





 


White Blood Count


 


 


 17.1 x10^3/uL


(4.0-11.0) 





 


Red Blood Count


 


 


 4.74 x10^6/uL


(4.30-5.70) 





 


Hemoglobin


 


 


 15.9 g/dL


(13.0-17.5) 





 


Hematocrit


 


 


 45.0 %


(39.0-53.0) 





 


Mean Corpuscular Volume   95 fL ()  


 


Mean Corpuscular Hemoglobin   34 pg (25-35)  


 


Mean Corpuscular Hemoglobin


Concent 


 


 35 g/dL


(31-37) 





 


Red Cell Distribution Width


 


 


 13.6 %


(11.5-14.5) 





 


Platelet Count


 


 


 424 x10^3/uL


(140-400) 





 


Neutrophils (%) (Auto)   76 % (31-73)  


 


Lymphocytes (%) (Auto)   15 % (24-48)  


 


Monocytes (%) (Auto)   8 % (0-9)  


 


Eosinophils (%) (Auto)   1 % (0-3)  


 


Basophils (%) (Auto)   0 % (0-3)  


 


Neutrophils # (Auto)


 


 


 13.0 x10^3uL


(1.8-7.7) 





 


Lymphocytes # (Auto)


 


 


 2.6 x10^3/uL


(1.0-4.8) 





 


Monocytes # (Auto)


 


 


 1.3 x10^3/uL


(0.0-1.1) 





 


Eosinophils # (Auto)


 


 


 0.1 x10^3/uL


(0.0-0.7) 





 


Basophils # (Auto)


 


 


 0.1 x10^3/uL


(0.0-0.2) 





 


Segmented Neutrophils %   79 % (35-66)  


 


Lymphocytes %   15 % (24-48)  


 


Monocytes %   2 % (0-10)  


 


Eosinophils %   2 % (0-5)  


 


Basophils %   2 % (0-3)  


 


Platelet Estimate


 


 


 Increased


(ADEQUATE) 





 


Large Platelets   Few  


 


Giant Platelets   Occ  


 


Sodium Level


 


 


 139 mmol/L


(136-145) 





 


Potassium Level


 


 


 2.6 mmol/L


(3.5-5.1) 





 


Chloride Level


 


 


 94 mmol/L


() 





 


Carbon Dioxide Level


 


 


 27 mmol/L


(21-32) 





 


Anion Gap   18 (6-14)  


 


Blood Urea Nitrogen


 


 


 30 mg/dL


(8-26) 





 


Creatinine


 


 


 1.7 mg/dL


(0.7-1.3) 





 


Estimated GFR


(Cockcroft-Gault) 


 


 44.0 


 





 


BUN/Creatinine Ratio   18 (6-20)  


 


Glucose Level


 


 


 239 mg/dL


(70-99) 





 


Hemoglobin A1c


 


 


 7.3 %


(4.8-5.6) 





 


Calcium Level


 


 


 10.0 mg/dL


(8.5-10.1) 





 


Phosphorus Level


 


 


 1.9 mg/dL


(2.6-4.7) 





 


Magnesium Level


 


 


 0.3 mg/dL


(1.8-2.4) 





 


Total Bilirubin


 


 


 0.9 mg/dL


(0.2-1.0) 





 


Aspartate Amino Transf


(AST/SGOT) 


 


 34 U/L (15-37) 


 





 


Alanine Aminotransferase


(ALT/SGPT) 


 


 34 U/L (16-63) 


 





 


Alkaline Phosphatase


 


 


 65 U/L


() 





 


Creatine Kinase


 


 


 1745 U/L


() 





 


Troponin I Quantitative


 


 


 < 0.017 ng/mL


(0.000-0.055) 





 


Total Protein


 


 


 9.4 g/dL


(6.4-8.2) 





 


Albumin


 


 


 4.7 g/dL


(3.4-5.0) 





 


Albumin/Globulin Ratio   1.0 (1.0-1.7)  


 


Lipase


 


 


 428 U/L


() 





 


Test


 10/2/18


20:20 10/2/18


20:23 10/2/18


22:30 10/3/18


04:30


 


Sodium Level


 141 mmol/L


(136-145) 


 


 142 mmol/L


(136-145)


 


Potassium Level


 3.2 mmol/L


(3.5-5.1) 


 


 3.0 mmol/L


(3.5-5.1)


 


Chloride Level


 101 mmol/L


() 


 


 102 mmol/L


()


 


Carbon Dioxide Level


 29 mmol/L


(21-32) 


 


 29 mmol/L


(21-32)


 


Anion Gap 11 (6-14)    11 (6-14) 


 


Blood Urea Nitrogen


 25 mg/dL


(8-26) 


 


 21 mg/dL


(8-26)


 


Creatinine


 1.3 mg/dL


(0.7-1.3) 


 


 1.2 mg/dL


(0.7-1.3)


 


Estimated GFR


(Cockcroft-Gault) 60.0 


 


 


 65.8 





 


Glucose Level


 153 mg/dL


(70-99) 


 


 173 mg/dL


(70-99)


 


Calcium Level


 8.8 mg/dL


(8.5-10.1) 


 


 8.3 mg/dL


(8.5-10.1)


 


Magnesium Level


 1.0 mg/dL


(1.8-2.4) 


 


 2.0 mg/dL


(1.8-2.4)


 


Glucose (Fingerstick)


 


 145 mg/dL


(70-99) 


 





 


Urine Opiates Screen   Pos (NEG)  


 


Urine Methadone Screen   Neg (NEG)  


 


Urine Barbiturates   Neg (NEG)  


 


Urine Phencyclidine Screen   Neg (NEG)  


 


Urine


Amphetamine/Methamphetamine 


 


 Neg (NEG) 


 





 


Urine Benzodiazepines Screen   Neg (NEG)  


 


Urine Cocaine Screen   Neg (NEG)  


 


Urine Cannabinoids Screen   Pos (NEG)  


 


Urine Ethyl Alcohol   Neg (NEG)  


 


White Blood Count


 


 


 


 10.7 x10^3/uL


(4.0-11.0)


 


Red Blood Count


 


 


 


 4.25 x10^6/uL


(4.30-5.70)


 


Hemoglobin


 


 


 


 14.4 g/dL


(13.0-17.5)


 


Hematocrit


 


 


 


 40.5 %


(39.0-53.0)


 


Mean Corpuscular Volume    95 fL () 


 


Mean Corpuscular Hemoglobin    34 pg (25-35) 


 


Mean Corpuscular Hemoglobin


Concent 


 


 


 36 g/dL


(31-37)


 


Red Cell Distribution Width


 


 


 


 13.6 %


(11.5-14.5)


 


Platelet Count


 


 


 


 334 x10^3/uL


(140-400)


 


Neutrophils (%) (Auto)    62 % (31-73) 


 


Lymphocytes (%) (Auto)    29 % (24-48) 


 


Monocytes (%) (Auto)    7 % (0-9) 


 


Eosinophils (%) (Auto)    1 % (0-3) 


 


Basophils (%) (Auto)    1 % (0-3) 


 


Neutrophils # (Auto)


 


 


 


 6.6 x10^3uL


(1.8-7.7)


 


Lymphocytes # (Auto)


 


 


 


 3.1 x10^3/uL


(1.0-4.8)


 


Monocytes # (Auto)


 


 


 


 0.8 x10^3/uL


(0.0-1.1)


 


Eosinophils # (Auto)


 


 


 


 0.1 x10^3/uL


(0.0-0.7)


 


Basophils # (Auto)


 


 


 


 0.1 x10^3/uL


(0.0-0.2)


 


BUN/Creatinine Ratio    18 (6-20) 


 


Total Bilirubin


 


 


 


 0.8 mg/dL


(0.2-1.0)


 


Aspartate Amino Transf


(AST/SGOT) 


 


 


 32 U/L (15-37) 





 


Alanine Aminotransferase


(ALT/SGPT) 


 


 


 28 U/L (16-63) 





 


Alkaline Phosphatase


 


 


 


 61 U/L


()


 


Creatine Kinase


 


 


 


 1129 U/L


()


 


Total Protein


 


 


 


 7.4 g/dL


(6.4-8.2)


 


Albumin


 


 


 


 3.6 g/dL


(3.4-5.0)


 


Albumin/Globulin Ratio    0.9 (1.0-1.7) 


 


Lipase


 


 


 


 385 U/L


()








Medications





Current Medications


Ondansetron HCl (Zofran) 4 mg 1X  ONCE IV  Last administered on 10/2/18at 10:41

;  Start 10/2/18 at 09:45;  Stop 10/2/18 at 09:55;  Status DC


Morphine Sulfate (Morphine Sulfate) 4 mg 1X  ONCE IV  Last administered on 10/2/

18at 10:41;  Start 10/2/18 at 09:45;  Stop 10/2/18 at 09:55;  Status DC


Sodium Chloride 1,000 ml @  1,000 mls/hr 1X  ONCE IV  Last administered on 10/2/

18at 10:41;  Start 10/2/18 at 10:15;  Stop 10/2/18 at 11:14;  Status DC


Iohexol (Omnipaque 300 Mg/ml) 60 ml 1X  ONCE IV  Last administered on 10/2/18at 

11:30;  Start 10/2/18 at 11:30;  Stop 10/2/18 at 11:31;  Status DC


Sodium Chloride 1,000 ml @  1,000 mls/hr 1X  ONCE IV  Last administered on 10/2/

18at 12:56;  Start 10/2/18 at 11:30;  Stop 10/2/18 at 12:29;  Status DC


Potassium Chloride (Klor-Con) 40 meq 1X  ONCE PO  Last administered on 10/2/

18at 11:43;  Start 10/2/18 at 11:30;  Stop 10/2/18 at 11:31;  Status DC


Info (CONTRAST GIVEN -- Rx MONITORING) 1 each PRN DAILY  PRN MC SEE COMMENTS;  

Start 10/2/18 at 11:30;  Stop 10/4/18 at 11:29


Magnesium Sulfate 50 ml @ 25 mls/hr 1X  ONCE IV  Last administered on 10/2/18at 

12:57;  Start 10/2/18 at 12:45;  Stop 10/2/18 at 14:44;  Status DC


Ondansetron HCl (Zofran) 4 mg PRN Q8HRS  PRN IV NAUSEA/VOMITING Last 

administered on 10/2/18at 15:16;  Start 10/2/18 at 13:00;  Stop 10/3/18 at 12:59


Fentanyl Citrate (Fentanyl 2ml Vial) 50 mcg PRN Q1HR  PRN IV PAIN;  Start 10/2/

18 at 13:00;  Stop 10/3/18 at 12:59


Acetaminophen (Tylenol) 650 mg PRN Q4HRS  PRN PO FEVER;  Start 10/2/18 at 13:00

;  Stop 10/3/18 at 12:59


Magnesium Sulfate/ Dextrose 100 ml @  25 mls/hr 1X  ONCE IV  Last administered 

on 10/3/18at 02:23;  Start 10/2/18 at 14:30;  Stop 10/2/18 at 18:29;  Status DC


Potassium Chloride/Water 100 ml @  100 mls/hr Q1H IV  Last administered on 10/2/

18at 21:20;  Start 10/2/18 at 15:00;  Stop 10/2/18 at 18:59;  Status DC


Potassium Chloride/Sodium Chloride 1,000 ml @  125 mls/hr Q8H IV  Last 

administered on 10/3/18at 06:28;  Start 10/2/18 at 14:30


Acetaminophen (Tylenol) 650 mg PRN Q6HRS  PRN PO FEVER;  Start 10/2/18 at 14:30


Ondansetron HCl (Zofran) 4 mg PRN Q6HRS  PRN IV NAUSEA/VOMITING;  Start 10/2/18 

at 14:30


Morphine Sulfate (Morphine Sulfate) 2 mg PRN Q2HR  PRN IV MODERATE TO SEVERE 

PAIN Last administered on 10/2/18at 15:16;  Start 10/2/18 at 14:30


Tramadol HCl (Ultram) 50 mg PRN Q6HRS  PRN PO MILD TO MODERATE PAIN Last 

administered on 10/3/18at 06:34;  Start 10/2/18 at 14:30


Docusate Sodium (Colace) 100 mg PRN DAILY  PRN PO CONSTIPATION;  Start 10/2/18 

at 14:30


Labetalol HCl (Normodyne Iv Push) 20 mg PRN Q2HR  PRN IVP HYPERTENSION, SEE 

COMMENTS;  Start 10/2/18 at 14:30


Insulin Human Lispro (HumaLOG) 0-9 UNITS TIDWMEALS SQ ;  Start 10/2/18 at 17:00


Dextrose (Dextrose 50%-Water Syringe) 12.5 gm PRN Q15MIN  PRN IV SEE COMMENTS;  

Start 10/2/18 at 14:30


Heparin Sodium (Porcine) (Heparin Sq) 5,000 unit Q8HRS SQ  Last administered on 

10/3/18at 06:32;  Start 10/2/18 at 15:00


Acetaminophen/ Hydrocodone Bitart (Lortab 5/325) 1 tab PRN Q4HRS  PRN PO 

MODERATE PAIN Last administered on 10/2/18at 22:28;  Start 10/2/18 at 14:30


Cyclobenzaprine HCl (Flexeril) 10 mg PRN Q6HRS  PRN PO MUSCLE SPASMS Last 

administered on 10/3/18at 06:34;  Start 10/2/18 at 14:30


Diphenhydramine HCl (Benadryl) 25 mg PRN Q6HRS  PRN IVP ITCHING;  Start 10/2/18 

at 14:30


Famotidine (Pepcid Vial) 20 mg QHS IVP ;  Start 10/2/18 at 21:00;  Stop 10/2/18 

at 21:00;  Status DC


Pantoprazole Sodium (Protonix) 40 mg DAILYAC PO ;  Start 10/2/18 at 16:30;  

Stop 10/2/18 at 16:56;  Status DC


Potassium Phosphate 10 mmol/ Dextrose 103.3333 ml @  51.667 m... Q2H IV  Last 

administered on 10/3/18at 00:23;  Start 10/2/18 at 16:00;  Stop 10/2/18 at 19:59

;  Status DC


Atorvastatin Calcium (Lipitor) 20 mg HS PO  Last administered on 10/2/18at 20:30

;  Start 10/2/18 at 21:00


Metoprolol Succinate (Toprol Xl) 50 mg BID PO  Last administered on 10/2/18at 20

:29;  Start 10/2/18 at 21:00


Benzonatate (Tessalon Perle) 100 mg BID PO  Last administered on 10/2/18at 20:29

;  Start 10/2/18 at 21:00


Fenofibrate (Lofibra) 134 mg DAILY PO ;  Start 10/3/18 at 09:00


Paroxetine HCl (Paxil) 20 mg HS PO  Last administered on 10/2/18at 20:30;  

Start 10/2/18 at 21:00


Losartan Potassium (Cozaar) 100 mg HS PO  Last administered on 10/2/18at 20:30;

  Start 10/2/18 at 21:00


Pantoprazole Sodium (Protonix) 40 mg DAILYAC PO ;  Start 10/3/18 at 07:30





Active Scripts


Active


Reported


Atorvastatin Calcium 20 Mg Tablet 1 Tab PO HS


Losartan Potassium 100 Mg Tablet 100 Mg PO HS


Paroxetine Hcl 20 Mg Tablet 1 Tab PO HS


Fenofibrate (Fenofibrate,Micronized) 134 Mg Capsule 1 Cap PO DAILY


Benzonatate 100 Mg Capsule 1 Cap PO BID


Metoprolol Succinate ( Xl ) (Metoprolol Succinate) 25 Mg Tab.er.24h 2 Tab PO BID


Omeprazole 20 Mg Capsule. 1 Cap PO DAILY


Vitals/I & O





Vital Sign - Last 24 Hours








 10/2/18 10/2/18 10/2/18 10/2/18





 09:35 10:02 10:32 11:02


 


Temp 98.4   





 98.4   


 


Pulse 82 70 72 64


 


Resp 20 16 16 16


 


B/P (MAP) 136/78 (97)   


 


Pulse Ox 100 100 99 95


 


O2 Delivery Room Air   


 


    





    





 10/2/18 10/2/18 10/2/18 10/2/18





 11:34 12:34 13:34 14:15


 


Temp    99.3





    99.3


 


Pulse 77 73 80 76


 


Resp 16 16 16 18


 


B/P (MAP)    126/64 (84)


 


Pulse Ox 96 100 100 98


 


O2 Delivery    Room Air


 


    





    





 10/2/18 10/2/18 10/2/18 10/2/18





 15:16 15:55 19:00 19:43


 


Temp   98.4 





   98.4 


 


Pulse   79 


 


Resp   18 


 


B/P (MAP)   141/85 (103) 


 


Pulse Ox   94 


 


O2 Delivery Room Air Room Air Room Air Room Air


 


    





    





 10/2/18 10/2/18 10/2/18 10/2/18





 20:29 20:30 22:28 23:00


 


Temp    98.5





    98.5


 


Pulse 79 79  81


 


Resp    18


 


B/P (MAP) 141/85 141/85  152/106 (121)


 


Pulse Ox   94 96


 


O2 Delivery   Room Air Room Air


 


    





    





 10/2/18 10/3/18 10/3/18 





 23:28 03:00 06:34 


 


Temp  97.9  





  97.9  


 


Pulse  67  


 


Resp  18  


 


B/P (MAP)  137/84 (101)  


 


Pulse Ox 96 97 97 


 


O2 Delivery Room Air Room Air Room Air 














Intake and Output   


 


 10/2/18 10/2/18 10/3/18





 15:00 23:00 07:00


 


Intake Total 1000 ml 610 ml 480 ml


 


Output Total  250 ml 1050 ml


 


Balance 1000 ml 360 ml -570 ml

















CHRISSY RATLIFF MD Oct 3, 2018 07:29

## 2018-10-04 VITALS
SYSTOLIC BLOOD PRESSURE: 136 MMHG | DIASTOLIC BLOOD PRESSURE: 94 MMHG | DIASTOLIC BLOOD PRESSURE: 94 MMHG | SYSTOLIC BLOOD PRESSURE: 136 MMHG

## 2018-10-04 VITALS — SYSTOLIC BLOOD PRESSURE: 152 MMHG | DIASTOLIC BLOOD PRESSURE: 94 MMHG

## 2018-10-04 VITALS — SYSTOLIC BLOOD PRESSURE: 138 MMHG | DIASTOLIC BLOOD PRESSURE: 93 MMHG

## 2018-10-04 LAB
ALBUMIN SERPL-MCNC: 3.6 G/DL (ref 3.4–5)
ALBUMIN/GLOB SERPL: 0.9 {RATIO} (ref 1–1.7)
ALP SERPL-CCNC: 64 U/L (ref 46–116)
ALT SERPL-CCNC: 31 U/L (ref 16–63)
ANION GAP SERPL CALC-SCNC: 13 MMOL/L (ref 6–14)
AST SERPL-CCNC: 29 U/L (ref 15–37)
BILIRUB SERPL-MCNC: 0.6 MG/DL (ref 0.2–1)
BUN SERPL-MCNC: 14 MG/DL (ref 8–26)
BUN/CREAT SERPL: 14 (ref 6–20)
CALCIUM SERPL-MCNC: 8.2 MG/DL (ref 8.5–10.1)
CHLORIDE SERPL-SCNC: 106 MMOL/L (ref 98–107)
CO2 SERPL-SCNC: 23 MMOL/L (ref 21–32)
CREAT SERPL-MCNC: 1 MG/DL (ref 0.7–1.3)
GFR SERPLBLD BASED ON 1.73 SQ M-ARVRAT: 81.2 ML/MIN
GLOBULIN SER-MCNC: 3.8 G/DL (ref 2.2–3.8)
GLUCOSE SERPL-MCNC: 178 MG/DL (ref 70–99)
POTASSIUM SERPL-SCNC: 3.7 MMOL/L (ref 3.5–5.1)
PROT SERPL-MCNC: 7.4 G/DL (ref 6.4–8.2)
SODIUM SERPL-SCNC: 142 MMOL/L (ref 136–145)

## 2018-10-04 RX ADMIN — HEPARIN SODIUM SCH UNIT: 5000 INJECTION, SOLUTION INTRAVENOUS; SUBCUTANEOUS at 06:01

## 2018-10-04 RX ADMIN — FENOFIBRATE SCH MG: 134 CAPSULE ORAL at 08:13

## 2018-10-04 RX ADMIN — POTASSIUM PHOSPHATE, MONOBASIC SCH MG: 500 TABLET, SOLUBLE ORAL at 08:14

## 2018-10-04 RX ADMIN — BENZONATATE SCH MG: 100 CAPSULE, LIQUID FILLED ORAL at 08:13

## 2018-10-04 RX ADMIN — INSULIN LISPRO SCH UNITS: 100 INJECTION, SOLUTION INTRAVENOUS; SUBCUTANEOUS at 08:19

## 2018-10-04 RX ADMIN — HEPARIN SODIUM SCH UNIT: 5000 INJECTION, SOLUTION INTRAVENOUS; SUBCUTANEOUS at 11:05

## 2018-10-04 RX ADMIN — INSULIN LISPRO SCH UNITS: 100 INJECTION, SOLUTION INTRAVENOUS; SUBCUTANEOUS at 11:05

## 2018-10-04 RX ADMIN — METOPROLOL SUCCINATE SCH MG: 25 TABLET, EXTENDED RELEASE ORAL at 08:13

## 2018-10-04 RX ADMIN — PANTOPRAZOLE SODIUM SCH MG: 40 TABLET, DELAYED RELEASE ORAL at 08:13

## 2018-10-04 NOTE — PDOC
PULMONARY PROGRESS NOTES


Subjective


no soa


Vitals





Vital Signs








  Date Time  Temp Pulse Resp B/P (MAP) Pulse Ox O2 Delivery O2 Flow Rate FiO2


 


10/4/18 11:01 98.6 70 20 136/94 (108) 98 Room Air  





 98.6       








General:  Alert, No acute distress


Lungs:  Clear


Cardiovascular:  S1


Abdomen:  Soft


Neuro Exam:  Alert


Extremities:  No Edema


Skin:  Warm


Labs





Laboratory Tests








Test


 10/2/18


16:56 10/2/18


20:20 10/2/18


20:23 10/2/18


22:30


 


Glucose (Fingerstick)


 164 mg/dL


(70-99) 


 145 mg/dL


(70-99) 





 


Sodium Level


 


 141 mmol/L


(136-145) 


 





 


Potassium Level


 


 3.2 mmol/L


(3.5-5.1) 


 





 


Chloride Level


 


 101 mmol/L


() 


 





 


Carbon Dioxide Level


 


 29 mmol/L


(21-32) 


 





 


Anion Gap  11 (6-14)   


 


Blood Urea Nitrogen


 


 25 mg/dL


(8-26) 


 





 


Creatinine


 


 1.3 mg/dL


(0.7-1.3) 


 





 


Estimated GFR


(Cockcroft-Gault) 


 60.0 


 


 





 


Glucose Level


 


 153 mg/dL


(70-99) 


 





 


Calcium Level


 


 8.8 mg/dL


(8.5-10.1) 


 





 


Magnesium Level


 


 1.0 mg/dL


(1.8-2.4) 


 





 


Urine Opiates Screen    Pos (NEG) 


 


Urine Methadone Screen    Neg (NEG) 


 


Urine Barbiturates    Neg (NEG) 


 


Urine Phencyclidine Screen    Neg (NEG) 


 


Urine


Amphetamine/Methamphetamine 


 


 


 Neg (NEG) 





 


Urine Benzodiazepines Screen    Neg (NEG) 


 


Urine Cocaine Screen    Neg (NEG) 


 


Urine Cannabinoids Screen    Pos (NEG) 


 


Urine Ethyl Alcohol    Neg (NEG) 


 


Test


 10/3/18


04:30 10/3/18


07:21 10/3/18


10:49 10/3/18


16:21


 


White Blood Count


 10.7 x10^3/uL


(4.0-11.0) 


 


 





 


Red Blood Count


 4.25 x10^6/uL


(4.30-5.70) 


 


 





 


Hemoglobin


 14.4 g/dL


(13.0-17.5) 


 


 





 


Hematocrit


 40.5 %


(39.0-53.0) 


 


 





 


Mean Corpuscular Volume 95 fL ()    


 


Mean Corpuscular Hemoglobin 34 pg (25-35)    


 


Mean Corpuscular Hemoglobin


Concent 36 g/dL


(31-37) 


 


 





 


Red Cell Distribution Width


 13.6 %


(11.5-14.5) 


 


 





 


Platelet Count


 334 x10^3/uL


(140-400) 


 


 





 


Neutrophils (%) (Auto) 62 % (31-73)    


 


Lymphocytes (%) (Auto) 29 % (24-48)    


 


Monocytes (%) (Auto) 7 % (0-9)    


 


Eosinophils (%) (Auto) 1 % (0-3)    


 


Basophils (%) (Auto) 1 % (0-3)    


 


Neutrophils # (Auto)


 6.6 x10^3uL


(1.8-7.7) 


 


 





 


Lymphocytes # (Auto)


 3.1 x10^3/uL


(1.0-4.8) 


 


 





 


Monocytes # (Auto)


 0.8 x10^3/uL


(0.0-1.1) 


 


 





 


Eosinophils # (Auto)


 0.1 x10^3/uL


(0.0-0.7) 


 


 





 


Basophils # (Auto)


 0.1 x10^3/uL


(0.0-0.2) 


 


 





 


Sodium Level


 142 mmol/L


(136-145) 


 


 





 


Potassium Level


 3.0 mmol/L


(3.5-5.1) 


 


 





 


Chloride Level


 102 mmol/L


() 


 


 





 


Carbon Dioxide Level


 29 mmol/L


(21-32) 


 


 





 


Anion Gap 11 (6-14)    


 


Blood Urea Nitrogen


 21 mg/dL


(8-26) 


 


 





 


Creatinine


 1.2 mg/dL


(0.7-1.3) 


 


 





 


Estimated GFR


(Cockcroft-Gault) 65.8 


 


 


 





 


BUN/Creatinine Ratio 18 (6-20)    


 


Glucose Level


 173 mg/dL


(70-99) 


 


 





 


Calcium Level


 8.3 mg/dL


(8.5-10.1) 


 


 





 


Phosphorus Level


 3.0 mg/dL


(2.6-4.7) 


 


 





 


Magnesium Level


 2.0 mg/dL


(1.8-2.4) 


 


 





 


Total Bilirubin


 0.8 mg/dL


(0.2-1.0) 


 


 





 


Aspartate Amino Transf


(AST/SGOT) 32 U/L (15-37) 


 


 


 





 


Alanine Aminotransferase


(ALT/SGPT) 28 U/L (16-63) 


 


 


 





 


Alkaline Phosphatase


 61 U/L


() 


 


 





 


Creatine Kinase


 1129 U/L


() 


 


 





 


Total Protein


 7.4 g/dL


(6.4-8.2) 


 


 





 


Albumin


 3.6 g/dL


(3.4-5.0) 


 


 





 


Albumin/Globulin Ratio 0.9 (1.0-1.7)    


 


Lipase


 385 U/L


() 


 


 





 


Glucose (Fingerstick)


 


 144 mg/dL


(70-99) 172 mg/dL


(70-99) 148 mg/dL


(70-99)


 


Test


 10/3/18


20:35 10/4/18


03:25 10/4/18


07:25 10/4/18


11:03


 


Glucose (Fingerstick)


 134 mg/dL


(70-99) 


 158 mg/dL


(70-99) 131 mg/dL


(70-99)


 


Sodium Level


 


 142 mmol/L


(136-145) 


 





 


Potassium Level


 


 3.7 mmol/L


(3.5-5.1) 


 





 


Chloride Level


 


 106 mmol/L


() 


 





 


Carbon Dioxide Level


 


 23 mmol/L


(21-32) 


 





 


Anion Gap  13 (6-14)   


 


Blood Urea Nitrogen


 


 14 mg/dL


(8-26) 


 





 


Creatinine


 


 1.0 mg/dL


(0.7-1.3) 


 





 


Estimated GFR


(Cockcroft-Gault) 


 81.2 


 


 





 


BUN/Creatinine Ratio  14 (6-20)   


 


Glucose Level


 


 178 mg/dL


(70-99) 


 





 


Calcium Level


 


 8.2 mg/dL


(8.5-10.1) 


 





 


Total Bilirubin


 


 0.6 mg/dL


(0.2-1.0) 


 





 


Aspartate Amino Transf


(AST/SGOT) 


 29 U/L (15-37) 


 


 





 


Alanine Aminotransferase


(ALT/SGPT) 


 31 U/L (16-63) 


 


 





 


Alkaline Phosphatase


 


 64 U/L


() 


 





 


Total Protein


 


 7.4 g/dL


(6.4-8.2) 


 





 


Albumin


 


 3.6 g/dL


(3.4-5.0) 


 





 


Albumin/Globulin Ratio  0.9 (1.0-1.7)   








Laboratory Tests








Test


 10/3/18


16:21 10/3/18


20:35 10/4/18


03:25 10/4/18


07:25


 


Glucose (Fingerstick)


 148 mg/dL


(70-99) 134 mg/dL


(70-99) 


 158 mg/dL


(70-99)


 


Sodium Level


 


 


 142 mmol/L


(136-145) 





 


Potassium Level


 


 


 3.7 mmol/L


(3.5-5.1) 





 


Chloride Level


 


 


 106 mmol/L


() 





 


Carbon Dioxide Level


 


 


 23 mmol/L


(21-32) 





 


Anion Gap   13 (6-14)  


 


Blood Urea Nitrogen


 


 


 14 mg/dL


(8-26) 





 


Creatinine


 


 


 1.0 mg/dL


(0.7-1.3) 





 


Estimated GFR


(Cockcroft-Gault) 


 


 81.2 


 





 


BUN/Creatinine Ratio   14 (6-20)  


 


Glucose Level


 


 


 178 mg/dL


(70-99) 





 


Calcium Level


 


 


 8.2 mg/dL


(8.5-10.1) 





 


Total Bilirubin


 


 


 0.6 mg/dL


(0.2-1.0) 





 


Aspartate Amino Transf


(AST/SGOT) 


 


 29 U/L (15-37) 


 





 


Alanine Aminotransferase


(ALT/SGPT) 


 


 31 U/L (16-63) 


 





 


Alkaline Phosphatase


 


 


 64 U/L


() 





 


Total Protein


 


 


 7.4 g/dL


(6.4-8.2) 





 


Albumin


 


 


 3.6 g/dL


(3.4-5.0) 





 


Albumin/Globulin Ratio   0.9 (1.0-1.7)  


 


Test


 10/4/18


11:03 


 


 





 


Glucose (Fingerstick)


 131 mg/dL


(70-99) 


 


 











Medications





Active Scripts








 Medications  Dose


 Route/Sig


 Max Daily Dose Days Date Category


 


 Atorvastatin


 Calcium 20 Mg


 Tablet  1 Tab


 PO HS


   10/2/18 Reported


 


 Losartan


 Potassium 100 Mg


 Tablet  100 Mg


 PO HS


   10/2/18 Reported


 


 Paroxetine Hcl 20


 Mg Tablet  1 Tab


 PO HS


   10/2/18 Reported


 


 Fenofibrate


  (Fenofibrate,Micronized)


 134 Mg Capsule  1 Cap


 PO DAILY


   10/2/18 Reported


 


 Benzonatate 100


 Mg Capsule  1 Cap


 PO BID


   10/2/18 Reported


 


 Metoprolol


 Succinate ( Xl )


  (Metoprolol


 Succinate) 25 Mg


 Tab.er.24h  2 Tab


 PO BID


   10/2/18 Reported


 


 Omeprazole 20 Mg


 Capsule.dr  1 Cap


 PO DAILY


   10/2/18 Reported











Impression


.


1.  Right lower lobe irregular nodules seen on the CT abdomen and pelvis. 


Largest of them 14 mm in size.  This is a patient who is at low risk for lung


malignancy, smoked only for 10 years and no other evidence of any malignancy


outside of lungs.  These nodules need to be followed up.  I will obtain a full


CT chest to have a better view of the entire chest and to look for other


nodules.


2.  Diabetes, under poor control.


3.  Hypokalemia.


4.  Acute kidney injury secondary to dehydration.





Plan


.





1.  CT chest reviewed.  I do not see any additional nodules except 3 nodules in 

RLL/ LLL scarring


2.  followup CT chest in 4 months would be reasonable. Appointment given/ 

likely inflammatory nodules


3.  Management of diabetes and electrolyte imbalance per PCP.











ENID DUKE MD Oct 4, 2018 11:33

## 2018-10-04 NOTE — PDOC
Subjective:


Subjective:


Feels better - tolerating PO w/o n/v, denies abd pain, no diarrhea/stools.


Blurry vision better yesterday, then recurred.


Wants to go home.





Objective:


Vital Signs:





 Vital Signs








  Date Time  Temp Pulse Resp B/P (MAP) Pulse Ox O2 Delivery O2 Flow Rate FiO2


 


10/4/18 08:13  57  138/93    


 


10/4/18 07:15 97.8  18  98 Room Air  





 97.8       








Labs:





Laboratory Tests








Test


 10/3/18


10:49 10/3/18


16:21 10/3/18


20:35 10/4/18


03:25


 


Glucose (Fingerstick) 172 mg/dL  148 mg/dL  134 mg/dL  


 


Sodium Level    142 mmol/L 


 


Potassium Level    3.7 mmol/L 


 


Chloride Level    106 mmol/L 


 


Carbon Dioxide Level    23 mmol/L 


 


Anion Gap    13 


 


Blood Urea Nitrogen    14 mg/dL 


 


Creatinine    1.0 mg/dL 


 


Estimated GFR


(Cockcroft-Gault) 


 


 


 81.2 





 


BUN/Creatinine Ratio    14 


 


Glucose Level    178 mg/dL 


 


Calcium Level    8.2 mg/dL 


 


Total Bilirubin    0.6 mg/dL 


 


Aspartate Amino Transf


(AST/SGOT) 


 


 


 29 U/L 





 


Alanine Aminotransferase


(ALT/SGPT) 


 


 


 31 U/L 





 


Alkaline Phosphatase    64 U/L 


 


Total Protein    7.4 g/dL 


 


Albumin    3.6 g/dL 


 


Albumin/Globulin Ratio    0.9 


 


Test


 10/4/18


07:25 


 


 





 


Glucose (Fingerstick) 158 mg/dL    











PE:





GEN: NAD


LUNGS: CTAB


HEART: RRR


ABD: NABS, S/ND/NT


NEURO/PSYCH: A & O 3





A/P:


N/v, abd pain, diarrhea - resolved


GERD - on PPI, no previous EGD


DM. blurry vision - defer to primary





--


Improved - DC per primary, continue PPI, consider outpt EGD w/ h/o GERD.











JUS BURCH Oct 4, 2018 09:08

## 2018-10-04 NOTE — PDOC
PROGRESS NOTES


Chief Complaint


Chief Complaint


Nausea, vomiting


Hyperglycemia


Abdominal Pain





History of Present Illness


History of Present Illness


Admitted yesterday with N/V, hyperglycemia, abdominal pain and inability to 

hold down food or meds. Found with mild rhabdo and hypokalemia, hypomagnesemia 

as well as lung nodules on imaging. He is feeling somewhat improved today, but 

he now feels dizzy, unsteady on his feet and still cannot hold down food.





A/P:


abd pain, N/V, gasteroenteritis - possibly gastroparesis - will start on 

bethanachol 10mg before meals. GI Consult


TYLER, vasomotor, dehydration - likely from rhabdo - cont fluids


slightly elevated lipase - from gastroenteritis/vomiting, fluids


rhabdomyolysis - will trend CK, cont fluids


hypokalemia - replacement


hypomagnesemia - replacement


generalized weakness - likely from above problems - will get ambulatory. Follow 

phos


dm2, uncontrolled - A1c was 7.3, cannot tolerate oral meds, insulin while in 

house


htn - cont meds


hld - cont meds


GERD - PPI


anxiety - benadryl prn


alcoholism - cutting back


previous smoker - does not wish for patch


small rt lung nodules - seen by pulm


chronic back pain - on meds





talked to son and exwife at bedside.





Vitals


Vitals





Vital Signs








  Date Time  Temp Pulse Resp B/P (MAP) Pulse Ox O2 Delivery O2 Flow Rate FiO2


 


10/4/18 07:15 97.8 57 18 138/93 (108) 98 Room Air  





 97.8       











Physical Exam


General:  Alert, Oriented X3, Cooperative


Heart:  Regular rate, Normal S1, Normal S2


Lungs:  Clear


Abdomen:  Normal bowel sounds, Other (focal epigastric tenderness)


Extremities:  No edema, Normal pulses, No tenderness/swelling


Skin:  No rashes, No breakdown, No significant lesion





Labs


LABS





Laboratory Tests








Test


 10/3/18


10:49 10/3/18


16:21 10/3/18


20:35 10/4/18


03:25


 


Glucose (Fingerstick)


 172 mg/dL


(70-99) 148 mg/dL


(70-99) 134 mg/dL


(70-99) 





 


Sodium Level


 


 


 


 142 mmol/L


(136-145)


 


Potassium Level


 


 


 


 3.7 mmol/L


(3.5-5.1)


 


Chloride Level


 


 


 


 106 mmol/L


()


 


Carbon Dioxide Level


 


 


 


 23 mmol/L


(21-32)


 


Anion Gap    13 (6-14) 


 


Blood Urea Nitrogen


 


 


 


 14 mg/dL


(8-26)


 


Creatinine


 


 


 


 1.0 mg/dL


(0.7-1.3)


 


Estimated GFR


(Cockcroft-Gault) 


 


 


 81.2 





 


BUN/Creatinine Ratio    14 (6-20) 


 


Glucose Level


 


 


 


 178 mg/dL


(70-99)


 


Calcium Level


 


 


 


 8.2 mg/dL


(8.5-10.1)


 


Total Bilirubin


 


 


 


 0.6 mg/dL


(0.2-1.0)


 


Aspartate Amino Transf


(AST/SGOT) 


 


 


 29 U/L (15-37) 





 


Alanine Aminotransferase


(ALT/SGPT) 


 


 


 31 U/L (16-63) 





 


Alkaline Phosphatase


 


 


 


 64 U/L


()


 


Total Protein


 


 


 


 7.4 g/dL


(6.4-8.2)


 


Albumin


 


 


 


 3.6 g/dL


(3.4-5.0)


 


Albumin/Globulin Ratio    0.9 (1.0-1.7) 


 


Test


 10/4/18


07:25 


 


 





 


Glucose (Fingerstick)


 158 mg/dL


(70-99) 


 


 














Assessment and Plan


Assessmemt and Plan


Problems


Medical Problems:


(1) TYLER (acute kidney injury)


Status: Acute  





(2) Hypokalemia


Status: Acute  





(3) Hypomagnesemia


Status: Acute  





(4) Rhabdomyolysis


Status: Acute  





(5) Vomiting


Status: Acute  











Comment


Review of Relevant


I have reviewed the following items abhijit (where applicable) has been applied.


Labs





Laboratory Tests








Test


 10/2/18


09:41 10/2/18


10:20 10/2/18


10:25 10/2/18


16:56


 


Glucose (Fingerstick)


 242 mg/dL


(70-99) 


 


 164 mg/dL


(70-99)


 


Urine Collection Type  Unknown   


 


Urine Color  Shazia   


 


Urine Clarity  Clear   


 


Urine pH  6.0   


 


Urine Specific Gravity  1.025   


 


Urine Protein


 


 100 mg/dL


(NEG-TRACE) 


 





 


Urine Glucose (UA)


 


 100 mg/dL


(NEG) 


 





 


Urine Ketones (Stick)


 


 Negative mg/dL


(NEG) 


 





 


Urine Blood  Negative (NEG)   


 


Urine Nitrite  Negative (NEG)   


 


Urine Bilirubin  Negative (NEG)   


 


Urine Urobilinogen Dipstick


 


 1.0 mg/dL (0.2


mg/dL) 


 





 


Urine Leukocyte Esterase  Negative (NEG)   


 


Urine RBC  0 /HPF (0-2)   


 


Urine WBC


 


 5-10 /HPF


(0-4) 


 





 


Urine Bacteria


 


 Mod /HPF


(0-FEW) 


 





 


White Blood Count


 


 


 17.1 x10^3/uL


(4.0-11.0) 





 


Red Blood Count


 


 


 4.74 x10^6/uL


(4.30-5.70) 





 


Hemoglobin


 


 


 15.9 g/dL


(13.0-17.5) 





 


Hematocrit


 


 


 45.0 %


(39.0-53.0) 





 


Mean Corpuscular Volume   95 fL ()  


 


Mean Corpuscular Hemoglobin   34 pg (25-35)  


 


Mean Corpuscular Hemoglobin


Concent 


 


 35 g/dL


(31-37) 





 


Red Cell Distribution Width


 


 


 13.6 %


(11.5-14.5) 





 


Platelet Count


 


 


 424 x10^3/uL


(140-400) 





 


Neutrophils (%) (Auto)   76 % (31-73)  


 


Lymphocytes (%) (Auto)   15 % (24-48)  


 


Monocytes (%) (Auto)   8 % (0-9)  


 


Eosinophils (%) (Auto)   1 % (0-3)  


 


Basophils (%) (Auto)   0 % (0-3)  


 


Neutrophils # (Auto)


 


 


 13.0 x10^3uL


(1.8-7.7) 





 


Lymphocytes # (Auto)


 


 


 2.6 x10^3/uL


(1.0-4.8) 





 


Monocytes # (Auto)


 


 


 1.3 x10^3/uL


(0.0-1.1) 





 


Eosinophils # (Auto)


 


 


 0.1 x10^3/uL


(0.0-0.7) 





 


Basophils # (Auto)


 


 


 0.1 x10^3/uL


(0.0-0.2) 





 


Segmented Neutrophils %   79 % (35-66)  


 


Lymphocytes %   15 % (24-48)  


 


Monocytes %   2 % (0-10)  


 


Eosinophils %   2 % (0-5)  


 


Basophils %   2 % (0-3)  


 


Platelet Estimate


 


 


 Increased


(ADEQUATE) 





 


Large Platelets   Few  


 


Giant Platelets   Occ  


 


Sodium Level


 


 


 139 mmol/L


(136-145) 





 


Potassium Level


 


 


 2.6 mmol/L


(3.5-5.1) 





 


Chloride Level


 


 


 94 mmol/L


() 





 


Carbon Dioxide Level


 


 


 27 mmol/L


(21-32) 





 


Anion Gap   18 (6-14)  


 


Blood Urea Nitrogen


 


 


 30 mg/dL


(8-26) 





 


Creatinine


 


 


 1.7 mg/dL


(0.7-1.3) 





 


Estimated GFR


(Cockcroft-Gault) 


 


 44.0 


 





 


BUN/Creatinine Ratio   18 (6-20)  


 


Glucose Level


 


 


 239 mg/dL


(70-99) 





 


Hemoglobin A1c


 


 


 7.3 %


(4.8-5.6) 





 


Calcium Level


 


 


 10.0 mg/dL


(8.5-10.1) 





 


Phosphorus Level


 


 


 1.9 mg/dL


(2.6-4.7) 





 


Magnesium Level


 


 


 0.3 mg/dL


(1.8-2.4) 





 


Total Bilirubin


 


 


 0.9 mg/dL


(0.2-1.0) 





 


Aspartate Amino Transf


(AST/SGOT) 


 


 34 U/L (15-37) 


 





 


Alanine Aminotransferase


(ALT/SGPT) 


 


 34 U/L (16-63) 


 





 


Alkaline Phosphatase


 


 


 65 U/L


() 





 


Creatine Kinase


 


 


 1745 U/L


() 





 


Troponin I Quantitative


 


 


 < 0.017 ng/mL


(0.000-0.055) 





 


Total Protein


 


 


 9.4 g/dL


(6.4-8.2) 





 


Albumin


 


 


 4.7 g/dL


(3.4-5.0) 





 


Albumin/Globulin Ratio   1.0 (1.0-1.7)  


 


Lipase


 


 


 428 U/L


() 





 


Test


 10/2/18


20:20 10/2/18


20:23 10/2/18


22:30 10/3/18


04:30


 


Sodium Level


 141 mmol/L


(136-145) 


 


 142 mmol/L


(136-145)


 


Potassium Level


 3.2 mmol/L


(3.5-5.1) 


 


 3.0 mmol/L


(3.5-5.1)


 


Chloride Level


 101 mmol/L


() 


 


 102 mmol/L


()


 


Carbon Dioxide Level


 29 mmol/L


(21-32) 


 


 29 mmol/L


(21-32)


 


Anion Gap 11 (6-14)    11 (6-14) 


 


Blood Urea Nitrogen


 25 mg/dL


(8-26) 


 


 21 mg/dL


(8-26)


 


Creatinine


 1.3 mg/dL


(0.7-1.3) 


 


 1.2 mg/dL


(0.7-1.3)


 


Estimated GFR


(Cockcroft-Gault) 60.0 


 


 


 65.8 





 


Glucose Level


 153 mg/dL


(70-99) 


 


 173 mg/dL


(70-99)


 


Calcium Level


 8.8 mg/dL


(8.5-10.1) 


 


 8.3 mg/dL


(8.5-10.1)


 


Magnesium Level


 1.0 mg/dL


(1.8-2.4) 


 


 2.0 mg/dL


(1.8-2.4)


 


Glucose (Fingerstick)


 


 145 mg/dL


(70-99) 


 





 


Urine Opiates Screen   Pos (NEG)  


 


Urine Methadone Screen   Neg (NEG)  


 


Urine Barbiturates   Neg (NEG)  


 


Urine Phencyclidine Screen   Neg (NEG)  


 


Urine


Amphetamine/Methamphetamine 


 


 Neg (NEG) 


 





 


Urine Benzodiazepines Screen   Neg (NEG)  


 


Urine Cocaine Screen   Neg (NEG)  


 


Urine Cannabinoids Screen   Pos (NEG)  


 


Urine Ethyl Alcohol   Neg (NEG)  


 


White Blood Count


 


 


 


 10.7 x10^3/uL


(4.0-11.0)


 


Red Blood Count


 


 


 


 4.25 x10^6/uL


(4.30-5.70)


 


Hemoglobin


 


 


 


 14.4 g/dL


(13.0-17.5)


 


Hematocrit


 


 


 


 40.5 %


(39.0-53.0)


 


Mean Corpuscular Volume    95 fL () 


 


Mean Corpuscular Hemoglobin    34 pg (25-35) 


 


Mean Corpuscular Hemoglobin


Concent 


 


 


 36 g/dL


(31-37)


 


Red Cell Distribution Width


 


 


 


 13.6 %


(11.5-14.5)


 


Platelet Count


 


 


 


 334 x10^3/uL


(140-400)


 


Neutrophils (%) (Auto)    62 % (31-73) 


 


Lymphocytes (%) (Auto)    29 % (24-48) 


 


Monocytes (%) (Auto)    7 % (0-9) 


 


Eosinophils (%) (Auto)    1 % (0-3) 


 


Basophils (%) (Auto)    1 % (0-3) 


 


Neutrophils # (Auto)


 


 


 


 6.6 x10^3uL


(1.8-7.7)


 


Lymphocytes # (Auto)


 


 


 


 3.1 x10^3/uL


(1.0-4.8)


 


Monocytes # (Auto)


 


 


 


 0.8 x10^3/uL


(0.0-1.1)


 


Eosinophils # (Auto)


 


 


 


 0.1 x10^3/uL


(0.0-0.7)


 


Basophils # (Auto)


 


 


 


 0.1 x10^3/uL


(0.0-0.2)


 


BUN/Creatinine Ratio    18 (6-20) 


 


Phosphorus Level


 


 


 


 3.0 mg/dL


(2.6-4.7)


 


Total Bilirubin


 


 


 


 0.8 mg/dL


(0.2-1.0)


 


Aspartate Amino Transf


(AST/SGOT) 


 


 


 32 U/L (15-37) 





 


Alanine Aminotransferase


(ALT/SGPT) 


 


 


 28 U/L (16-63) 





 


Alkaline Phosphatase


 


 


 


 61 U/L


()


 


Creatine Kinase


 


 


 


 1129 U/L


()


 


Total Protein


 


 


 


 7.4 g/dL


(6.4-8.2)


 


Albumin


 


 


 


 3.6 g/dL


(3.4-5.0)


 


Albumin/Globulin Ratio    0.9 (1.0-1.7) 


 


Lipase


 


 


 


 385 U/L


()


 


Test


 10/3/18


07:21 10/3/18


10:49 10/3/18


16:21 10/3/18


20:35


 


Glucose (Fingerstick)


 144 mg/dL


(70-99) 172 mg/dL


(70-99) 148 mg/dL


(70-99) 134 mg/dL


(70-99)


 


Test


 10/4/18


03:25 10/4/18


07:25 


 





 


Sodium Level


 142 mmol/L


(136-145) 


 


 





 


Potassium Level


 3.7 mmol/L


(3.5-5.1) 


 


 





 


Chloride Level


 106 mmol/L


() 


 


 





 


Carbon Dioxide Level


 23 mmol/L


(21-32) 


 


 





 


Anion Gap 13 (6-14)    


 


Blood Urea Nitrogen


 14 mg/dL


(8-26) 


 


 





 


Creatinine


 1.0 mg/dL


(0.7-1.3) 


 


 





 


Estimated GFR


(Cockcroft-Gault) 81.2 


 


 


 





 


BUN/Creatinine Ratio 14 (6-20)    


 


Glucose Level


 178 mg/dL


(70-99) 


 


 





 


Calcium Level


 8.2 mg/dL


(8.5-10.1) 


 


 





 


Total Bilirubin


 0.6 mg/dL


(0.2-1.0) 


 


 





 


Aspartate Amino Transf


(AST/SGOT) 29 U/L (15-37) 


 


 


 





 


Alanine Aminotransferase


(ALT/SGPT) 31 U/L (16-63) 


 


 


 





 


Alkaline Phosphatase


 64 U/L


() 


 


 





 


Total Protein


 7.4 g/dL


(6.4-8.2) 


 


 





 


Albumin


 3.6 g/dL


(3.4-5.0) 


 


 





 


Albumin/Globulin Ratio 0.9 (1.0-1.7)    


 


Glucose (Fingerstick)


 


 158 mg/dL


(70-99) 


 











Laboratory Tests








Test


 10/3/18


10:49 10/3/18


16:21 10/3/18


20:35 10/4/18


03:25


 


Glucose (Fingerstick)


 172 mg/dL


(70-99) 148 mg/dL


(70-99) 134 mg/dL


(70-99) 





 


Sodium Level


 


 


 


 142 mmol/L


(136-145)


 


Potassium Level


 


 


 


 3.7 mmol/L


(3.5-5.1)


 


Chloride Level


 


 


 


 106 mmol/L


()


 


Carbon Dioxide Level


 


 


 


 23 mmol/L


(21-32)


 


Anion Gap    13 (6-14) 


 


Blood Urea Nitrogen


 


 


 


 14 mg/dL


(8-26)


 


Creatinine


 


 


 


 1.0 mg/dL


(0.7-1.3)


 


Estimated GFR


(Cockcroft-Gault) 


 


 


 81.2 





 


BUN/Creatinine Ratio    14 (6-20) 


 


Glucose Level


 


 


 


 178 mg/dL


(70-99)


 


Calcium Level


 


 


 


 8.2 mg/dL


(8.5-10.1)


 


Total Bilirubin


 


 


 


 0.6 mg/dL


(0.2-1.0)


 


Aspartate Amino Transf


(AST/SGOT) 


 


 


 29 U/L (15-37) 





 


Alanine Aminotransferase


(ALT/SGPT) 


 


 


 31 U/L (16-63) 





 


Alkaline Phosphatase


 


 


 


 64 U/L


()


 


Total Protein


 


 


 


 7.4 g/dL


(6.4-8.2)


 


Albumin


 


 


 


 3.6 g/dL


(3.4-5.0)


 


Albumin/Globulin Ratio    0.9 (1.0-1.7) 


 


Test


 10/4/18


07:25 


 


 





 


Glucose (Fingerstick)


 158 mg/dL


(70-99) 


 


 











Medications





Current Medications


Ondansetron HCl (Zofran) 4 mg 1X  ONCE IV  Last administered on 10/2/18at 10:41

;  Start 10/2/18 at 09:45;  Stop 10/2/18 at 09:55;  Status DC


Morphine Sulfate (Morphine Sulfate) 4 mg 1X  ONCE IV  Last administered on 10/2/

18at 10:41;  Start 10/2/18 at 09:45;  Stop 10/2/18 at 09:55;  Status DC


Sodium Chloride 1,000 ml @  1,000 mls/hr 1X  ONCE IV  Last administered on 10/2/

18at 10:41;  Start 10/2/18 at 10:15;  Stop 10/2/18 at 11:14;  Status DC


Iohexol (Omnipaque 300 Mg/ml) 60 ml 1X  ONCE IV  Last administered on 10/2/18at 

11:30;  Start 10/2/18 at 11:30;  Stop 10/2/18 at 11:31;  Status DC


Sodium Chloride 1,000 ml @  1,000 mls/hr 1X  ONCE IV  Last administered on 10/2/

18at 12:56;  Start 10/2/18 at 11:30;  Stop 10/2/18 at 12:29;  Status DC


Potassium Chloride (Klor-Con) 40 meq 1X  ONCE PO  Last administered on 10/2/

18at 11:43;  Start 10/2/18 at 11:30;  Stop 10/2/18 at 11:31;  Status DC


Info (CONTRAST GIVEN -- Rx MONITORING) 1 each PRN DAILY  PRN MC SEE COMMENTS;  

Start 10/2/18 at 11:30;  Stop 10/4/18 at 11:29


Magnesium Sulfate 50 ml @ 25 mls/hr 1X  ONCE IV  Last administered on 10/2/18at 

12:57;  Start 10/2/18 at 12:45;  Stop 10/2/18 at 14:44;  Status DC


Ondansetron HCl (Zofran) 4 mg PRN Q8HRS  PRN IV NAUSEA/VOMITING Last 

administered on 10/2/18at 15:16;  Start 10/2/18 at 13:00;  Stop 10/3/18 at 12:59

;  Status DC


Fentanyl Citrate (Fentanyl 2ml Vial) 50 mcg PRN Q1HR  PRN IV PAIN;  Start 10/2/

18 at 13:00;  Stop 10/3/18 at 12:59;  Status DC


Acetaminophen (Tylenol) 650 mg PRN Q4HRS  PRN PO FEVER;  Start 10/2/18 at 13:00

;  Stop 10/3/18 at 12:59;  Status DC


Magnesium Sulfate/ Dextrose 100 ml @  25 mls/hr 1X  ONCE IV  Last administered 

on 10/3/18at 02:23;  Start 10/2/18 at 14:30;  Stop 10/2/18 at 18:29;  Status DC


Potassium Chloride/Water 100 ml @  100 mls/hr Q1H IV  Last administered on 10/2/

18at 21:20;  Start 10/2/18 at 15:00;  Stop 10/2/18 at 18:59;  Status DC


Potassium Chloride/Sodium Chloride 1,000 ml @  125 mls/hr Q8H IV  Last 

administered on 10/4/18at 00:44;  Start 10/2/18 at 14:30


Acetaminophen (Tylenol) 650 mg PRN Q6HRS  PRN PO FEVER;  Start 10/2/18 at 14:30


Ondansetron HCl (Zofran) 4 mg PRN Q6HRS  PRN IV NAUSEA/VOMITING Last 

administered on 10/4/18at 00:47;  Start 10/2/18 at 14:30


Morphine Sulfate (Morphine Sulfate) 2 mg PRN Q2HR  PRN IV MODERATE TO SEVERE 

PAIN Last administered on 10/2/18at 15:16;  Start 10/2/18 at 14:30


Tramadol HCl (Ultram) 50 mg PRN Q6HRS  PRN PO MILD TO MODERATE PAIN Last 

administered on 10/3/18at 06:34;  Start 10/2/18 at 14:30


Docusate Sodium (Colace) 100 mg PRN DAILY  PRN PO CONSTIPATION;  Start 10/2/18 

at 14:30


Labetalol HCl (Normodyne Iv Push) 20 mg PRN Q2HR  PRN IVP HYPERTENSION, SEE 

COMMENTS;  Start 10/2/18 at 14:30


Insulin Human Lispro (HumaLOG) 0-9 UNITS TIDWMEALS SQ  Last administered on 10/3

/18at 12:48;  Start 10/2/18 at 17:00


Dextrose (Dextrose 50%-Water Syringe) 12.5 gm PRN Q15MIN  PRN IV SEE COMMENTS;  

Start 10/2/18 at 14:30


Heparin Sodium (Porcine) (Heparin Sq) 5,000 unit Q8HRS SQ  Last administered on 

10/4/18at 06:01;  Start 10/2/18 at 15:00


Acetaminophen/ Hydrocodone Bitart (Lortab 5/325) 1 tab PRN Q4HRS  PRN PO SEVERE 

PAIN Last administered on 10/3/18at 21:37;  Start 10/2/18 at 14:30


Cyclobenzaprine HCl (Flexeril) 10 mg PRN Q6HRS  PRN PO MUSCLE SPASMS Last 

administered on 10/3/18at 21:35;  Start 10/2/18 at 14:30


Diphenhydramine HCl (Benadryl) 25 mg PRN Q6HRS  PRN IVP ITCHING;  Start 10/2/18 

at 14:30


Famotidine (Pepcid Vial) 20 mg QHS IVP ;  Start 10/2/18 at 21:00;  Stop 10/2/18 

at 21:00;  Status DC


Pantoprazole Sodium (Protonix) 40 mg DAILYAC PO ;  Start 10/2/18 at 16:30;  

Stop 10/2/18 at 16:56;  Status DC


Potassium Phosphate 10 mmol/ Dextrose 103.3333 ml @  51.667 m... Q2H IV  Last 

administered on 10/3/18at 00:23;  Start 10/2/18 at 16:00;  Stop 10/2/18 at 19:59

;  Status DC


Atorvastatin Calcium (Lipitor) 20 mg HS PO  Last administered on 10/3/18at 21:13

;  Start 10/2/18 at 21:00


Metoprolol Succinate (Toprol Xl) 50 mg BID PO  Last administered on 10/3/18at 21

:12;  Start 10/2/18 at 21:00


Benzonatate (Tessalon Perle) 100 mg BID PO  Last administered on 10/2/18at 20:29

;  Start 10/2/18 at 21:00


Fenofibrate (Lofibra) 134 mg DAILY PO  Last administered on 10/3/18at 08:28;  

Start 10/3/18 at 09:00


Paroxetine HCl (Paxil) 20 mg HS PO  Last administered on 10/3/18at 21:13;  

Start 10/2/18 at 21:00


Losartan Potassium (Cozaar) 100 mg HS PO  Last administered on 10/3/18at 21:13;

  Start 10/2/18 at 21:00


Pantoprazole Sodium (Protonix) 40 mg DAILYAC PO  Last administered on 10/3/18at 

08:29;  Start 10/3/18 at 07:30


Potassium Chloride (Klor-Con) 40 meq 1X  ONCE PO  Last administered on 10/3/

18at 08:30;  Start 10/3/18 at 07:30;  Stop 10/3/18 at 07:32;  Status DC


Bethanechol Chloride (Urecholine) 10 mg PRN TID  PRN PO nausea;  Start 10/3/18 

at 07:45


Potassium Phosphate (K-Phos Original) 500 mg BID PO  Last administered on 10/3/

18at 21:13;  Start 10/3/18 at 15:00;  Stop 10/4/18 at 14:59





Active Scripts


Active


Reported


Atorvastatin Calcium 20 Mg Tablet 1 Tab PO HS


Losartan Potassium 100 Mg Tablet 100 Mg PO HS


Paroxetine Hcl 20 Mg Tablet 1 Tab PO HS


Fenofibrate (Fenofibrate,Micronized) 134 Mg Capsule 1 Cap PO DAILY


Benzonatate 100 Mg Capsule 1 Cap PO BID


Metoprolol Succinate ( Xl ) (Metoprolol Succinate) 25 Mg Tab.er.24h 2 Tab PO BID


Omeprazole 20 Mg Capsule.dr 1 Cap PO DAILY


Vitals/I & O





Vital Sign - Last 24 Hours








 10/3/18 10/3/18 10/3/18 10/3/18





 08:00 08:31 11:00 15:00


 


Temp   98.9 99.4





   98.9 99.4


 


Pulse  66 74 72


 


Resp   20 20


 


B/P (MAP)  125/81 123/81 (95) 134/89 (104)


 


Pulse Ox   95 96


 


O2 Delivery Room Air  Room Air Room Air


 


    





    





 10/3/18 10/3/18 10/3/18 10/3/18





 19:00 20:00 21:12 21:13


 


Temp 98.6   





 98.6   


 


Pulse 74  74 74


 


Resp 18   


 


B/P (MAP) 143/93 (110)  143/93 143/93


 


Pulse Ox 99   


 


O2 Delivery Room Air Room Air  


 


    





    





 10/3/18 10/3/18 10/4/18 10/4/18





 21:37 22:39 02:47 07:15


 


Temp  98.3 97.5 97.8





  98.3 97.5 97.8


 


Pulse  72 75 57


 


Resp  18 17 18


 


B/P (MAP)  140/108 (119) 152/94 (113) 138/93 (108)


 


Pulse Ox  98 97 98


 


O2 Delivery Room Air Room Air Room Air Room Air














Intake and Output   


 


 10/3/18 10/3/18 10/4/18





 15:00 23:00 07:00


 


Intake Total 600 ml 300 ml 2984 ml


 


Output Total  450 ml 


 


Balance 600 ml -150 ml 2984 ml

















CHRISSY RATLIFF MD Oct 4, 2018 07:57

## 2018-10-16 ENCOUNTER — HOSPITAL ENCOUNTER (OUTPATIENT)
Dept: HOSPITAL 61 - PNCL | Age: 43
Discharge: HOME | End: 2018-10-16
Attending: ANESTHESIOLOGY
Payer: COMMERCIAL

## 2018-10-16 DIAGNOSIS — S29.8XXD: Primary | ICD-10-CM

## 2018-10-16 DIAGNOSIS — I10: ICD-10-CM

## 2018-10-16 DIAGNOSIS — E11.9: ICD-10-CM
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
## 2018-10-16 DIAGNOSIS — M19.90: ICD-10-CM

## 2018-10-16 DIAGNOSIS — M54.89: ICD-10-CM

## 2018-10-16 PROCEDURE — 99214 OFFICE O/P EST MOD 30 MIN: CPT
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
## 2021-01-11 ENCOUNTER — HOSPITAL ENCOUNTER (INPATIENT)
Dept: HOSPITAL 61 - ER | Age: 46
LOS: 2 days | Discharge: HOME | DRG: 637 | End: 2021-01-13
Attending: FAMILY MEDICINE | Admitting: FAMILY MEDICINE
Payer: COMMERCIAL

## 2021-01-11 VITALS — WEIGHT: 235.45 LBS | BODY MASS INDEX: 29.28 KG/M2 | HEIGHT: 75 IN

## 2021-01-11 VITALS — DIASTOLIC BLOOD PRESSURE: 88 MMHG | SYSTOLIC BLOOD PRESSURE: 151 MMHG

## 2021-01-11 DIAGNOSIS — Z91.19: ICD-10-CM

## 2021-01-11 DIAGNOSIS — E78.5: ICD-10-CM

## 2021-01-11 DIAGNOSIS — E11.65: ICD-10-CM

## 2021-01-11 DIAGNOSIS — K57.90: ICD-10-CM

## 2021-01-11 DIAGNOSIS — Z87.891: ICD-10-CM

## 2021-01-11 DIAGNOSIS — E87.6: ICD-10-CM

## 2021-01-11 DIAGNOSIS — K21.9: ICD-10-CM

## 2021-01-11 DIAGNOSIS — R65.11: ICD-10-CM

## 2021-01-11 DIAGNOSIS — E78.00: ICD-10-CM

## 2021-01-11 DIAGNOSIS — K52.9: ICD-10-CM

## 2021-01-11 DIAGNOSIS — I10: ICD-10-CM

## 2021-01-11 DIAGNOSIS — F10.239: ICD-10-CM

## 2021-01-11 DIAGNOSIS — A05.9: ICD-10-CM

## 2021-01-11 DIAGNOSIS — Z82.49: ICD-10-CM

## 2021-01-11 DIAGNOSIS — E11.00: Primary | ICD-10-CM

## 2021-01-11 DIAGNOSIS — F41.9: ICD-10-CM

## 2021-01-11 DIAGNOSIS — N17.0: ICD-10-CM

## 2021-01-11 LAB
% BANDS: 2 % (ref 0–9)
% LYMPHS: 5 % (ref 24–48)
% MONOS: 6 % (ref 0–10)
% SEGS: 87 % (ref 35–66)
ALBUMIN SERPL-MCNC: 4.8 G/DL (ref 3.4–5)
ALBUMIN/GLOB SERPL: 1.2 {RATIO} (ref 1–1.7)
ALP SERPL-CCNC: 73 U/L (ref 46–116)
ALT SERPL-CCNC: 39 U/L (ref 16–63)
AMPHETAMINE/METHAMPHETAMINE: (no result)
ANION GAP SERPL CALC-SCNC: 13 MMOL/L (ref 6–14)
ANION GAP SERPL CALC-SCNC: 18 MMOL/L (ref 6–14)
ANION GAP SERPL CALC-SCNC: 28 MMOL/L (ref 6–14)
APTT PPP: YELLOW S
AST SERPL-CCNC: 19 U/L (ref 15–37)
BACTERIA #/AREA URNS HPF: 0 /HPF
BARBITURATES UR-MCNC: (no result) UG/ML
BASE EXCESS ABG: -4 MMOL/L (ref -3–3)
BASOPHILS # BLD AUTO: 0.1 X10^3/UL (ref 0–0.2)
BASOPHILS NFR BLD: 0 % (ref 0–3)
BENZODIAZ UR-MCNC: (no result) UG/L
BILIRUB SERPL-MCNC: 1 MG/DL (ref 0.2–1)
BILIRUB UR QL STRIP: NEGATIVE
BUN SERPL-MCNC: 21 MG/DL (ref 8–26)
BUN SERPL-MCNC: 25 MG/DL (ref 8–26)
BUN SERPL-MCNC: 26 MG/DL (ref 8–26)
BUN/CREAT SERPL: 12 (ref 6–20)
CALCIUM SERPL-MCNC: 10.4 MG/DL (ref 8.5–10.1)
CALCIUM SERPL-MCNC: 8.2 MG/DL (ref 8.5–10.1)
CALCIUM SERPL-MCNC: 8.3 MG/DL (ref 8.5–10.1)
CANNABINOIDS UR-MCNC: (no result) UG/L
CHLORIDE SERPL-SCNC: 105 MMOL/L (ref 98–107)
CHLORIDE SERPL-SCNC: 107 MMOL/L (ref 98–107)
CHLORIDE SERPL-SCNC: 92 MMOL/L (ref 98–107)
CO2 SERPL-SCNC: 17 MMOL/L (ref 21–32)
CO2 SERPL-SCNC: 21 MMOL/L (ref 21–32)
CO2 SERPL-SCNC: 24 MMOL/L (ref 21–32)
COCAINE UR-MCNC: (no result) NG/ML
CREAT SERPL-MCNC: 1.2 MG/DL (ref 0.7–1.3)
CREAT SERPL-MCNC: 1.7 MG/DL (ref 0.7–1.3)
CREAT SERPL-MCNC: 2.2 MG/DL (ref 0.7–1.3)
EOSINOPHIL NFR BLD: 0 % (ref 0–3)
EOSINOPHIL NFR BLD: 0 X10^3/UL (ref 0–0.7)
ERYTHROCYTE [DISTWIDTH] IN BLOOD BY AUTOMATED COUNT: 13.4 % (ref 11.5–14.5)
FIBRINOGEN PPP-MCNC: CLEAR MG/DL
GFR SERPLBLD BASED ON 1.73 SQ M-ARVRAT: 32.4 ML/MIN
GFR SERPLBLD BASED ON 1.73 SQ M-ARVRAT: 43.6 ML/MIN
GFR SERPLBLD BASED ON 1.73 SQ M-ARVRAT: 65.2 ML/MIN
GLUCOSE SERPL-MCNC: 139 MG/DL (ref 70–99)
GLUCOSE SERPL-MCNC: 224 MG/DL (ref 70–99)
GLUCOSE SERPL-MCNC: 449 MG/DL (ref 70–99)
HCO3 BLDA-SCNC: 16 MMOL/L (ref 21–28)
HCT VFR BLD CALC: 48.7 % (ref 39–53)
HGB BLD-MCNC: 16.7 G/DL (ref 13–17.5)
HYALINE CASTS #/AREA URNS LPF: (no result) /HPF
INSPIRATION SETTING TIME VENT: 21
LYMPHOCYTES # BLD: 1.5 X10^3/UL (ref 1–4.8)
LYMPHOCYTES NFR BLD AUTO: 7 % (ref 24–48)
MAGNESIUM SERPL-MCNC: 0.3 MG/DL (ref 1.8–2.4)
MAGNESIUM SERPL-MCNC: 1.6 MG/DL (ref 1.8–2.4)
MCH RBC QN AUTO: 32 PG (ref 25–35)
MCHC RBC AUTO-ENTMCNC: 34 G/DL (ref 31–37)
MCV RBC AUTO: 93 FL (ref 79–100)
METHADONE SERPL-MCNC: (no result) NG/ML
MONO #: 1.4 X10^3/UL (ref 0–1.1)
MONOCYTES NFR BLD: 7 % (ref 0–9)
NEUT #: 19 X10^3/UL (ref 1.8–7.7)
NEUTROPHILS NFR BLD AUTO: 87 % (ref 31–73)
NITRITE UR QL STRIP: NEGATIVE
OPIATES UR-MCNC: (no result) NG/ML
PCO2 BLDA: 19 MMHG (ref 35–46)
PCP SERPL-MCNC: (no result) MG/DL
PH UR STRIP: 5 [PH]
PHOSPHATE SERPL-MCNC: 2.5 MG/DL (ref 2.6–4.7)
PHOSPHATE SERPL-MCNC: 2.8 MG/DL (ref 2.6–4.7)
PLATELET # BLD AUTO: 412 X10^3/UL (ref 140–400)
PLATELET # BLD EST: (no result) 10*3/UL
PO2 BLDA: 102 MMHG (ref 75–108)
POTASSIUM SERPL-SCNC: 3.1 MMOL/L (ref 3.5–5.1)
POTASSIUM SERPL-SCNC: 3.1 MMOL/L (ref 3.5–5.1)
POTASSIUM SERPL-SCNC: 3.2 MMOL/L (ref 3.5–5.1)
PROT SERPL-MCNC: 8.9 G/DL (ref 6.4–8.2)
PROT UR STRIP-MCNC: 100 MG/DL
RBC # BLD AUTO: 5.26 X10^6/UL (ref 4.3–5.7)
RBC #/AREA URNS HPF: 0 /HPF (ref 0–2)
SAO2 % BLDA: 98 % (ref 92–99)
SODIUM SERPL-SCNC: 137 MMOL/L (ref 136–145)
SODIUM SERPL-SCNC: 144 MMOL/L (ref 136–145)
SODIUM SERPL-SCNC: 144 MMOL/L (ref 136–145)
UROBILINOGEN UR-MCNC: 0.2 MG/DL
WBC # BLD AUTO: 22 X10^3/UL (ref 4–11)
WBC #/AREA URNS HPF: (no result) /HPF (ref 0–4)

## 2021-01-11 PROCEDURE — 87040 BLOOD CULTURE FOR BACTERIA: CPT

## 2021-01-11 PROCEDURE — 96372 THER/PROPH/DIAG INJ SC/IM: CPT

## 2021-01-11 PROCEDURE — 85007 BL SMEAR W/DIFF WBC COUNT: CPT

## 2021-01-11 PROCEDURE — 85025 COMPLETE CBC W/AUTO DIFF WBC: CPT

## 2021-01-11 PROCEDURE — 82805 BLOOD GASES W/O2 SATURATION: CPT

## 2021-01-11 PROCEDURE — 80053 COMPREHEN METABOLIC PANEL: CPT

## 2021-01-11 PROCEDURE — G0378 HOSPITAL OBSERVATION PER HR: HCPCS

## 2021-01-11 PROCEDURE — 80048 BASIC METABOLIC PNL TOTAL CA: CPT

## 2021-01-11 PROCEDURE — 80307 DRUG TEST PRSMV CHEM ANLYZR: CPT

## 2021-01-11 PROCEDURE — 82010 KETONE BODYS QUAN: CPT

## 2021-01-11 PROCEDURE — C9113 INJ PANTOPRAZOLE SODIUM, VIA: HCPCS

## 2021-01-11 PROCEDURE — 96365 THER/PROPH/DIAG IV INF INIT: CPT

## 2021-01-11 PROCEDURE — 96375 TX/PRO/DX INJ NEW DRUG ADDON: CPT

## 2021-01-11 PROCEDURE — 36415 COLL VENOUS BLD VENIPUNCTURE: CPT

## 2021-01-11 PROCEDURE — 83036 HEMOGLOBIN GLYCOSYLATED A1C: CPT

## 2021-01-11 PROCEDURE — 84100 ASSAY OF PHOSPHORUS: CPT

## 2021-01-11 PROCEDURE — 94640 AIRWAY INHALATION TREATMENT: CPT

## 2021-01-11 PROCEDURE — 96361 HYDRATE IV INFUSION ADD-ON: CPT

## 2021-01-11 PROCEDURE — 81001 URINALYSIS AUTO W/SCOPE: CPT

## 2021-01-11 PROCEDURE — 71045 X-RAY EXAM CHEST 1 VIEW: CPT

## 2021-01-11 PROCEDURE — 36600 WITHDRAWAL OF ARTERIAL BLOOD: CPT

## 2021-01-11 PROCEDURE — 83735 ASSAY OF MAGNESIUM: CPT

## 2021-01-11 PROCEDURE — 83690 ASSAY OF LIPASE: CPT

## 2021-01-11 PROCEDURE — 83605 ASSAY OF LACTIC ACID: CPT

## 2021-01-11 PROCEDURE — 99285 EMERGENCY DEPT VISIT HI MDM: CPT

## 2021-01-11 PROCEDURE — 82962 GLUCOSE BLOOD TEST: CPT

## 2021-01-11 RX ADMIN — DEXTROSE AND SODIUM CHLORIDE SCH MLS/HR: 5; .45 INJECTION, SOLUTION INTRAVENOUS at 17:06

## 2021-01-11 RX ADMIN — POTASSIUM CHLORIDE SCH MLS/HR: 7.46 INJECTION, SOLUTION INTRAVENOUS at 18:32

## 2021-01-11 RX ADMIN — POTASSIUM CHLORIDE SCH MLS/HR: 7.46 INJECTION, SOLUTION INTRAVENOUS at 19:41

## 2021-01-11 RX ADMIN — POTASSIUM CHLORIDE SCH MLS/HR: 7.46 INJECTION, SOLUTION INTRAVENOUS at 20:32

## 2021-01-11 RX ADMIN — DEXTROSE AND SODIUM CHLORIDE SCH MLS/HR: 5; .45 INJECTION, SOLUTION INTRAVENOUS at 21:45

## 2021-01-11 RX ADMIN — POTASSIUM CHLORIDE SCH MLS/HR: 7.46 INJECTION, SOLUTION INTRAVENOUS at 16:43

## 2021-01-11 RX ADMIN — ENOXAPARIN SODIUM SCH MG: 40 INJECTION SUBCUTANEOUS at 21:52

## 2021-01-11 RX ADMIN — INSULIN LISPRO SCH UNITS: 100 INJECTION, SOLUTION INTRAVENOUS; SUBCUTANEOUS at 21:39

## 2021-01-11 RX ADMIN — PANTOPRAZOLE SODIUM SCH MG: 40 INJECTION, POWDER, FOR SOLUTION INTRAVENOUS at 17:00

## 2021-01-11 RX ADMIN — DEXTROSE AND SODIUM CHLORIDE SCH MLS/HR: 5; .45 INJECTION, SOLUTION INTRAVENOUS at 01:30

## 2021-01-11 RX ADMIN — Medication SCH MLS/HR: at 16:40

## 2021-01-11 NOTE — PHYS DOC
Past Medical History


Past Medical History:  Anxiety, Diabetes-Type II, GERD, High Cholesterol, 

Hypertension


Past Surgical History:  Other


Additional Past Surgical Histo:  right jaw; right foot; right knee


Smoking Status:  Former Smoker


Alcohol Use:  Occasionally


Drug Use:  Marijuana





General Adult


EDM:


Chief Complaint:  NAUSEA/VOMITING/DIARRHA





HPI:


HPI:





Patient is a 45  year old presents to the emergency department complaining of 

nausea and vomiting after drinking last Saturday night.  Patient states that he 

had some drinks last Saturday night, woke up Danial morning with hot flashes and

started vomiting.  Patient states he vomited approximately 15 times in the last 

24 hours, patient states that he notices water and yellow which vomitus without 

blood.  Patient states he is a type II diabetic although he does not check his 

blood sugars often stating that he checked his blood sugar last month that has 

been fine lately.  Patient denies any abdominal pains, chest pains, shortness of

breath.  Patient denies any increased thirst or increased urination.  Patient 

states he has been in DKA in the past however does not feel like he is in DKA at

this time.





Review of Systems:


Review of Systems:


14 body systems of review of systems have been reviewed.  See HPI for pertinent 

positives and negative responses, otherwise all other systems are negative, 

nonpertinent or noncontributory.





Heart Score:


Risk Factors:


Risk Factors:  DM, Current or recent (<one month) smoker, HTN, HLP, family 

history of CAD, obesity.


Risk Scores:


Score 0 - 3:  2.5% MACE over next 6 weeks - Discharge Home


Score 4 - 6:  20.3% MACE over next 6 weeks - Admit for Clinical Observation


Score 7 - 10:  72.7% MACE over next 6 weeks - Early Invasive Strategies





Allergies:


Allergies:





Allergies








Coded Allergies Type Severity Reaction Last Updated Verified


 


  No Known Drug Allergies    10/2/18 No











Physical Exam:


PE:





Constitutional: Well developed, well nourished, no acute distress, non-toxic 

appearance. 


HENT: Normocephalic, atraumatic, bilateral external ears normal, oropharynx 

moist, no oral exudates, nose normal. 


Eyes: PERRLA, EOMI, conjunctiva normal, no discharge.  


Neck: Normal range of motion, no tenderness, supple, no stridor.  


Cardiovascular:Heart rate regular rhythm, no murmur 


Lungs & Thorax:  Bilateral breath sounds clear to auscultation 


Abdomen: Bowel sounds normal, soft, no tenderness, no masses, no pulsatile 

masses.  


Skin: Warm, dry, no erythema, no rash.  


Back: No tenderness, no CVA tenderness.  


Extremities: No tenderness, no cyanosis, no clubbing, ROM intact, no edema.  


Neurologic: Alert and oriented X 3, normal motor function, normal sensory 

function, no focal deficits noted. 


Psychologic: Affect normal, judgement normal, mood normal.





Current Patient Data:


Vital Signs:





                                   Vital Signs








  Date Time  Temp Pulse Resp B/P (MAP) Pulse Ox O2 Delivery O2 Flow Rate FiO2


 


1/11/21 11:10 98.1 114 22 170/105 (126) 100 Room Air  





 98.1       











EKG:


EKG:


[]





Radiology/Procedures:


Radiology/Procedures:


[]





Course & Med Decision Making:


Course & Med Decision Making


Pertinent Labs and Imaging studies reviewed. (See chart for details)





45-year-old male, vital signs reviewed, physical exam was unremarkable, related 

to patient's stated complaint of nausea and vomiting and type 2 diabetes, and ER

 work-up was initiated.  Pending labs at this time.





Upon reexamination of the patient, the patient reported that his nausea was 

better, serum potassium was equal to 3.2 will order p.o. potassium supplement.  

Patient appears to be in alcohol withdrawal with tremors and both vertical and 

horizontal nystagmus.  Patient states that he is not a drinker, patient states 

his last drink was last Friday.  Patient states that he smokes occasional 

marijuana and does not do any other illicit drugs.





Patient's presentation concerning for alcohol withdrawal syndrome, labs 

concerning for uncontrolled diabetes, discussed case with inpatient management 

Dr. Knowles who agreed to admit patient to the ICU related to his plan to 

start an insulin drip, Dr. Knowles has assumed patient care at this time.  

Bridge orders were completed patient awaiting ICU bed.  Discussed plan with 

patient who is amenable to admission to ICU.





Dragon Disclaimer:


Dragon Disclaimer:


This electronic medical record was generated, in whole or in part, using a voice

 recognition dictation system.





Departure


Departure


Impression:  


   Primary Impression:  


   Alcohol withdrawal syndrome


   Qualified Codes:  F10.239 - Alcohol dependence with withdrawal, unspecified


   Additional Impressions:  


   Uncontrolled diabetes mellitus


   Qualified Codes:  E11.65 - Type 2 diabetes mellitus with hyperglycemia


   Low serum potassium level


Disposition:  09 ADMITTED AS INPT THIS HOSP


Admitting Physician:  LUCHO (DR KNOWLES TO ICU UNIT)


Condition:  GUARDED


Referrals:  


ANHOMI LOWERY (PCP)











DEDE NORRIS       Jan 11, 2021 11:20

## 2021-01-11 NOTE — PDOC1
History and Physical


Date of Admission


Date of Admission


DATE: 1/11/21 


TIME: 13:16





Identification/Chief Complaint


Chief Complaint





seen in er with intractable vomiting, alcohol withdrawal symptoms 45  year old 

presents to the emergency department complaining of nausea and vomiting after 

drinking last Saturday night. 





woke up Sunday morning with hot flashes and started vomiting.  Patient states he

vomited approximately 15 times in the last 24 hours, patient states that he 

notices water and yellow which vomitus without blood.  








glucose elevated at 449   states he is a type II diabetic although he does not 

check his blood sugars often stating that he checked his blood sugar last month 

that has been fine lately





.  Patient denies any abdominal pains, chest pains, shortness of breath.  

Patient denies any increased thirst or increased urination.  Patient states he 

has been in DKA in the past





Past Medical History


Past Medical History:  Anxiety, Diabetes-Type II, GERD, High Cholesterol, 

Hypertension


Past Surgical History:  Other


Additional Past Surgical Histo:  right jaw; right foot; right knee


Smoking Status:  Former Smoker


Alcohol Use:  Occasionally


Drug Use:  Marijuana








PAST MEDICAL HISTORY:  Significant for history of diabetes and hypertension.





PAST SURGICAL HISTORY:  Joint surgery.





FAMILY HISTORY:  Hypertension.





SOCIAL HISTORY:  Smoked for about 10 years and history of alcohol abuse.





ALLERGIES:  None.











fhx copd


Cardiovascular:  HTN


GI:  Gastritis


Heme/Onc:  No pertinent hx


Psych:  Anxiety, Addictions


Endocrine:  Diabetes





Family History


Family History:  Alcohol Abuse, Hypertension





Social History


Smoke:  No


ALCOHOL:  heavy


Drugs:  None





Current Medications


Current Medications





Current Medications


Sodium Chloride 1,000 ml @  1,000 mls/hr 1X  ONCE IV  Last administered on 

1/11/21at 11:29;  Start 1/11/21 at 11:30;  Stop 1/11/21 at 12:29;  Status DC


Ondansetron HCl (Zofran) 4 mg 1X  ONCE IVP  Last administered on 1/11/21at 

11:34;  Start 1/11/21 at 11:30;  Stop 1/11/21 at 11:31;  Status DC


Metoclopramide HCl (Reglan Vial) 10 mg 1X  ONCE IVP ;  Start 1/11/21 at 12:30;  

Stop 1/11/21 at 12:31;  Status DC


Famotidine (Pepcid Vial) 20 mg 1X  ONCE IVP ;  Start 1/11/21 at 12:30;  Stop 

1/11/21 at 12:31;  Status DC


Sodium Chloride 1,000 ml @  1,000 mls/hr 1X  ONCE IV ;  Start 1/11/21 at 12:30; 

Stop 1/11/21 at 13:29


Lorazepam (Ativan Inj) 1 mg 1X  ONCE IVP ;  Start 1/11/21 at 13:30;  Stop 

1/11/21 at 13:31;  Status Cancel


Lorazepam (Ativan Inj) 2 mg 1X  ONCE IVP ;  Start 1/11/21 at 13:00;  Stop 

1/11/21 at 13:09;  Status DC


Thiamine HCl 100 mg/Folic Acid 1 mg/Sodium Chloride 1,001.2 ml  @ 990.198 mls/hr

1X  ONCE IV ;  Start 1/11/21 at 14:00;  Stop 1/11/21 at 15:00





Active Scripts


Active


Reported


Farxiga (Dapagliflozin Propanediol) 10 Mg Tablet 10 Mg PO 


Atorvastatin Calcium 20 Mg Tablet 1 Tab PO HS


Losartan Potassium 100 Mg Tablet 100 Mg PO HS


Paroxetine Hcl 20 Mg Tablet 1 Tab PO HS


Fenofibrate (Fenofibrate,Micronized) 134 Mg Capsule 1 Cap PO DAILY


Benzonatate 100 Mg Capsule 1 Cap PO BID


Metoprolol Succinate ( Xl ) (Metoprolol Succinate) 25 Mg Tab.er.24h 2 Tab PO BID


Omeprazole 20 Mg Capsule.dr 1 Cap PO DAILY





Allergies


Allergies:  


Coded Allergies:  


     No Known Drug Allergies (Unverified , 10/2/18)





ROS


General:  No: Chills, Night Sweats, Fatigue, Malaise, Appetite, Other


PSYCHOLOGICAL ROS:  YES: Anxiety; 


   No: Behavioral Disorder, Concentration difficultie, Decreased libido, 

Depression, Disorientation, Hallucinations, Hostility, Irritablity, Memory 

difficulties, Mood Swings, Obsessive thoughts, Physical abuse, Sexual abuse, 

Sleep disturbances, Suicidal ideation, Other


Eyes:  No Blurry vision, No Decreased vision, No Double vision, No Dry eyes, No 

Excessive tearing, No Eye Pain, No Itchy Eyes, No Loss of vision, No 

Photophobia, No Scotomata, No Uses contacts, No Uses glasses, No Other


HEENT:  No: Heacaches, Visual Changes, Hearing change, Nasal congestion, Nasal 

discharge, Oral lesions, Sinus pain, Sore Throat, Epistaxis, Sneezing, Snoring, 

Tinnitus, Vertigo, Vocal changes, Other


ALLERGY AND IMMUNOLOGY:  No: Hives, Insect Bite Sensitivity, Itchy/Watery Eyes, 

Nasal Congestion, Post Nasal Drip, Seasonal Allergies, Other


Hematological and Lymphatic:  No: Bleeding Problems, Blood Clots, Blood 

Transfusions, Brusing, Night Sweats, Pallor, Swollen Lymph Nodes, Other


ENDOCRINE:  No: Breast Changes, Galactorrhea, Hair Pattern Changes, Hot Flashes,

Malaise/lethargy, Mood Swings, Palpitations, Polydipsia/polyuria, Skin Changes, 

Temperature Intolerance, Unexpected Weight Changes, Other


Gastrointestinal:  Yes Nausea, Yes Vomiting, Yes Abdominal Pain, Yes Diarrhea; 


   No Constipation, No Melena, No Hematochezia, No Other


Musculoskeletal:  No Gait Disturbance, No Joint Pain, No Joint Stiffness, No 

Joint Swelling, No Muscle Pain, No Muscular Weakness, No Pain In:, No Swelling 

In:, No Other


Neurological:  No Behavorial Changes, No Bowel/Bladder ControlChng, No 

Confusion, No Dizziness, No Gait Disturbance, No Headaches, No Impaired 

Coord/balance, No Memory Loss, No Numbness/Tingling, No Seizures, No Speech 

Problems, No Tremors, No Visual Changes, No Weakness, No Other





Physical Exam


Physical Exam





Constitutional: Well developed, well nourished, mild  acute distress, non-toxic 

appearance. 


HENT: Normocephalic, atraumatic, bilateral external ears normal, oropharynx 

moist, no oral exudates, nose normal. 


Eyes: PERRLA, EOMI, conjunctiva normal, no discharge.  


Neck: Normal range of motion, no tenderness, supple, no stridor.  


Cardiovascular:Heart rate regular rhythm, no murmur 


Lungs & Thorax:  Bilateral breath sounds clear to auscultation 


Abdomen: Bowel sounds normal, soft, no tenderness, no masses, no pulsatile 

masses.  


Skin: Warm, dry, no erythema, no rash.  


Back: No tenderness, no CVA tenderness.  


Extremities: No tenderness, no cyanosis, no clubbing, ROM intact, no edema.  


Neurologic: Alert and oriented X 3, normal motor function, normal sensory 

function, no focal deficits noted. 


Psychologic: Affect normal, judgment normal, mood normal.


General:  Alert, Oriented X3, Cooperative, mild distress


HEENT:  Atraumatic, PERRLA, EOMI


Lungs:  Clear to auscultation, Normal air movement


Heart:  RRR, no thrills


Breasts:  Not examined


Abdomen:  Soft


Rectal Exam:  not examined


Extremities:  No cyanosis, No edema


Neuro:  Normal speech, Strength at 5/5 X4 ext, Normal tone, Sensation intact, 

Cranial nerves 3-12 NL


Psych/Mental Status:  Mental status NL, Mood NL





Vitals


Vitals





Vital Signs








  Date Time  Temp Pulse Resp B/P (MAP) Pulse Ox O2 Delivery O2 Flow Rate FiO2


 


1/11/21 11:10 98.1 114 22 170/105 (126) 100 Room Air  





 98.1       











Labs


Labs





Laboratory Tests








Test


 1/11/21


11:25 1/11/21


13:05


 


White Blood Count


 22.0 x10^3/uL


(4.0-11.0) 





 


Red Blood Count


 5.26 x10^6/uL


(4.30-5.70) 





 


Hemoglobin


 16.7 g/dL


(13.0-17.5) 





 


Hematocrit


 48.7 %


(39.0-53.0) 





 


Mean Corpuscular Volume 93 fL ()  


 


Mean Corpuscular Hemoglobin 32 pg (25-35)  


 


Mean Corpuscular Hemoglobin


Concent 34 g/dL


(31-37) 





 


Red Cell Distribution Width


 13.4 %


(11.5-14.5) 





 


Platelet Count


 412 x10^3/uL


(140-400) 





 


Neutrophils (%) (Auto) 87 % (31-73)  


 


Lymphocytes (%) (Auto) 7 % (24-48)  


 


Monocytes (%) (Auto) 7 % (0-9)  


 


Eosinophils (%) (Auto) 0 % (0-3)  


 


Basophils (%) (Auto) 0 % (0-3)  


 


Neutrophils # (Auto)


 19.0 x10^3/uL


(1.8-7.7) 





 


Lymphocytes # (Auto)


 1.5 x10^3/uL


(1.0-4.8) 





 


Monocytes # (Auto)


 1.4 x10^3/uL


(0.0-1.1) 





 


Eosinophils # (Auto)


 0.0 x10^3/uL


(0.0-0.7) 





 


Basophils # (Auto)


 0.1 x10^3/uL


(0.0-0.2) 





 


Segmented Neutrophils % 87 % (35-66)  


 


Band Neutrophils % 2 % (0-9)  


 


Lymphocytes % 5 % (24-48)  


 


Monocytes % 6 % (0-10)  


 


Platelet Estimate


 Increased


(ADEQUATE) 





 


Sodium Level


 137 mmol/L


(136-145) 





 


Potassium Level


 3.2 mmol/L


(3.5-5.1) 





 


Chloride Level


 92 mmol/L


() 





 


Carbon Dioxide Level


 17 mmol/L


(21-32) 





 


Anion Gap 28 (6-14)  


 


Blood Urea Nitrogen


 26 mg/dL


(8-26) 





 


Creatinine


 2.2 mg/dL


(0.7-1.3) 





 


Estimated GFR


(Cockcroft-Gault) 32.4 


 





 


BUN/Creatinine Ratio 12 (6-20)  


 


Glucose Level


 449 mg/dL


(70-99) 





 


Calcium Level


 10.4 mg/dL


(8.5-10.1) 





 


Total Bilirubin


 1.0 mg/dL


(0.2-1.0) 





 


Aspartate Amino Transf


(AST/SGOT) 19 U/L (15-37) 


 





 


Alanine Aminotransferase


(ALT/SGPT) 39 U/L (16-63) 


 





 


Alkaline Phosphatase


 73 U/L


() 





 


Total Protein


 8.9 g/dL


(6.4-8.2) 





 


Albumin


 4.8 g/dL


(3.4-5.0) 





 


Albumin/Globulin Ratio 1.2 (1.0-1.7)  


 


Lipase


 280 U/L


() 





 


Ethyl Alcohol Level


 < 10 mg/dL


(0-10) 





 


Acetone Level Neg (NEG)  


 


O2 Saturation  98 % (92-99) 


 


Arterial Blood pH


 


 7.52


(7.35-7.45)


 


Arterial Blood pCO2 at


Patient Temp 


 19 mmHg


(35-46)


 


Arterial Blood pO2 at Patient


Temp 


 102 mmHg


()


 


Arterial Blood HCO3


 


 16 mmol/L


(21-28)


 


Arterial Blood Base Excess


 


 -4 mmol/L


(-3-3)


 


FiO2  21 








Laboratory Tests








Test


 1/11/21


11:25 1/11/21


13:05


 


White Blood Count


 22.0 x10^3/uL


(4.0-11.0) 





 


Red Blood Count


 5.26 x10^6/uL


(4.30-5.70) 





 


Hemoglobin


 16.7 g/dL


(13.0-17.5) 





 


Hematocrit


 48.7 %


(39.0-53.0) 





 


Mean Corpuscular Volume 93 fL ()  


 


Mean Corpuscular Hemoglobin 32 pg (25-35)  


 


Mean Corpuscular Hemoglobin


Concent 34 g/dL


(31-37) 





 


Red Cell Distribution Width


 13.4 %


(11.5-14.5) 





 


Platelet Count


 412 x10^3/uL


(140-400) 





 


Neutrophils (%) (Auto) 87 % (31-73)  


 


Lymphocytes (%) (Auto) 7 % (24-48)  


 


Monocytes (%) (Auto) 7 % (0-9)  


 


Eosinophils (%) (Auto) 0 % (0-3)  


 


Basophils (%) (Auto) 0 % (0-3)  


 


Neutrophils # (Auto)


 19.0 x10^3/uL


(1.8-7.7) 





 


Lymphocytes # (Auto)


 1.5 x10^3/uL


(1.0-4.8) 





 


Monocytes # (Auto)


 1.4 x10^3/uL


(0.0-1.1) 





 


Eosinophils # (Auto)


 0.0 x10^3/uL


(0.0-0.7) 





 


Basophils # (Auto)


 0.1 x10^3/uL


(0.0-0.2) 





 


Segmented Neutrophils % 87 % (35-66)  


 


Band Neutrophils % 2 % (0-9)  


 


Lymphocytes % 5 % (24-48)  


 


Monocytes % 6 % (0-10)  


 


Platelet Estimate


 Increased


(ADEQUATE) 





 


Sodium Level


 137 mmol/L


(136-145) 





 


Potassium Level


 3.2 mmol/L


(3.5-5.1) 





 


Chloride Level


 92 mmol/L


() 





 


Carbon Dioxide Level


 17 mmol/L


(21-32) 





 


Anion Gap 28 (6-14)  


 


Blood Urea Nitrogen


 26 mg/dL


(8-26) 





 


Creatinine


 2.2 mg/dL


(0.7-1.3) 





 


Estimated GFR


(Cockcroft-Gault) 32.4 


 





 


BUN/Creatinine Ratio 12 (6-20)  


 


Glucose Level


 449 mg/dL


(70-99) 





 


Calcium Level


 10.4 mg/dL


(8.5-10.1) 





 


Total Bilirubin


 1.0 mg/dL


(0.2-1.0) 





 


Aspartate Amino Transf


(AST/SGOT) 19 U/L (15-37) 


 





 


Alanine Aminotransferase


(ALT/SGPT) 39 U/L (16-63) 


 





 


Alkaline Phosphatase


 73 U/L


() 





 


Total Protein


 8.9 g/dL


(6.4-8.2) 





 


Albumin


 4.8 g/dL


(3.4-5.0) 





 


Albumin/Globulin Ratio 1.2 (1.0-1.7)  


 


Lipase


 280 U/L


() 





 


Ethyl Alcohol Level


 < 10 mg/dL


(0-10) 





 


Acetone Level Neg (NEG)  


 


O2 Saturation  98 % (92-99) 


 


Arterial Blood pH


 


 7.52


(7.35-7.45)


 


Arterial Blood pCO2 at


Patient Temp 


 19 mmHg


(35-46)


 


Arterial Blood pO2 at Patient


Temp 


 102 mmHg


()


 


Arterial Blood HCO3


 


 16 mmol/L


(21-28)


 


Arterial Blood Base Excess


 


 -4 mmol/L


(-3-3)


 


FiO2  21 











VTE Prophylaxis Ordered


VTE Prophylaxis Devices:  Yes


VTE Pharmacological Prophylaxi:  Yes





Assessment/Plan


Assessment/Plan


impression =======================





1. HX ALCOHOL ABUSE WITH Acute withdrawal symptoms


2. uncontrolled diabetes, nonketotic hyperosmolar glycemia


3. hypokalemia











plan=============








ADMIT


NPO


IV FLUIDS


CIWA PROTOCOL


INSULIN DRIP


THIAMINE


FOLIC ACID


Alcohol cessation discussed


DVT PROPHYLAXIS








D/W ER 





Justifications for Admission


Other Justification














MJ KNOWLES MD          Jan 11, 2021 13:18

## 2021-01-11 NOTE — NUR
The patient, RORY MONTEZ, 46 y/o, M admitted by MJ KNOWLES MD, was given written 
information regarding hospital policies, unit procedures and contact persons.  



Valuables were checked and noted. patient denies any needs at this time. patient is 
currently laying in bed with eyes closed at this time. call light is within reach of patient 
and bed alarm is active. This RN will continue to monitor the patient at this time.

## 2021-01-11 NOTE — RAD
EXAM: CHEST 1 VIEW 



History: Hypergammaglobulinemia 



COMPARISON: 10/2/2018



TECHNIQUE: Single portable radiograph of the chest



FINDINGS:  The cardiac silhouette is unremarkable. The lungs are clear bilaterally. The costophrenic 
sulci are clear and well demarcated.



IMPRESSION:  No radiographic evidence of an acute cardiopulmonary process.

 



Electronically signed by: Adrián Olivo MD (1/11/2021 1:22 PM) [id number]

## 2021-01-11 NOTE — PDOC2
GI CONSULT


Date of Service:


DATE: 1/11/21 


TIME: 16:15





Reason For Consult:


alcohol abuse





HPI:


HPI:


46 y/o male seen in ER w/ Dr. Ervin.


Ill since yesterday morning w/ chills, sweats, and vomiting.  Can't keep any 

liquids or pills down.


Might have had some mild lower abdominal discomfort that resolved.  


H/o GERD on OTC Prilosec QD.  No dysphagia/odynophagia.  No diarrhea, 

constipation, hematemesis, hematochezia, or melena.


No previous scopes.  No GB, liver, pancreas, or PUD history.


Diverticulosis on past imaging.  Had barely elevated lipase when we saw in 2018 

- unclear significance.


A1c was 5.3 last week. 


We saw him in 2018 for n/v, abd pain, and diarrhea - suspected viral illness 

though had been off DM treatment x 1 month at that time.





PMH:


PMH:


HTN, pulm nodules, HLD, DM, allergic rhinitis, DDD, right jaw/foot/knee 

surgeries





FH:


Family History:  No pertinent hx





Social History:


Smoke:  Quit


ALCOHOL:  other (drinks some - sometimes a little, sometimes more)


Drugs:  Marijuana (tox positive)





ROS:





GEN: +chills, sweats


HEENT: Denies blurred vision, sore throat


CV: Denies chest pain


RESP: Denies shortness of air, cough


GI: Per HPI


: Denies hematuria, dysuria


ENDO: Denies weight changes


NEURO: Denies confusion, dizziness


MSK: Denies weakness, joint pain/swelling


SKIN: Denies jaundice, pruritus





Vitals:


Vitals:





                                   Vital Signs








  Date Time  Temp Pulse Resp B/P (MAP) Pulse Ox O2 Delivery O2 Flow Rate FiO2


 


1/11/21 14:57  126 18 157/82 (107) 97 Room Air  


 


1/11/21 11:10 98.1       





 98.1       











Labs:


Labs:





Laboratory Tests








Test


 1/11/21


11:25 1/11/21


13:05 1/11/21


13:09 1/11/21


13:14


 


White Blood Count


 22.0 x10^3/uL


(4.0-11.0) 


 


 





 


Red Blood Count


 5.26 x10^6/uL


(4.30-5.70) 


 


 





 


Hemoglobin


 16.7 g/dL


(13.0-17.5) 


 


 





 


Hematocrit


 48.7 %


(39.0-53.0) 


 


 





 


Mean Corpuscular Volume 93 fL ()    


 


Mean Corpuscular Hemoglobin 32 pg (25-35)    


 


Mean Corpuscular Hemoglobin


Concent 34 g/dL


(31-37) 


 


 





 


Red Cell Distribution Width


 13.4 %


(11.5-14.5) 


 


 





 


Platelet Count


 412 x10^3/uL


(140-400) 


 


 





 


Neutrophils (%) (Auto) 87 % (31-73)    


 


Lymphocytes (%) (Auto) 7 % (24-48)    


 


Monocytes (%) (Auto) 7 % (0-9)    


 


Eosinophils (%) (Auto) 0 % (0-3)    


 


Basophils (%) (Auto) 0 % (0-3)    


 


Neutrophils # (Auto)


 19.0 x10^3/uL


(1.8-7.7) 


 


 





 


Lymphocytes # (Auto)


 1.5 x10^3/uL


(1.0-4.8) 


 


 





 


Monocytes # (Auto)


 1.4 x10^3/uL


(0.0-1.1) 


 


 





 


Eosinophils # (Auto)


 0.0 x10^3/uL


(0.0-0.7) 


 


 





 


Basophils # (Auto)


 0.1 x10^3/uL


(0.0-0.2) 


 


 





 


Segmented Neutrophils % 87 % (35-66)    


 


Band Neutrophils % 2 % (0-9)    


 


Lymphocytes % 5 % (24-48)    


 


Monocytes % 6 % (0-10)    


 


Platelet Estimate


 Increased


(ADEQUATE) 


 


 





 


Sodium Level


 137 mmol/L


(136-145) 


 


 





 


Potassium Level


 3.2 mmol/L


(3.5-5.1) 


 


 





 


Chloride Level


 92 mmol/L


() 


 


 





 


Carbon Dioxide Level


 17 mmol/L


(21-32) 


 


 





 


Anion Gap 28 (6-14)    


 


Blood Urea Nitrogen


 26 mg/dL


(8-26) 


 


 





 


Creatinine


 2.2 mg/dL


(0.7-1.3) 


 


 





 


Estimated GFR


(Cockcroft-Gault) 32.4 


 


 


 





 


BUN/Creatinine Ratio 12 (6-20)    


 


Glucose Level


 449 mg/dL


(70-99) 


 


 





 


Calcium Level


 10.4 mg/dL


(8.5-10.1) 


 


 





 


Phosphorus Level


 3.5 mg/dL


(2.6-4.7) 


 


 





 


Magnesium Level


 0.3 mg/dL


(1.8-2.4) 


 


 





 


Total Bilirubin


 1.0 mg/dL


(0.2-1.0) 


 


 





 


Aspartate Amino Transf


(AST/SGOT) 19 U/L (15-37) 


 


 


 





 


Alanine Aminotransferase


(ALT/SGPT) 39 U/L (16-63) 


 


 


 





 


Alkaline Phosphatase


 73 U/L


() 


 


 





 


Total Protein


 8.9 g/dL


(6.4-8.2) 


 


 





 


Albumin


 4.8 g/dL


(3.4-5.0) 


 


 





 


Albumin/Globulin Ratio 1.2 (1.0-1.7)    


 


Lipase


 280 U/L


() 


 


 





 


Ethyl Alcohol Level


 < 10 mg/dL


(0-10) 


 


 





 


Acetone Level Neg (NEG)    


 


O2 Saturation  98 % (92-99)   


 


Arterial Blood pH


 


 7.52


(7.35-7.45) 


 





 


Arterial Blood pCO2 at


Patient Temp 


 19 mmHg


(35-46) 


 





 


Arterial Blood pO2 at Patient


Temp 


 102 mmHg


() 


 





 


Arterial Blood HCO3


 


 16 mmol/L


(21-28) 


 





 


Arterial Blood Base Excess


 


 -4 mmol/L


(-3-3) 


 





 


FiO2  21   


 


Urine Collection Type   Unknown  


 


Urine Color   Yellow  


 


Urine Clarity   Clear  


 


Urine pH   5.0 (<5.0-8.0)  


 


Urine Specific Gravity


 


 


 >=1.030


(1.000-1.030) 





 


Urine Protein


 


 


 100 mg/dL


(NEG-TRACE) 





 


Urine Glucose (UA)


 


 


 >=1000 mg/dL


(NEG) 





 


Urine Ketones (Stick)   15 mg/dL (NEG)  


 


Urine Blood   Trace (NEG)  


 


Urine Nitrite   Negative (NEG)  


 


Urine Bilirubin   Negative (NEG)  


 


Urine Urobilinogen Dipstick


 


 


 0.2 mg/dL (0.2


mg/dL) 





 


Urine Leukocyte Esterase   Negative (NEG)  


 


Urine RBC   0 /HPF (0-2)  


 


Urine WBC   Occ /HPF (0-4)  


 


Urine Squamous Epithelial


Cells 


 


 Occ /LPF 


 





 


Urine Bacteria   0 /HPF (0-FEW)  


 


Urine Hyaline Casts   Moderate /HPF  


 


Urine Mucus   Slight /LPF  


 


Urine Opiates Screen   Neg (NEG)  


 


Urine Methadone Screen   Neg (NEG)  


 


Urine Barbiturates   Neg (NEG)  


 


Urine Phencyclidine Screen   Neg (NEG)  


 


Urine


Amphetamine/Methamphetamine 


 


 Neg (NEG) 


 





 


Urine Benzodiazepines Screen   Neg (NEG)  


 


Urine Cocaine Screen   Neg (NEG)  


 


Urine Cannabinoids Screen   Pos (NEG)  


 


Urine Ethyl Alcohol   Neg (NEG)  


 


Lactic Acid Level


 


 


 


 6.6 mmol/L


(0.4-2.0)


 


Test


 1/11/21


13:39 


 


 





 


Glucose (Fingerstick)


 357 mg/dL


(70-99) 


 


 














Allergies:


Coded Allergies:  


     No Known Drug Allergies (Unverified , 10/2/18)





Medications:





Current Medications








 Medications


  (Trade)  Dose


 Ordered  Sig/Nadege


 Route


 PRN Reason  Start Time


 Stop Time Status Last Admin


Dose Admin


 


 Sodium Chloride  1,000 ml @ 


 1,000 mls/hr  1X  ONCE


 IV


   1/11/21 11:30


 1/11/21 12:29 DC 1/11/21 11:29





 


 Ondansetron HCl


  (Zofran)  4 mg  1X  ONCE


 IVP


   1/11/21 11:30


 1/11/21 11:31 DC 1/11/21 11:34





 


 Metoclopramide HCl


  (Reglan Vial)  10 mg  1X  ONCE


 IVP


   1/11/21 12:30


 1/11/21 12:31 DC 1/11/21 13:20





 


 Famotidine


  (Pepcid Vial)  20 mg  1X  ONCE


 IVP


   1/11/21 12:30


 1/11/21 12:31 DC 1/11/21 13:21





 


 Sodium Chloride  1,000 ml @ 


 1,000 mls/hr  1X  ONCE


 IV


   1/11/21 12:30


 1/11/21 13:29 DC 1/11/21 13:19





 


 Lorazepam


  (Ativan Inj)  2 mg  1X  ONCE


 IVP


   1/11/21 13:00


 1/11/21 13:09 DC 1/11/21 13:18





 


 Thiamine HCl 100


 mg/Folic Acid 1


 mg/Sodium Chloride  1,001.2 ml


  @ 990.198


 mls/hr  1X  ONCE


 IV


   1/11/21 14:00


 1/11/21 15:00 DC 1/11/21 13:47





 


 Insulin Human


 Lispro


  (HumaLOG)  10 units  1X  ONCE


 SQ


   1/11/21 13:45


 1/11/21 13:46 DC 1/11/21 13:40





 


 Lorazepam


  (Ativan Inj)  2 mg  PRN Q1HR  PRN


 IV


 For CIWA 8-14  1/11/21 15:00


    1/11/21 15:43





 


 Sodium Chloride  1,000 ml @ 


 250 mls/hr  Q4H


 IV


   1/11/21 15:00


    1/11/21 15:41





 


 Insulin Human


 Regular 100 unit/


 Sodium Chloride  101 ml @ 0


 mls/hr  CONT PRN  PRN


 IV


 PER PROTOCOL  1/11/21 15:00


    1/11/21 15:42














Imaging:


Imaging:


CXR 1/11


IMPRESSION:  No radiographic evidence of an acute cardiopulmonary process.





PE:





GEN: NAD


HEENT: Atraumatic, PERRL


LUNGS: CTAB


HEART: tachycardic


ABD: NABS, S/ND/NT


EXTREMITY: No edema


SKIN: No rashes, no jaundice


NEURO/PSYCH: A & O 3





A/P:


A/P:


Chills/sweats, vomiting


DKA, TYLER, HTN - per Dr. Cormier


GERD - controlled w/ PPI, no previous EGD


CRC screen - none; average risk


Diverticulosis


+cannabinoids





--


Vomiting likely related to DKA.  Treat this and observe from GI standpoint.


Give IV acid-reducer for now - change to PO as able.


Not sure re: "alcohol abuse" - LFTs and plt WNL, normal liver on past imaging.











JUS BURCH         Jan 11, 2021 16:25 negative...

## 2021-01-12 VITALS — SYSTOLIC BLOOD PRESSURE: 167 MMHG | DIASTOLIC BLOOD PRESSURE: 93 MMHG

## 2021-01-12 VITALS — SYSTOLIC BLOOD PRESSURE: 144 MMHG | DIASTOLIC BLOOD PRESSURE: 89 MMHG

## 2021-01-12 VITALS — DIASTOLIC BLOOD PRESSURE: 84 MMHG | SYSTOLIC BLOOD PRESSURE: 167 MMHG

## 2021-01-12 VITALS — DIASTOLIC BLOOD PRESSURE: 91 MMHG | SYSTOLIC BLOOD PRESSURE: 155 MMHG

## 2021-01-12 VITALS — SYSTOLIC BLOOD PRESSURE: 160 MMHG | DIASTOLIC BLOOD PRESSURE: 103 MMHG

## 2021-01-12 VITALS — DIASTOLIC BLOOD PRESSURE: 86 MMHG | SYSTOLIC BLOOD PRESSURE: 133 MMHG

## 2021-01-12 LAB
ANION GAP SERPL CALC-SCNC: 14 MMOL/L (ref 6–14)
BASOPHILS # BLD AUTO: 0 X10^3/UL (ref 0–0.2)
BASOPHILS NFR BLD: 0 % (ref 0–3)
BUN SERPL-MCNC: 13 MG/DL (ref 8–26)
CALCIUM SERPL-MCNC: 7.9 MG/DL (ref 8.5–10.1)
CHLORIDE SERPL-SCNC: 106 MMOL/L (ref 98–107)
CO2 SERPL-SCNC: 22 MMOL/L (ref 21–32)
CREAT SERPL-MCNC: 1.1 MG/DL (ref 0.7–1.3)
EOSINOPHIL NFR BLD: 0 % (ref 0–3)
EOSINOPHIL NFR BLD: 0.1 X10^3/UL (ref 0–0.7)
ERYTHROCYTE [DISTWIDTH] IN BLOOD BY AUTOMATED COUNT: 13.2 % (ref 11.5–14.5)
GFR SERPLBLD BASED ON 1.73 SQ M-ARVRAT: 72.1 ML/MIN
GLUCOSE SERPL-MCNC: 252 MG/DL (ref 70–99)
HBA1C MFR BLD: 7 % (ref 4.8–5.6)
HCT VFR BLD CALC: 39.8 % (ref 39–53)
HGB BLD-MCNC: 13.5 G/DL (ref 13–17.5)
LYMPHOCYTES # BLD: 1.6 X10^3/UL (ref 1–4.8)
LYMPHOCYTES NFR BLD AUTO: 10 % (ref 24–48)
MCH RBC QN AUTO: 32 PG (ref 25–35)
MCHC RBC AUTO-ENTMCNC: 34 G/DL (ref 31–37)
MCV RBC AUTO: 94 FL (ref 79–100)
MONO #: 1.2 X10^3/UL (ref 0–1.1)
MONOCYTES NFR BLD: 8 % (ref 0–9)
NEUT #: 13.2 X10^3/UL (ref 1.8–7.7)
NEUTROPHILS NFR BLD AUTO: 82 % (ref 31–73)
PLATELET # BLD AUTO: 300 X10^3/UL (ref 140–400)
POTASSIUM SERPL-SCNC: 3.2 MMOL/L (ref 3.5–5.1)
RBC # BLD AUTO: 4.24 X10^6/UL (ref 4.3–5.7)
SODIUM SERPL-SCNC: 142 MMOL/L (ref 136–145)
WBC # BLD AUTO: 16.1 X10^3/UL (ref 4–11)

## 2021-01-12 RX ADMIN — DEXTROSE AND SODIUM CHLORIDE SCH MLS/HR: 5; .45 INJECTION, SOLUTION INTRAVENOUS at 23:00

## 2021-01-12 RX ADMIN — DEXTROSE AND SODIUM CHLORIDE SCH MLS/HR: 5; .45 INJECTION, SOLUTION INTRAVENOUS at 08:19

## 2021-01-12 RX ADMIN — INSULIN LISPRO SCH UNITS: 100 INJECTION, SOLUTION INTRAVENOUS; SUBCUTANEOUS at 08:29

## 2021-01-12 RX ADMIN — DEXTROSE AND SODIUM CHLORIDE SCH MLS/HR: 5; .45 INJECTION, SOLUTION INTRAVENOUS at 22:25

## 2021-01-12 RX ADMIN — THIAMINE HYDROCHLORIDE SCH MLS/HR: 100 INJECTION, SOLUTION INTRAMUSCULAR; INTRAVENOUS at 08:22

## 2021-01-12 RX ADMIN — ENOXAPARIN SODIUM SCH MG: 40 INJECTION SUBCUTANEOUS at 19:57

## 2021-01-12 RX ADMIN — DEXTROSE AND SODIUM CHLORIDE SCH MLS/HR: 5; .45 INJECTION, SOLUTION INTRAVENOUS at 02:42

## 2021-01-12 RX ADMIN — DEXTROSE AND SODIUM CHLORIDE SCH MLS/HR: 5; .45 INJECTION, SOLUTION INTRAVENOUS at 15:00

## 2021-01-12 RX ADMIN — DEXTROSE AND SODIUM CHLORIDE SCH MLS/HR: 5; .45 INJECTION, SOLUTION INTRAVENOUS at 11:00

## 2021-01-12 RX ADMIN — Medication SCH MLS/HR: at 08:25

## 2021-01-12 RX ADMIN — PANTOPRAZOLE SODIUM SCH MG: 40 INJECTION, POWDER, FOR SOLUTION INTRAVENOUS at 08:20

## 2021-01-12 RX ADMIN — INSULIN LISPRO SCH UNITS: 100 INJECTION, SOLUTION INTRAVENOUS; SUBCUTANEOUS at 12:29

## 2021-01-12 RX ADMIN — INSULIN LISPRO SCH UNITS: 100 INJECTION, SOLUTION INTRAVENOUS; SUBCUTANEOUS at 17:10

## 2021-01-12 NOTE — NUR
SW following for discharge planning. Spoke with RN and reviewed chart. Pt currently room 
air, ADA diet, IV potassium, COVID pending. PAT referral made to [facility] per hx of ETOH. Spoke 
with Na from [facility] who assessed pt today, 1/12. Pt declined the need for out-patient 
resources for substance abuse and reported he does not feel his drinking is of concern. 
Discharge plan is home, self-care when stable. SW available as needed.

## 2021-01-12 NOTE — PDOC
Date of Service:


DATE: 1/12/21 


TIME: 10:30





Subjective:


Subjective:


Much better today.


Tolerating diet, no vomiting, maybe some LLQ pressure but not really sure.


Wants to know why this happened because the same thing happened a few years ago.


Recently changed DM med due to insurance/cost - restarted metformin in place of 

another.


Interested in 'scopes as outpt.


Wants to be restarted on home meds.


Uses marijuana for pain.





Objective:


Vital Signs:





                                   Vital Signs








  Date Time  Temp Pulse Resp B/P (MAP) Pulse Ox O2 Delivery O2 Flow Rate FiO2


 


1/12/21 07:45 97.0 105 18 167/84 (111) 95 Room Air  





 97.0       








Labs:





Laboratory Tests








Test


 1/11/21


13:39 1/11/21


16:32 1/11/21


17:39 1/11/21


18:45


 


Glucose (Fingerstick)


 357 mg/dL


(70-99) 180 mg/dL


(70-99) 202 mg/dL


(70-99) 200 mg/dL


(70-99)


 


Test


 1/11/21


19:45 1/11/21


21:01 1/11/21


22:13 1/12/21


07:03


 


Glucose (Fingerstick)


 154 mg/dL


(70-99) 138 mg/dL


(70-99) 103 mg/dL


(70-99) 198 mg/dL


(70-99)











PE:





GEN: NAD - looks better today, less shaky


LUNGS: clear anteriorly


HEART: tachycardic


ABD: quiet BS, soft, non-tender (did not appreciated any LLQ discomfort)


NEURO/PSYCH: A & O 3





A/P:


Chills/sweats, vomiting - resolved


DM/hyperglycemia, hypokalemia - per Dr. Cormier


GERD - on PPI at home


+cannabinoids





--


N/v possibly multi-factorial - ?virus + DM + GERD... better today.


Change to PO PPI.  Consider outpt EGD.


Home meds per Dr. Cormier.





Justicifation of Admission Dx:


Justifications for Admission:


Justification of Admission Dx:  Yes











JUS BURCH         Jan 12, 2021 10:40

## 2021-01-12 NOTE — PDOC
PROGRESS NOTES


Date of Service:


DATE: 1/12/21 


TIME: 11:50





Chief Complaint


Chief Complaint





VTE Prophylaxis Ordered


VTE Prophylaxis Devices:  Yes


VTE Pharmacological Prophylaxi:  Yes





Assessment/Plan


Assessment/Plan


impression =======================





1. HX ALCOHOL ABUSE WITH Acute withdrawal symptoms


2. uncontrolled diabetes, nonketotic hyperosmolar glycemia


3. hypokalemia


4. NONCOMPLIANCE 


5. HYPOKALEMIA ON REPLACEMENT RC











plan=============








ADMIT


NPO


IV FLUIDS


CIWA PROTOCOL


INSULIN DRIP


THIAMINE


FOLIC ACID


Alcohol cessation discussed


DVT PROPHYLAXIS


Merits outpatient EGD.








D/W RN





History of Present Illness


History of Present Illness





Identification/Chief Complaint


Chief Complaint





seen in er with intractable vomiting, alcohol withdrawal symptoms 45  year old 

presents to the emergency department complaining of nausea and vomiting after 

drinking last Saturday night. 





woke up Sunday morning with hot flashes and started vomiting.  Patient states he

vomited approximately 15 times in the last 24 hours, patient states that he 

notices water and yellow which vomitus without blood.  








glucose elevated at 449   states he is a type II diabetic although he does not 

check his blood sugars often stating that he checked his blood sugar last month 

that has been fine lately





.  Patient denies any abdominal pains, chest pains, shortness of breath.  

Patient denies any increased thirst or increased urination.  Patient states he 

has been in DKA in the past





Past Medical History


Past Medical History:  Anxiety, Diabetes-Type II, GERD, High Cholesterol, Hyper

tension


Past Surgical History:  Other


Additional Past Surgical Histo:  right jaw; right foot; right knee


Smoking Status:  Former Smoker


Alcohol Use:  Occasionally


Drug Use:  Marijuana








PAST MEDICAL HISTORY:  Significant for history of diabetes and hypertension.





PAST SURGICAL HISTORY:  Joint surgery.





FAMILY HISTORY:  Hypertension.





SOCIAL HISTORY:  Smoked for about 10 years and history of alcohol abuse.





ALLERGIES:  None.











fhx copd


Cardiovascular:  HTN


GI:  Gastritis


Heme/Onc:  No pertinent hx


Psych:  Anxiety, Addictions


Endocrine:  Diabetes





Family History


Family History:  Alcohol Abuse, Hypertension





Social History


Smoke:  No


ALCOHOL:  heavy


Drugs:  None





Vitals


Vitals





Vital Signs








  Date Time  Temp Pulse Resp B/P (MAP) Pulse Ox O2 Delivery O2 Flow Rate FiO2


 


1/12/21 11:33 98.2 112 18 155/91 (112) 98 Room Air  





 98.2       











Physical Exam


Physical Exam





Constitutional: Well developed, well nourished, mild  acute distress, non-toxic 

appearance.  MILD TREMORS 


HENT: Normocephalic, atraumatic, bilateral external ears normal, oropharynx 

moist, no oral exudates, nose normal. 


Eyes: PERRLA, EOMI, conjunctiva normal, no discharge.  


Neck: Normal range of motion, no tenderness, supple, no stridor.  


Cardiovascular:Heart rate regular rhythm, no murmur 


Lungs & Thorax:  Bilateral breath sounds clear to auscultation 


Abdomen: Bowel sounds normal, soft, no tenderness, no masses, no pulsatile 

masses.  


Skin: Warm, dry, no erythema, no rash.  


Back: No tenderness, no CVA tenderness.  


Extremities: No tenderness, no cyanosis, no clubbing, ROM intact, no edema.  


Neurologic: Alert and oriented X 3, normal motor function, normal sensory 

function, no focal deficits noted. 


Psychologic: Affect normal, judgment normal, mood normal.


General:  Alert, Oriented X3, Cooperative, mild distress


HEENT:  Atraumatic, PERRLA, EOMI


Lungs:  Clear to auscultation, Normal air movement


Heart:  RRR, no thrills


Breasts:  Not examined


Abdomen:  Soft


Rectal Exam:  not examined


Extremities:  No cyanosis, No edema


Neuro:  Normal speech, Strength at 5/5 X4 ext, Normal tone, Sensation intact, Cr

anial nerves 3-12 NL


Psych/Mental Status:  Mental status NL, Mood NL


General:  Alert, Oriented X3, Cooperative, mild distress


Heart:  Regular rate, Normal S1


Lungs:  Clear


Abdomen:  Soft


Extremities:  No cyanosis, No edema


Skin:  No rashes





Labs


LABS





Laboratory Tests








Test


 1/11/21


13:05 1/11/21


13:09 1/11/21


13:14 1/11/21


13:39


 


O2 Saturation 98 % (92-99)    


 


Arterial Blood pH


 7.52


(7.35-7.45) 


 


 





 


Arterial Blood pCO2 at


Patient Temp 19 mmHg


(35-46) 


 


 





 


Arterial Blood pO2 at Patient


Temp 102 mmHg


() 


 


 





 


Arterial Blood HCO3


 16 mmol/L


(21-28) 


 


 





 


Arterial Blood Base Excess


 -4 mmol/L


(-3-3) 


 


 





 


FiO2 21    


 


Urine Collection Type  Unknown   


 


Urine Color  Yellow   


 


Urine Clarity  Clear   


 


Urine pH  5.0 (<5.0-8.0)   


 


Urine Specific Gravity


 


 >=1.030


(1.000-1.030) 


 





 


Urine Protein


 


 100 mg/dL


(NEG-TRACE) 


 





 


Urine Glucose (UA)


 


 >=1000 mg/dL


(NEG) 


 





 


Urine Ketones (Stick)  15 mg/dL (NEG)   


 


Urine Blood  Trace (NEG)   


 


Urine Nitrite  Negative (NEG)   


 


Urine Bilirubin  Negative (NEG)   


 


Urine Urobilinogen Dipstick


 


 0.2 mg/dL (0.2


mg/dL) 


 





 


Urine Leukocyte Esterase  Negative (NEG)   


 


Urine RBC  0 /HPF (0-2)   


 


Urine WBC  Occ /HPF (0-4)   


 


Urine Squamous Epithelial


Cells 


 Occ /LPF 


 


 





 


Urine Bacteria  0 /HPF (0-FEW)   


 


Urine Hyaline Casts  Moderate /HPF   


 


Urine Mucus  Slight /LPF   


 


Urine Opiates Screen  Neg (NEG)   


 


Urine Methadone Screen  Neg (NEG)   


 


Urine Barbiturates  Neg (NEG)   


 


Urine Phencyclidine Screen  Neg (NEG)   


 


Urine


Amphetamine/Methamphetamine 


 Neg (NEG) 


 


 





 


Urine Benzodiazepines Screen  Neg (NEG)   


 


Urine Cocaine Screen  Neg (NEG)   


 


Urine Cannabinoids Screen  Pos (NEG)   


 


Urine Ethyl Alcohol  Neg (NEG)   


 


Lactic Acid Level


 


 


 6.6 mmol/L


(0.4-2.0) 





 


Glucose (Fingerstick)


 


 


 


 357 mg/dL


(70-99)


 


Test


 1/11/21


15:48 1/11/21


16:32 1/11/21


17:05 1/11/21


17:39


 


Sodium Level


 144 mmol/L


(136-145) 


 


 





 


Potassium Level


 3.1 mmol/L


(3.5-5.1) 


 


 





 


Chloride Level


 105 mmol/L


() 


 


 





 


Carbon Dioxide Level


 21 mmol/L


(21-32) 


 


 





 


Anion Gap 18 (6-14)    


 


Blood Urea Nitrogen


 25 mg/dL


(8-26) 


 


 





 


Creatinine


 1.7 mg/dL


(0.7-1.3) 


 


 





 


Estimated GFR


(Cockcroft-Gault) 43.6 


 


 


 





 


Glucose Level


 224 mg/dL


(70-99) 


 


 





 


Calcium Level


 8.3 mg/dL


(8.5-10.1) 


 


 





 


Phosphorus Level


 2.5 mg/dL


(2.6-4.7) 


 


 





 


Magnesium Level


 0.3 mg/dL


(1.8-2.4) 


 


 





 


Glucose (Fingerstick)


 


 180 mg/dL


(70-99) 


 202 mg/dL


(70-99)


 


Lactic Acid Level


 


 


 3.4 mmol/L


(0.4-2.0) 





 


Test


 1/11/21


18:45 1/11/21


19:45 1/11/21


20:08 1/11/21


21:01


 


Glucose (Fingerstick)


 200 mg/dL


(70-99) 154 mg/dL


(70-99) 


 138 mg/dL


(70-99)


 


Sodium Level


 


 


 144 mmol/L


(136-145) 





 


Potassium Level


 


 


 3.1 mmol/L


(3.5-5.1) 





 


Chloride Level


 


 


 107 mmol/L


() 





 


Carbon Dioxide Level


 


 


 24 mmol/L


(21-32) 





 


Anion Gap   13 (6-14)  


 


Blood Urea Nitrogen


 


 


 21 mg/dL


(8-26) 





 


Creatinine


 


 


 1.2 mg/dL


(0.7-1.3) 





 


Estimated GFR


(Cockcroft-Gault) 


 


 65.2 


 





 


Glucose Level


 


 


 139 mg/dL


(70-99) 





 


Calcium Level


 


 


 8.2 mg/dL


(8.5-10.1) 





 


Phosphorus Level


 


 


 2.8 mg/dL


(2.6-4.7) 





 


Magnesium Level


 


 


 1.6 mg/dL


(1.8-2.4) 





 


Test


 1/11/21


22:13 1/12/21


07:03 1/12/21


08:52 





 


Glucose (Fingerstick)


 103 mg/dL


(70-99) 198 mg/dL


(70-99) 


 





 


White Blood Count


 


 


 16.1 x10^3/uL


(4.0-11.0) 





 


Red Blood Count


 


 


 4.24 x10^6/uL


(4.30-5.70) 





 


Hemoglobin


 


 


 13.5 g/dL


(13.0-17.5) 





 


Hematocrit


 


 


 39.8 %


(39.0-53.0) 





 


Mean Corpuscular Volume   94 fL ()  


 


Mean Corpuscular Hemoglobin   32 pg (25-35)  


 


Mean Corpuscular Hemoglobin


Concent 


 


 34 g/dL


(31-37) 





 


Red Cell Distribution Width


 


 


 13.2 %


(11.5-14.5) 





 


Platelet Count


 


 


 300 x10^3/uL


(140-400) 





 


Neutrophils (%) (Auto)   82 % (31-73)  


 


Lymphocytes (%) (Auto)   10 % (24-48)  


 


Monocytes (%) (Auto)   8 % (0-9)  


 


Eosinophils (%) (Auto)   0 % (0-3)  


 


Basophils (%) (Auto)   0 % (0-3)  


 


Neutrophils # (Auto)


 


 


 13.2 x10^3/uL


(1.8-7.7) 





 


Lymphocytes # (Auto)


 


 


 1.6 x10^3/uL


(1.0-4.8) 





 


Monocytes # (Auto)


 


 


 1.2 x10^3/uL


(0.0-1.1) 





 


Eosinophils # (Auto)


 


 


 0.1 x10^3/uL


(0.0-0.7) 





 


Basophils # (Auto)


 


 


 0.0 x10^3/uL


(0.0-0.2) 





 


Sodium Level


 


 


 142 mmol/L


(136-145) 





 


Potassium Level


 


 


 3.2 mmol/L


(3.5-5.1) 





 


Chloride Level


 


 


 106 mmol/L


() 





 


Carbon Dioxide Level


 


 


 22 mmol/L


(21-32) 





 


Anion Gap   14 (6-14)  


 


Blood Urea Nitrogen


 


 


 13 mg/dL


(8-26) 





 


Creatinine


 


 


 1.1 mg/dL


(0.7-1.3) 





 


Estimated GFR


(Cockcroft-Gault) 


 


 72.1 


 





 


Glucose Level


 


 


 252 mg/dL


(70-99) 





 


Calcium Level


 


 


 7.9 mg/dL


(8.5-10.1) 





 


Phosphorus Level


 


 


 1.9 mg/dL


(2.6-4.7) 














Assessment and Plan


Assessmemt and Plan


Problems


Medical Problems:


(1) Alcohol withdrawal syndrome


Status: Acute  





(2) Low serum potassium level


Status: Acute  





(3) Uncontrolled diabetes mellitus


Status: Acute  











Comment


Review of Relevant


I have reviewed the following items abhijit (where applicable) has been applied.


Labs





Laboratory Tests








Test


 1/11/21


11:25 1/11/21


13:05 1/11/21


13:09 1/11/21


13:14


 


White Blood Count


 22.0 x10^3/uL


(4.0-11.0) 


 


 





 


Red Blood Count


 5.26 x10^6/uL


(4.30-5.70) 


 


 





 


Hemoglobin


 16.7 g/dL


(13.0-17.5) 


 


 





 


Hematocrit


 48.7 %


(39.0-53.0) 


 


 





 


Mean Corpuscular Volume 93 fL ()    


 


Mean Corpuscular Hemoglobin 32 pg (25-35)    


 


Mean Corpuscular Hemoglobin


Concent 34 g/dL


(31-37) 


 


 





 


Red Cell Distribution Width


 13.4 %


(11.5-14.5) 


 


 





 


Platelet Count


 412 x10^3/uL


(140-400) 


 


 





 


Neutrophils (%) (Auto) 87 % (31-73)    


 


Lymphocytes (%) (Auto) 7 % (24-48)    


 


Monocytes (%) (Auto) 7 % (0-9)    


 


Eosinophils (%) (Auto) 0 % (0-3)    


 


Basophils (%) (Auto) 0 % (0-3)    


 


Neutrophils # (Auto)


 19.0 x10^3/uL


(1.8-7.7) 


 


 





 


Lymphocytes # (Auto)


 1.5 x10^3/uL


(1.0-4.8) 


 


 





 


Monocytes # (Auto)


 1.4 x10^3/uL


(0.0-1.1) 


 


 





 


Eosinophils # (Auto)


 0.0 x10^3/uL


(0.0-0.7) 


 


 





 


Basophils # (Auto)


 0.1 x10^3/uL


(0.0-0.2) 


 


 





 


Segmented Neutrophils % 87 % (35-66)    


 


Band Neutrophils % 2 % (0-9)    


 


Lymphocytes % 5 % (24-48)    


 


Monocytes % 6 % (0-10)    


 


Platelet Estimate


 Increased


(ADEQUATE) 


 


 





 


Sodium Level


 137 mmol/L


(136-145) 


 


 





 


Potassium Level


 3.2 mmol/L


(3.5-5.1) 


 


 





 


Chloride Level


 92 mmol/L


() 


 


 





 


Carbon Dioxide Level


 17 mmol/L


(21-32) 


 


 





 


Anion Gap 28 (6-14)    


 


Blood Urea Nitrogen


 26 mg/dL


(8-26) 


 


 





 


Creatinine


 2.2 mg/dL


(0.7-1.3) 


 


 





 


Estimated GFR


(Cockcroft-Gault) 32.4 


 


 


 





 


BUN/Creatinine Ratio 12 (6-20)    


 


Glucose Level


 449 mg/dL


(70-99) 


 


 





 


Hemoglobin A1c


 7.0 %


(4.8-5.6) 


 


 





 


Calcium Level


 10.4 mg/dL


(8.5-10.1) 


 


 





 


Phosphorus Level


 3.5 mg/dL


(2.6-4.7) 


 


 





 


Magnesium Level


 0.3 mg/dL


(1.8-2.4) 


 


 





 


Total Bilirubin


 1.0 mg/dL


(0.2-1.0) 


 


 





 


Aspartate Amino Transf


(AST/SGOT) 19 U/L (15-37) 


 


 


 





 


Alanine Aminotransferase


(ALT/SGPT) 39 U/L (16-63) 


 


 


 





 


Alkaline Phosphatase


 73 U/L


() 


 


 





 


Total Protein


 8.9 g/dL


(6.4-8.2) 


 


 





 


Albumin


 4.8 g/dL


(3.4-5.0) 


 


 





 


Albumin/Globulin Ratio 1.2 (1.0-1.7)    


 


Lipase


 280 U/L


() 


 


 





 


Ethyl Alcohol Level


 < 10 mg/dL


(0-10) 


 


 





 


Acetone Level Neg (NEG)    


 


O2 Saturation  98 % (92-99)   


 


Arterial Blood pH


 


 7.52


(7.35-7.45) 


 





 


Arterial Blood pCO2 at


Patient Temp 


 19 mmHg


(35-46) 


 





 


Arterial Blood pO2 at Patient


Temp 


 102 mmHg


() 


 





 


Arterial Blood HCO3


 


 16 mmol/L


(21-28) 


 





 


Arterial Blood Base Excess


 


 -4 mmol/L


(-3-3) 


 





 


FiO2  21   


 


Urine Collection Type   Unknown  


 


Urine Color   Yellow  


 


Urine Clarity   Clear  


 


Urine pH   5.0 (<5.0-8.0)  


 


Urine Specific Gravity


 


 


 >=1.030


(1.000-1.030) 





 


Urine Protein


 


 


 100 mg/dL


(NEG-TRACE) 





 


Urine Glucose (UA)


 


 


 >=1000 mg/dL


(NEG) 





 


Urine Ketones (Stick)   15 mg/dL (NEG)  


 


Urine Blood   Trace (NEG)  


 


Urine Nitrite   Negative (NEG)  


 


Urine Bilirubin   Negative (NEG)  


 


Urine Urobilinogen Dipstick


 


 


 0.2 mg/dL (0.2


mg/dL) 





 


Urine Leukocyte Esterase   Negative (NEG)  


 


Urine RBC   0 /HPF (0-2)  


 


Urine WBC   Occ /HPF (0-4)  


 


Urine Squamous Epithelial


Cells 


 


 Occ /LPF 


 





 


Urine Bacteria   0 /HPF (0-FEW)  


 


Urine Hyaline Casts   Moderate /HPF  


 


Urine Mucus   Slight /LPF  


 


Urine Opiates Screen   Neg (NEG)  


 


Urine Methadone Screen   Neg (NEG)  


 


Urine Barbiturates   Neg (NEG)  


 


Urine Phencyclidine Screen   Neg (NEG)  


 


Urine


Amphetamine/Methamphetamine 


 


 Neg (NEG) 


 





 


Urine Benzodiazepines Screen   Neg (NEG)  


 


Urine Cocaine Screen   Neg (NEG)  


 


Urine Cannabinoids Screen   Pos (NEG)  


 


Urine Ethyl Alcohol   Neg (NEG)  


 


Lactic Acid Level


 


 


 


 6.6 mmol/L


(0.4-2.0)


 


Test


 1/11/21


13:39 1/11/21


15:48 1/11/21


16:32 1/11/21


17:05


 


Glucose (Fingerstick)


 357 mg/dL


(70-99) 


 180 mg/dL


(70-99) 





 


Sodium Level


 


 144 mmol/L


(136-145) 


 





 


Potassium Level


 


 3.1 mmol/L


(3.5-5.1) 


 





 


Chloride Level


 


 105 mmol/L


() 


 





 


Carbon Dioxide Level


 


 21 mmol/L


(21-32) 


 





 


Anion Gap  18 (6-14)   


 


Blood Urea Nitrogen


 


 25 mg/dL


(8-26) 


 





 


Creatinine


 


 1.7 mg/dL


(0.7-1.3) 


 





 


Estimated GFR


(Cockcroft-Gault) 


 43.6 


 


 





 


Glucose Level


 


 224 mg/dL


(70-99) 


 





 


Calcium Level


 


 8.3 mg/dL


(8.5-10.1) 


 





 


Phosphorus Level


 


 2.5 mg/dL


(2.6-4.7) 


 





 


Magnesium Level


 


 0.3 mg/dL


(1.8-2.4) 


 





 


Lactic Acid Level


 


 


 


 3.4 mmol/L


(0.4-2.0)


 


Test


 1/11/21


17:39 1/11/21


18:45 1/11/21


19:45 1/11/21


20:08


 


Glucose (Fingerstick)


 202 mg/dL


(70-99) 200 mg/dL


(70-99) 154 mg/dL


(70-99) 





 


Sodium Level


 


 


 


 144 mmol/L


(136-145)


 


Potassium Level


 


 


 


 3.1 mmol/L


(3.5-5.1)


 


Chloride Level


 


 


 


 107 mmol/L


()


 


Carbon Dioxide Level


 


 


 


 24 mmol/L


(21-32)


 


Anion Gap    13 (6-14) 


 


Blood Urea Nitrogen


 


 


 


 21 mg/dL


(8-26)


 


Creatinine


 


 


 


 1.2 mg/dL


(0.7-1.3)


 


Estimated GFR


(Cockcroft-Gault) 


 


 


 65.2 





 


Glucose Level


 


 


 


 139 mg/dL


(70-99)


 


Calcium Level


 


 


 


 8.2 mg/dL


(8.5-10.1)


 


Phosphorus Level


 


 


 


 2.8 mg/dL


(2.6-4.7)


 


Magnesium Level


 


 


 


 1.6 mg/dL


(1.8-2.4)


 


Test


 1/11/21


21:01 1/11/21


22:13 1/12/21


07:03 1/12/21


08:52


 


Glucose (Fingerstick)


 138 mg/dL


(70-99) 103 mg/dL


(70-99) 198 mg/dL


(70-99) 





 


White Blood Count


 


 


 


 16.1 x10^3/uL


(4.0-11.0)


 


Red Blood Count


 


 


 


 4.24 x10^6/uL


(4.30-5.70)


 


Hemoglobin


 


 


 


 13.5 g/dL


(13.0-17.5)


 


Hematocrit


 


 


 


 39.8 %


(39.0-53.0)


 


Mean Corpuscular Volume    94 fL () 


 


Mean Corpuscular Hemoglobin    32 pg (25-35) 


 


Mean Corpuscular Hemoglobin


Concent 


 


 


 34 g/dL


(31-37)


 


Red Cell Distribution Width


 


 


 


 13.2 %


(11.5-14.5)


 


Platelet Count


 


 


 


 300 x10^3/uL


(140-400)


 


Neutrophils (%) (Auto)    82 % (31-73) 


 


Lymphocytes (%) (Auto)    10 % (24-48) 


 


Monocytes (%) (Auto)    8 % (0-9) 


 


Eosinophils (%) (Auto)    0 % (0-3) 


 


Basophils (%) (Auto)    0 % (0-3) 


 


Neutrophils # (Auto)


 


 


 


 13.2 x10^3/uL


(1.8-7.7)


 


Lymphocytes # (Auto)


 


 


 


 1.6 x10^3/uL


(1.0-4.8)


 


Monocytes # (Auto)


 


 


 


 1.2 x10^3/uL


(0.0-1.1)


 


Eosinophils # (Auto)


 


 


 


 0.1 x10^3/uL


(0.0-0.7)


 


Basophils # (Auto)


 


 


 


 0.0 x10^3/uL


(0.0-0.2)


 


Sodium Level


 


 


 


 142 mmol/L


(136-145)


 


Potassium Level


 


 


 


 3.2 mmol/L


(3.5-5.1)


 


Chloride Level


 


 


 


 106 mmol/L


()


 


Carbon Dioxide Level


 


 


 


 22 mmol/L


(21-32)


 


Anion Gap    14 (6-14) 


 


Blood Urea Nitrogen


 


 


 


 13 mg/dL


(8-26)


 


Creatinine


 


 


 


 1.1 mg/dL


(0.7-1.3)


 


Estimated GFR


(Cockcroft-Gault) 


 


 


 72.1 





 


Glucose Level


 


 


 


 252 mg/dL


(70-99)


 


Calcium Level


 


 


 


 7.9 mg/dL


(8.5-10.1)


 


Phosphorus Level


 


 


 


 1.9 mg/dL


(2.6-4.7)








Laboratory Tests








Test


 1/11/21


13:05 1/11/21


13:09 1/11/21


13:14 1/11/21


13:39


 


O2 Saturation 98 % (92-99)    


 


Arterial Blood pH


 7.52


(7.35-7.45) 


 


 





 


Arterial Blood pCO2 at


Patient Temp 19 mmHg


(35-46) 


 


 





 


Arterial Blood pO2 at Patient


Temp 102 mmHg


() 


 


 





 


Arterial Blood HCO3


 16 mmol/L


(21-28) 


 


 





 


Arterial Blood Base Excess


 -4 mmol/L


(-3-3) 


 


 





 


FiO2 21    


 


Urine Collection Type  Unknown   


 


Urine Color  Yellow   


 


Urine Clarity  Clear   


 


Urine pH  5.0 (<5.0-8.0)   


 


Urine Specific Gravity


 


 >=1.030


(1.000-1.030) 


 





 


Urine Protein


 


 100 mg/dL


(NEG-TRACE) 


 





 


Urine Glucose (UA)


 


 >=1000 mg/dL


(NEG) 


 





 


Urine Ketones (Stick)  15 mg/dL (NEG)   


 


Urine Blood  Trace (NEG)   


 


Urine Nitrite  Negative (NEG)   


 


Urine Bilirubin  Negative (NEG)   


 


Urine Urobilinogen Dipstick


 


 0.2 mg/dL (0.2


mg/dL) 


 





 


Urine Leukocyte Esterase  Negative (NEG)   


 


Urine RBC  0 /HPF (0-2)   


 


Urine WBC  Occ /HPF (0-4)   


 


Urine Squamous Epithelial


Cells 


 Occ /LPF 


 


 





 


Urine Bacteria  0 /HPF (0-FEW)   


 


Urine Hyaline Casts  Moderate /HPF   


 


Urine Mucus  Slight /LPF   


 


Urine Opiates Screen  Neg (NEG)   


 


Urine Methadone Screen  Neg (NEG)   


 


Urine Barbiturates  Neg (NEG)   


 


Urine Phencyclidine Screen  Neg (NEG)   


 


Urine


Amphetamine/Methamphetamine 


 Neg (NEG) 


 


 





 


Urine Benzodiazepines Screen  Neg (NEG)   


 


Urine Cocaine Screen  Neg (NEG)   


 


Urine Cannabinoids Screen  Pos (NEG)   


 


Urine Ethyl Alcohol  Neg (NEG)   


 


Lactic Acid Level


 


 


 6.6 mmol/L


(0.4-2.0) 





 


Glucose (Fingerstick)


 


 


 


 357 mg/dL


(70-99)


 


Test


 1/11/21


15:48 1/11/21


16:32 1/11/21


17:05 1/11/21


17:39


 


Sodium Level


 144 mmol/L


(136-145) 


 


 





 


Potassium Level


 3.1 mmol/L


(3.5-5.1) 


 


 





 


Chloride Level


 105 mmol/L


() 


 


 





 


Carbon Dioxide Level


 21 mmol/L


(21-32) 


 


 





 


Anion Gap 18 (6-14)    


 


Blood Urea Nitrogen


 25 mg/dL


(8-26) 


 


 





 


Creatinine


 1.7 mg/dL


(0.7-1.3) 


 


 





 


Estimated GFR


(Cockcroft-Gault) 43.6 


 


 


 





 


Glucose Level


 224 mg/dL


(70-99) 


 


 





 


Calcium Level


 8.3 mg/dL


(8.5-10.1) 


 


 





 


Phosphorus Level


 2.5 mg/dL


(2.6-4.7) 


 


 





 


Magnesium Level


 0.3 mg/dL


(1.8-2.4) 


 


 





 


Glucose (Fingerstick)


 


 180 mg/dL


(70-99) 


 202 mg/dL


(70-99)


 


Lactic Acid Level


 


 


 3.4 mmol/L


(0.4-2.0) 





 


Test


 1/11/21


18:45 1/11/21


19:45 1/11/21


20:08 1/11/21


21:01


 


Glucose (Fingerstick)


 200 mg/dL


(70-99) 154 mg/dL


(70-99) 


 138 mg/dL


(70-99)


 


Sodium Level


 


 


 144 mmol/L


(136-145) 





 


Potassium Level


 


 


 3.1 mmol/L


(3.5-5.1) 





 


Chloride Level


 


 


 107 mmol/L


() 





 


Carbon Dioxide Level


 


 


 24 mmol/L


(21-32) 





 


Anion Gap   13 (6-14)  


 


Blood Urea Nitrogen


 


 


 21 mg/dL


(8-26) 





 


Creatinine


 


 


 1.2 mg/dL


(0.7-1.3) 





 


Estimated GFR


(Cockcroft-Gault) 


 


 65.2 


 





 


Glucose Level


 


 


 139 mg/dL


(70-99) 





 


Calcium Level


 


 


 8.2 mg/dL


(8.5-10.1) 





 


Phosphorus Level


 


 


 2.8 mg/dL


(2.6-4.7) 





 


Magnesium Level


 


 


 1.6 mg/dL


(1.8-2.4) 





 


Test


 1/11/21


22:13 1/12/21


07:03 1/12/21


08:52 





 


Glucose (Fingerstick)


 103 mg/dL


(70-99) 198 mg/dL


(70-99) 


 





 


White Blood Count


 


 


 16.1 x10^3/uL


(4.0-11.0) 





 


Red Blood Count


 


 


 4.24 x10^6/uL


(4.30-5.70) 





 


Hemoglobin


 


 


 13.5 g/dL


(13.0-17.5) 





 


Hematocrit


 


 


 39.8 %


(39.0-53.0) 





 


Mean Corpuscular Volume   94 fL ()  


 


Mean Corpuscular Hemoglobin   32 pg (25-35)  


 


Mean Corpuscular Hemoglobin


Concent 


 


 34 g/dL


(31-37) 





 


Red Cell Distribution Width


 


 


 13.2 %


(11.5-14.5) 





 


Platelet Count


 


 


 300 x10^3/uL


(140-400) 





 


Neutrophils (%) (Auto)   82 % (31-73)  


 


Lymphocytes (%) (Auto)   10 % (24-48)  


 


Monocytes (%) (Auto)   8 % (0-9)  


 


Eosinophils (%) (Auto)   0 % (0-3)  


 


Basophils (%) (Auto)   0 % (0-3)  


 


Neutrophils # (Auto)


 


 


 13.2 x10^3/uL


(1.8-7.7) 





 


Lymphocytes # (Auto)


 


 


 1.6 x10^3/uL


(1.0-4.8) 





 


Monocytes # (Auto)


 


 


 1.2 x10^3/uL


(0.0-1.1) 





 


Eosinophils # (Auto)


 


 


 0.1 x10^3/uL


(0.0-0.7) 





 


Basophils # (Auto)


 


 


 0.0 x10^3/uL


(0.0-0.2) 





 


Sodium Level


 


 


 142 mmol/L


(136-145) 





 


Potassium Level


 


 


 3.2 mmol/L


(3.5-5.1) 





 


Chloride Level


 


 


 106 mmol/L


() 





 


Carbon Dioxide Level


 


 


 22 mmol/L


(21-32) 





 


Anion Gap   14 (6-14)  


 


Blood Urea Nitrogen


 


 


 13 mg/dL


(8-26) 





 


Creatinine


 


 


 1.1 mg/dL


(0.7-1.3) 





 


Estimated GFR


(Cockcroft-Gault) 


 


 72.1 


 





 


Glucose Level


 


 


 252 mg/dL


(70-99) 





 


Calcium Level


 


 


 7.9 mg/dL


(8.5-10.1) 





 


Phosphorus Level


 


 


 1.9 mg/dL


(2.6-4.7) 











Medications





Current Medications


Sodium Chloride 1,000 ml @  1,000 mls/hr 1X  ONCE IV  Last administered on 

1/11/21at 11:29;  Start 1/11/21 at 11:30;  Stop 1/11/21 at 16:59;  Status DC


Ondansetron HCl (Zofran) 4 mg 1X  ONCE IVP  Last administered on 1/11/21at 

11:34;  Start 1/11/21 at 11:30;  Stop 1/11/21 at 11:31;  Status DC


Metoclopramide HCl (Reglan Vial) 10 mg 1X  ONCE IVP  Last administered on 

1/11/21at 13:20;  Start 1/11/21 at 12:30;  Stop 1/11/21 at 12:31;  Status DC


Famotidine (Pepcid Vial) 20 mg 1X  ONCE IVP  Last administered on 1/11/21at 

13:21;  Start 1/11/21 at 12:30;  Stop 1/11/21 at 12:31;  Status DC


Sodium Chloride 1,000 ml @  1,000 mls/hr 1X  ONCE IV  Last administered on 

1/11/21at 13:19;  Start 1/11/21 at 12:30;  Stop 1/11/21 at 16:59;  Status DC


Lorazepam (Ativan Inj) 1 mg 1X  ONCE IVP ;  Start 1/11/21 at 13:30;  Stop 

1/11/21 at 13:31;  Status Cancel


Lorazepam (Ativan Inj) 2 mg 1X  ONCE IVP  Last administered on 1/11/21at 13:18; 

Start 1/11/21 at 13:00;  Stop 1/11/21 at 13:09;  Status DC


Thiamine HCl 100 mg/Folic Acid 1 mg/Sodium Chloride 1,001.2 ml  @ 990.198 mls/hr

1X  ONCE IV  Last administered on 1/11/21at 13:47;  Start 1/11/21 at 14:00;  

Stop 1/11/21 at 15:00;  Status DC


Potassium Citrate (Urocit-K) 40 meq 1X  ONCE PO ;  Start 1/11/21 at 13:45;  Stop

1/11/21 at 13:46;  Status DC


Insulin Human Lispro (HumaLOG) 10 units 1X  ONCE SQ  Last administered on 

1/11/21at 13:40;  Start 1/11/21 at 13:45;  Stop 1/11/21 at 13:46;  Status DC


Thiamine HCl 100 mg/Folic Acid 1 mg/Sodium Chloride 1,001.2 ml  @ 99.012 mls/hr 

DAILY IV  Last administered on 1/12/21at 08:22;  Start 1/12/21 at 09:00;  Stop 

1/16/21 at 19:07


Multivitamins (Thera M Plus) 1 tab DAILY PO ;  Start 1/16/21 at 09:00


Folic Acid (Folic Acid) 1 mg DAILY PO ;  Start 1/16/21 at 09:00


Thiamine Mononitrate (Vitamin B-1) 100 mg DAILY PO ;  Start 1/16/21 at 09:00


Lorazepam (Ativan) 4 mg PRN Q1HR  PRN PO For CIWA 8-14;  Start 1/11/21 at 15:00


Lorazepam (Ativan) 8 mg PRN Q1HR  PRN PO For CIWA 15 or greater;  Start 1/11/21 

at 15:00


Lorazepam (Ativan Inj) 2 mg PRN Q1HR  PRN IV For CIWA 8-14 Last administered on 

1/11/21at 17:12;  Start 1/11/21 at 15:00


Lorazepam (Ativan Inj) 4 mg PRN Q1HR  PRN IV For CIWA 15 or greater;  Start 

1/11/21 at 15:00


Haloperidol Lactate (Haldol Inj) 5 mg PRN Q4HRS  PRN IVP 

Hallucinatns,Confusn,Delirium;  Start 1/11/21 at 15:00


Diphenhydramine HCl (Benadryl) 25 mg PRN Q15MIN  PRN IVP EPS symptoms 2'Haldol 

admin;  Start 1/11/21 at 15:00


Clonidine HCl (Catapres) 0.1 mg PRN Q1HR  PRN PO SBP > 180 or DBP > 100, MRX3;  

Start 1/11/21 at 15:00


Lorazepam (Ativan Inj) 2 mg PRN Q15MIN  PRN IV SEE COMMENTS;  Start 1/11/21 at 

15:00


Lorazepam (Ativan Inj) 4 mg PRN Q15MIN  PRN IV SEE COMMENTS;  Start 1/11/21 at 

15:00


Sodium Chloride 1,000 ml @  1,000 mls/hr Q1H IV ;  Start 1/11/21 at 15:00;  Stop

1/11/21 at 15:59;  Status DC


Sodium Chloride 1,000 ml @  500 mls/hr Q2H IV ;  Start 1/11/21 at 15:00;  Stop 

1/11/21 at 16:53;  Status DC


Sodium Chloride 1,000 ml @  250 mls/hr Q4H IV  Last administered on 1/11/21at 

15:41;  Start 1/11/21 at 15:00;  Stop 1/11/21 at 16:53;  Status DC


Sodium Chloride 1,000 ml @  250 mls/hr Q4H IV ;  Start 1/11/21 at 15:00;  Stop 

1/11/21 at 16:53;  Status DC


Dextrose/Sodium Chloride 1,000 ml @  250 mls/hr Q4H IV  Last administered on 

1/12/21at 08:19;  Start 1/11/21 at 15:00


Insulin Human Regular 100 unit/ Sodium Chloride 101 ml @ 0 mls/hr CONT PRN  PRN 

IV PER PROTOCOL;  Start 1/11/21 at 15:00;  Stop 1/11/21 at 15:08;  Status DC


Potassium Chloride/Water 100 ml @  100 mls/hr PRN Q1HR  PRN IV SEE COMMENTS;  

Start 1/11/21 at 15:00


Potassium Chloride/Water 100 ml @  100 mls/hr PRN Q1HR  PRN IV SEE COMMENTS;  

Start 1/11/21 at 15:00


Potassium Chloride/Water 100 ml @  100 mls/hr PRN Q1HR  PRN IV SEE COMMENTS;  

Start 1/11/21 at 15:00


Sodium Chloride 1,000 ml @  1,000 mls/hr Q1H IV ;  Start 1/11/21 at 15:00;  Stop

1/11/21 at 15:13;  Status DC


Sodium Chloride 1,000 ml @  500 mls/hr Q2H IV ;  Start 1/11/21 at 15:00;  Stop 

1/11/21 at 15:13;  Status DC


Sodium Chloride 1,000 ml @  250 mls/hr Q4H IV ;  Start 1/11/21 at 15:00;  Stop 

1/11/21 at 15:14;  Status DC


Insulin Human Regular 100 unit/ Sodium Chloride 101 ml @ 0 mls/hr CONT PRN  PRN 

IV PER PROTOCOL Last administered on 1/11/21at 15:42;  Start 1/11/21 at 15:00


Potassium Chloride/Water 100 ml @  100 mls/hr PRN Q1HR  PRN IV SEE COMMENTS;  

Start 1/11/21 at 15:00;  Status UNV


Potassium Chloride/Water 100 ml @  100 mls/hr PRN Q1HR  PRN IV SEE COMMENTS;  

Start 1/11/21 at 15:00;  Status UNV


Potassium Chloride/Water 100 ml @  100 mls/hr PRN Q1HR  PRN IV SEE COMMENTS;  

Start 1/11/21 at 15:00;  Status UNV


Magnesium Sulfate 100 ml @  25 mls/hr DAILY IV  Last administered on 1/12/21at 

08:25;  Start 1/11/21 at 17:00;  Stop 1/15/21 at 16:59


Sodium Bicarbonate 50 meq/Sodium Chloride 1,050 ml @  500 mls/hr Q2H6M PRN IV 

SEE COMMENTS;  Start 1/11/21 at 15:00;  Stop 1/11/21 at 15:20;  Status DC


Ondansetron HCl (Zofran) 4 mg PRN Q4HRS  PRN IV NAUSEA/VOMITING;  Start 1/11/21 

at 15:00


Acetaminophen (Tylenol) 650 mg PRN Q4HRS  PRN PO TEMP OVER 100.4F OR MILD PAIN; 

Start 1/11/21 at 15:00


Al Hydroxide/Mg Hydroxide (Mylanta Plus Xs) 30 ml PRN DAILY  PRN PO HEARTBURN / 

GAS;  Start 1/11/21 at 15:00


Clonidine HCl (Catapres) 0.1 mg PRN Q6HRS  PRN PO SBP>160 OR DBP>90;  Start 

1/11/21 at 15:00;  Stop 1/11/21 at 15:08;  Status DC


Sodium Monofluorophosphate (Fleet Adult) 133 ml PRN DAILY  PRN OK CONSTIPATION; 

Start 1/11/21 at 15:00


Albuterol Sulfate (Ventolin Neb Soln) 2.5 mg PRN Q4HRS  PRN NEB SHORTNESS OF 

BREATH;  Start 1/11/21 at 15:00


Enoxaparin Sodium (Lovenox 40mg Syringe) 40 mg Q24H SQ  Last administered on 

1/11/21at 21:52;  Start 1/11/21 at 21:00


Labetalol HCl (Normodyne Iv Push) 20 mg PRN Q2HR  PRN IVP HYPERTENSION;  Start 

1/11/21 at 15:15


Sodium Bicarbonate 50 meq/Sodium Chloride 1,050 ml @  500 mls/hr PRN Q2HRS  PRN 

IV DKA PROTOCOL;  Start 1/11/21 at 15:30;  Stop 1/11/21 at 16:53;  Status DC


Insulin Human Regular 100 ml @  As Directed STK-MED ONCE IV ;  Start 1/11/21 at 

15:31;  Stop 1/11/21 at 15:31;  Status DC


Magnesium Sulfate 100 ml @  25 mls/hr DAILY IV ;  Start 1/12/21 at 09:00;  Stop 

1/11/21 at 15:40;  Status DC


Potassium Chloride/Water 100 ml @  100 mls/hr Q1H IV  Last administered on 

1/11/21at 20:32;  Start 1/11/21 at 17:00;  Stop 1/11/21 at 20:59;  Status DC


Pantoprazole Sodium (PROTONIX VIAL for IV PUSH) 40 mg DAILYAC IVP  Last 

administered on 1/12/21at 08:20;  Start 1/11/21 at 17:00;  Stop 1/12/21 at 

10:42;  Status DC


Dextrose/Sodium Chloride 1,000 ml @  250 mls/hr Q4H IV ;  Start 1/11/21 at 

17:00;  Status UNV


Insulin Glargine (Lantus Syringe) 10 unit 1X  ONCE SQ  Last administered on 

1/11/21at 21:43;  Start 1/11/21 at 21:15;  Stop 1/11/21 at 21:20;  Status DC


Insulin Human Lispro (HumaLOG) 0-5 UNITS TIDWMEALS SQ  Last administered on 

1/12/21at 08:29;  Start 1/12/21 at 08:00


Dextrose (Dextrose 50%-Water Syringe) 12.5 gm PRN Q15MIN  PRN IV SEE COMMENTS;  

Start 1/11/21 at 21:15


Magnesium Sulfate 50 ml @ 25 mls/hr 1X  ONCE IV  Last administered on 1/11/21at 

21:50;  Start 1/11/21 at 21:30;  Stop 1/11/21 at 23:29;  Status DC


Pantoprazole Sodium (Protonix) 40 mg DAILYAC PO ;  Start 1/13/21 at 07:30





Active Scripts


Active


Reported


Farxiga (Dapagliflozin Propanediol) 10 Mg Tablet 10 Mg PO 


Atorvastatin Calcium 20 Mg Tablet 1 Tab PO HS


Losartan Potassium 100 Mg Tablet 100 Mg PO HS


Paroxetine Hcl 20 Mg Tablet 1 Tab PO HS


Fenofibrate (Fenofibrate,Micronized) 134 Mg Capsule 1 Cap PO DAILY


Benzonatate 100 Mg Capsule 1 Cap PO BID


Metoprolol Succinate ( Xl ) (Metoprolol Succinate) 25 Mg Tab.er.24h 2 Tab PO BID


Omeprazole 20 Mg Capsule. 1 Cap PO DAILY


Vitals/I & O





Vital Sign - Last 24 Hours








 1/11/21 1/11/21 1/11/21 1/11/21





 12:25 13:27 13:57 14:27


 


Pulse 104 106 116 126


 


Resp   20 18


 


B/P (MAP)  143/96 (112) 147/91 (109) 162/92 (115)


 


Pulse Ox 98 98 97 98


 


O2 Delivery Room Air Room Air Room Air Room Air





 1/11/21 1/11/21 1/11/21 1/11/21





 14:57 16:00 17:00 18:48


 


Pulse 126 134 130 132


 


Resp 18 18 18 


 


B/P (MAP) 157/82 (107) 135/83 (100) 152/75 (100) 125/78 (94)


 


Pulse Ox 97 98 98 98


 


O2 Delivery Room Air Room Air Room Air Room Air





 1/11/21 1/11/21 1/11/21 1/11/21





 21:03 22:46 23:10 23:59


 


Temp   97.8 





   97.8 


 


Pulse 112 106 117 


 


Resp  16 24 


 


B/P (MAP) 125/84 (98) 126/76 (93) 151/88 (109) 


 


Pulse Ox 96 96 97 


 


O2 Delivery Room Air Room Air Room Air Room Air


 


    





    





 1/12/21 1/12/21 1/12/21 1/12/21





 03:00 07:45 08:16 11:33


 


Temp 97.6 97.0  98.2





 97.6 97.0  98.2


 


Pulse 117 105  112


 


Resp 17 18  18


 


B/P (MAP) 133/86 (102) 167/84 (111)  155/91 (112)


 


Pulse Ox 96 95  98


 


O2 Delivery Room Air Room Air Room Air Room Air














Intake and Output   


 


 1/11/21 1/11/21 1/12/21





 15:00 23:00 07:00


 


Intake Total 3001.2 ml 450 ml 200 ml


 


Output Total  200 ml 1250 ml


 


Balance 3001.2 ml 250 ml -1050 ml











Justicifation of Admission Dx:


Justifications for Admission:


Justification of Admission Dx:  Yes











JM KNOWLES MD          Jan 12, 2021 11:50 XR Chest PA Expiration   Final Result   IMPRESSION:  No pneumothorax               US Lower Extremity Venous Duplex Bilat   Final Result   IMPRESSION:         1. No sonographic evidence of deep venous thrombosis in either lower   extremity.   2. Diminutive flow within the calf vessels, favored technical.   3. Lymph node suspected within the left groin, incompletely characterized   on these images but does appear similar to prior.      XR CHEST AP OR PA - PORTABLE   Final Result   IMPRESSION:       1. Mild central congestive changes.   2. Probable mach line/skin fold along the right lateral chest wall.   Pneumothorax felt less likely. Correlation with upright expiratory view is   recommended.      Case discussed with ordering clinician, Dr. Alberto at 6:50 PM on   3/9/2020.          She has a bilateral lower extremity edema and is being treated for mild fluid overload.  She received a dose of Lasix.  Her potassium is on the low side as well so this was supplemented.  The left leg is noticeably more red than the right leg and warmer.  I will also cover her with Keflex because she says it has turned red and warm in just the past 24 hours.    Diagnosis:   1. Bilateral leg edema    2. Cellulitis of left leg        Prescription(s):     Summary of your Discharge Medications      Take these Medications      Details   cephalexin 500 MG capsule  Commonly known as:  KEFLEX   Take 1 capsule by mouth 3 times daily for 7 days.     furosemide 40 MG tablet  Commonly known as:  LASIX   Take 1 tablet by mouth 2 times daily.     * potassium chloride 10 MEQ kamaljit ER tablet  Commonly known as:  KLOR-CON M   TAKE TWO TABLETS BY MOUTH TWICE A DAY     * potassium CHLORIDE 20 MEQ kamaljit ER tablet  Commonly known as:  KLOR-CON M   Take 1 tablet by mouth daily.         * This list has 2 medication(s) that are the same as other medications prescribed for you. Read the directions carefully, and ask your doctor or other care provider to review  them with you.                   Follow-Up:   Steven J Heyden, MD  [address] [place] [address]  [phone]    In 2 days               Elif Camp MD  03/10/20 0136

## 2021-01-13 VITALS — SYSTOLIC BLOOD PRESSURE: 167 MMHG | DIASTOLIC BLOOD PRESSURE: 100 MMHG

## 2021-01-13 VITALS
DIASTOLIC BLOOD PRESSURE: 95 MMHG | DIASTOLIC BLOOD PRESSURE: 95 MMHG | SYSTOLIC BLOOD PRESSURE: 166 MMHG | DIASTOLIC BLOOD PRESSURE: 95 MMHG | SYSTOLIC BLOOD PRESSURE: 166 MMHG | SYSTOLIC BLOOD PRESSURE: 166 MMHG

## 2021-01-13 VITALS — DIASTOLIC BLOOD PRESSURE: 95 MMHG | SYSTOLIC BLOOD PRESSURE: 166 MMHG

## 2021-01-13 VITALS — SYSTOLIC BLOOD PRESSURE: 151 MMHG | DIASTOLIC BLOOD PRESSURE: 106 MMHG

## 2021-01-13 LAB
ALBUMIN SERPL-MCNC: 3.4 G/DL (ref 3.4–5)
ALBUMIN/GLOB SERPL: 0.9 {RATIO} (ref 1–1.7)
ALP SERPL-CCNC: 63 U/L (ref 46–116)
ALT SERPL-CCNC: 34 U/L (ref 16–63)
ANION GAP SERPL CALC-SCNC: 13 MMOL/L (ref 6–14)
AST SERPL-CCNC: 34 U/L (ref 15–37)
BASOPHILS # BLD AUTO: 0.1 X10^3/UL (ref 0–0.2)
BASOPHILS NFR BLD: 1 % (ref 0–3)
BILIRUB SERPL-MCNC: 0.8 MG/DL (ref 0.2–1)
BUN SERPL-MCNC: 8 MG/DL (ref 8–26)
BUN/CREAT SERPL: 7 (ref 6–20)
CALCIUM SERPL-MCNC: 8 MG/DL (ref 8.5–10.1)
CHLORIDE SERPL-SCNC: 105 MMOL/L (ref 98–107)
CO2 SERPL-SCNC: 24 MMOL/L (ref 21–32)
CREAT SERPL-MCNC: 1.1 MG/DL (ref 0.7–1.3)
EOSINOPHIL NFR BLD: 0.1 X10^3/UL (ref 0–0.7)
EOSINOPHIL NFR BLD: 2 % (ref 0–3)
ERYTHROCYTE [DISTWIDTH] IN BLOOD BY AUTOMATED COUNT: 13.3 % (ref 11.5–14.5)
GFR SERPLBLD BASED ON 1.73 SQ M-ARVRAT: 72.1 ML/MIN
GLUCOSE SERPL-MCNC: 209 MG/DL (ref 70–99)
HCT VFR BLD CALC: 40.8 % (ref 39–53)
HGB BLD-MCNC: 14.3 G/DL (ref 13–17.5)
LYMPHOCYTES # BLD: 2.3 X10^3/UL (ref 1–4.8)
LYMPHOCYTES NFR BLD AUTO: 27 % (ref 24–48)
MCH RBC QN AUTO: 33 PG (ref 25–35)
MCHC RBC AUTO-ENTMCNC: 35 G/DL (ref 31–37)
MCV RBC AUTO: 93 FL (ref 79–100)
MONO #: 0.7 X10^3/UL (ref 0–1.1)
MONOCYTES NFR BLD: 8 % (ref 0–9)
NEUT #: 5.5 X10^3/UL (ref 1.8–7.7)
NEUTROPHILS NFR BLD AUTO: 63 % (ref 31–73)
PLATELET # BLD AUTO: 275 X10^3/UL (ref 140–400)
POTASSIUM SERPL-SCNC: 3.3 MMOL/L (ref 3.5–5.1)
PROT SERPL-MCNC: 7.1 G/DL (ref 6.4–8.2)
RBC # BLD AUTO: 4.37 X10^6/UL (ref 4.3–5.7)
SODIUM SERPL-SCNC: 142 MMOL/L (ref 136–145)
WBC # BLD AUTO: 8.6 X10^3/UL (ref 4–11)

## 2021-01-13 RX ADMIN — INSULIN LISPRO SCH UNITS: 100 INJECTION, SOLUTION INTRAVENOUS; SUBCUTANEOUS at 11:58

## 2021-01-13 RX ADMIN — DEXTROSE AND SODIUM CHLORIDE SCH MLS/HR: 5; .45 INJECTION, SOLUTION INTRAVENOUS at 03:45

## 2021-01-13 RX ADMIN — THIAMINE HYDROCHLORIDE SCH MLS/HR: 100 INJECTION, SOLUTION INTRAMUSCULAR; INTRAVENOUS at 09:07

## 2021-01-13 RX ADMIN — INSULIN LISPRO SCH UNITS: 100 INJECTION, SOLUTION INTRAVENOUS; SUBCUTANEOUS at 09:12

## 2021-01-13 RX ADMIN — Medication SCH MLS/HR: at 08:12

## 2021-01-13 NOTE — PDOC
PROGRESS NOTES


Date of Service:


DATE: 1/13/21 


TIME: 12:35





Chief Complaint


Chief Complaint





VTE Prophylaxis Ordered


VTE Prophylaxis Devices:  Yes


VTE Pharmacological Prophylaxi:  Yes





Assessment/Plan


Assessment/Plan


==========================================discharge dx 

===============================================





1. HX ALCOHOL ABUSE WITH Acute withdrawal symptoms


2. uncontrolled diabetes, nonketotic hyperosmolar hyperglycemia


3. hypokalemia


4. NONCOMPLIANCE 


5. HYPOKALEMIA ON REPLACEMENT RC


6. acute gastroenteritis , vomiting resolved











plan=============








ADMIT


ADAT ADA 


IV FLUIDS


CIWA PROTOCOL


INSULIN DRIP


THIAMINE


FOLIC ACID


Alcohol cessation discussed


DVT PROPHYLAXIS


[facility] outpatient EGD.














D/C PLANNING 34 MIN











D/W RN    likely had food poisoning.  Much better now.  OK with gi  to go home 

on prior meds.  [facility] EGD re: chronic GERD--.[facility]?





History of Present Illness


History of Present Illness





Identification/Chief Complaint


Chief Complaint





seen in er with intractable vomiting, alcohol withdrawal symptoms 45  year old 

presents to the emergency department complaining of nausea and vomiting after 

drinking last Saturday night. 





woke up Sunday morning with hot flashes and started vomiting.  Patient states he

vomited approximately 15 times in the last 24 hours, patient states that he 

notices water and yellow which vomitus without blood.  








glucose elevated at 449   states he is a type II diabetic although he does not 

check his blood sugars often stating that he checked his blood sugar last month 

that has been fine lately





.  Patient denies any abdominal pains, chest pains, shortness of breath.  

Patient denies any increased thirst or increased urination.  Patient states he 

has been in DKA in the past





Past Medical History


Past Medical History:  Anxiety, Diabetes-Type II, GERD, High Cholesterol, 

Hypertension


Past Surgical History:  Other


Additional Past Surgical Histo:  right jaw; right foot; right knee


Smoking Status:  Former Smoker


Alcohol Use:  Occasionally


Drug Use:  Marijuana








PAST MEDICAL HISTORY:  Significant for history of diabetes and hypertension.





PAST SURGICAL HISTORY:  Joint surgery.





FAMILY HISTORY:  Hypertension.





SOCIAL HISTORY:  Smoked for about 10 years and history of alcohol abuse.





ALLERGIES:  None.











fhx copd


Cardiovascular:  HTN


GI:  Gastritis


Heme/Onc:  No pertinent hx


Psych:  Anxiety, Addictions


Endocrine:  Diabetes





Family History


Family History:  Alcohol Abuse, Hypertension





Social History


Smoke:  No


ALCOHOL:  heavy


Drugs:  None





Vitals


Vitals





Vital Signs








  Date Time  Temp Pulse Resp B/P (MAP) Pulse Ox O2 Delivery O2 Flow Rate FiO2


 


1/13/21 11:55  102  166/95    


 


1/13/21 11:00 98.3  20  97 Room Air  





 98.3       











Physical Exam


Physical Exam





Constitutional: Well developed, well nourished, mild  acute distress, non-toxic 

appearance.  MILD TREMORS 


HENT: Normocephalic, atraumatic, bilateral external ears normal, oropharynx 

moist, no oral exudates, nose normal. 


Eyes: PERRLA, EOMI, conjunctiva normal, no discharge.  


Neck: Normal range of motion, no tenderness, supple, no stridor.  


Cardiovascular:Heart rate regular rhythm, no murmur 


Lungs & Thorax:  Bilateral breath sounds clear to auscultation 


Abdomen: Bowel sounds normal, soft, no tenderness, no masses, no pulsatile 

masses.  


Skin: Warm, dry, no erythema, no rash.  


Back: No tenderness, no CVA tenderness.  


Extremities: No tenderness, no cyanosis, no clubbing, ROM intact, no edema.  


Neurologic: Alert and oriented X 3, normal motor function, normal sensory 

function, no focal deficits noted. 


Psychologic: Affect normal, judgment normal, mood normal.


General:  Alert, Oriented X3, Cooperative, mild distress


HEENT:  Atraumatic, PERRLA, EOMI


Lungs:  Clear to auscultation, Normal air movement


Heart:  RRR, no thrills


Breasts:  Not examined


Abdomen:  Soft


Rectal Exam:  not examined


Extremities:  No cyanosis, No edema


Neuro:  Normal speech, Strength at 5/5 X4 ext, Normal tone, Sensation intact, 

Cranial nerves 3-12 NL


Psych/Mental Status:  Mental status NL, Mood NL


General:  Alert, Oriented X3, Cooperative, No acute distress, Other (no tremor)


Heart:  Regular rate, Normal S1, Normal S2


Lungs:  Clear


Abdomen:  Soft


Extremities:  No cyanosis, No edema


Skin:  No rashes, No breakdown





Labs


LABS





Laboratory Tests








Test


 1/12/21


16:29 1/12/21


19:56 1/13/21


07:28 1/13/21


07:40


 


Glucose (Fingerstick)


 181 mg/dL


(70-99) 197 mg/dL


(70-99) 


 211 mg/dL


(70-99)


 


White Blood Count


 


 


 8.6 x10^3/uL


(4.0-11.0) 





 


Red Blood Count


 


 


 4.37 x10^6/uL


(4.30-5.70) 





 


Hemoglobin


 


 


 14.3 g/dL


(13.0-17.5) 





 


Hematocrit


 


 


 40.8 %


(39.0-53.0) 





 


Mean Corpuscular Volume   93 fL ()  


 


Mean Corpuscular Hemoglobin   33 pg (25-35)  


 


Mean Corpuscular Hemoglobin


Concent 


 


 35 g/dL


(31-37) 





 


Red Cell Distribution Width


 


 


 13.3 %


(11.5-14.5) 





 


Platelet Count


 


 


 275 x10^3/uL


(140-400) 





 


Neutrophils (%) (Auto)   63 % (31-73)  


 


Lymphocytes (%) (Auto)   27 % (24-48)  


 


Monocytes (%) (Auto)   8 % (0-9)  


 


Eosinophils (%) (Auto)   2 % (0-3)  


 


Basophils (%) (Auto)   1 % (0-3)  


 


Neutrophils # (Auto)


 


 


 5.5 x10^3/uL


(1.8-7.7) 





 


Lymphocytes # (Auto)


 


 


 2.3 x10^3/uL


(1.0-4.8) 





 


Monocytes # (Auto)


 


 


 0.7 x10^3/uL


(0.0-1.1) 





 


Eosinophils # (Auto)


 


 


 0.1 x10^3/uL


(0.0-0.7) 





 


Basophils # (Auto)


 


 


 0.1 x10^3/uL


(0.0-0.2) 





 


Sodium Level


 


 


 142 mmol/L


(136-145) 





 


Potassium Level


 


 


 3.3 mmol/L


(3.5-5.1) 





 


Chloride Level


 


 


 105 mmol/L


() 





 


Carbon Dioxide Level


 


 


 24 mmol/L


(21-32) 





 


Anion Gap   13 (6-14)  


 


Blood Urea Nitrogen   8 mg/dL (8-26)  


 


Creatinine


 


 


 1.1 mg/dL


(0.7-1.3) 





 


Estimated GFR


(Cockcroft-Gault) 


 


 72.1 


 





 


BUN/Creatinine Ratio   7 (6-20)  


 


Glucose Level


 


 


 209 mg/dL


(70-99) 





 


Calcium Level


 


 


 8.0 mg/dL


(8.5-10.1) 





 


Phosphorus Level


 


 


 1.9 mg/dL


(2.6-4.7) 





 


Total Bilirubin


 


 


 0.8 mg/dL


(0.2-1.0) 





 


Aspartate Amino Transf


(AST/SGOT) 


 


 34 U/L (15-37) 


 





 


Alanine Aminotransferase


(ALT/SGPT) 


 


 34 U/L (16-63) 


 





 


Alkaline Phosphatase


 


 


 63 U/L


() 





 


Total Protein


 


 


 7.1 g/dL


(6.4-8.2) 





 


Albumin


 


 


 3.4 g/dL


(3.4-5.0) 





 


Albumin/Globulin Ratio   0.9 (1.0-1.7)  


 


Test


 1/13/21


11:19 


 


 





 


Glucose (Fingerstick)


 221 mg/dL


(70-99) 


 


 














Assessment and Plan


Assessmemt and Plan


Problems


Medical Problems:


(1) Alcohol withdrawal syndrome


Status: Acute  





(2) Low serum potassium level


Status: Acute  





(3) Uncontrolled diabetes mellitus


Status: Acute  











Comment


Review of Relevant


I have reviewed the following items abhijit (where applicable) has been applied.


Labs





Laboratory Tests








Test


 1/11/21


13:05 1/11/21


13:09 1/11/21


13:14 1/11/21


13:39


 


O2 Saturation 98 % (92-99)    


 


Arterial Blood pH


 7.52


(7.35-7.45) 


 


 





 


Arterial Blood pCO2 at


Patient Temp 19 mmHg


(35-46) 


 


 





 


Arterial Blood pO2 at Patient


Temp 102 mmHg


() 


 


 





 


Arterial Blood HCO3


 16 mmol/L


(21-28) 


 


 





 


Arterial Blood Base Excess


 -4 mmol/L


(-3-3) 


 


 





 


FiO2 21    


 


Urine Collection Type  Unknown   


 


Urine Color  Yellow   


 


Urine Clarity  Clear   


 


Urine pH  5.0 (<5.0-8.0)   


 


Urine Specific Gravity


 


 >=1.030


(1.000-1.030) 


 





 


Urine Protein


 


 100 mg/dL


(NEG-TRACE) 


 





 


Urine Glucose (UA)


 


 >=1000 mg/dL


(NEG) 


 





 


Urine Ketones (Stick)  15 mg/dL (NEG)   


 


Urine Blood  Trace (NEG)   


 


Urine Nitrite  Negative (NEG)   


 


Urine Bilirubin  Negative (NEG)   


 


Urine Urobilinogen Dipstick


 


 0.2 mg/dL (0.2


mg/dL) 


 





 


Urine Leukocyte Esterase  Negative (NEG)   


 


Urine RBC  0 /HPF (0-2)   


 


Urine WBC  Occ /HPF (0-4)   


 


Urine Squamous Epithelial


Cells 


 Occ /LPF 


 


 





 


Urine Bacteria  0 /HPF (0-FEW)   


 


Urine Hyaline Casts  Moderate /HPF   


 


Urine Mucus  Slight /LPF   


 


Urine Opiates Screen  Neg (NEG)   


 


Urine Methadone Screen  Neg (NEG)   


 


Urine Barbiturates  Neg (NEG)   


 


Urine Phencyclidine Screen  Neg (NEG)   


 


Urine


Amphetamine/Methamphetamine 


 Neg (NEG) 


 


 





 


Urine Benzodiazepines Screen  Neg (NEG)   


 


Urine Cocaine Screen  Neg (NEG)   


 


Urine Cannabinoids Screen  Pos (NEG)   


 


Urine Ethyl Alcohol  Neg (NEG)   


 


Lactic Acid Level


 


 


 6.6 mmol/L


(0.4-2.0) 





 


Glucose (Fingerstick)


 


 


 


 357 mg/dL


(70-99)


 


Test


 1/11/21


15:48 1/11/21


16:32 1/11/21


17:05 1/11/21


17:39


 


Sodium Level


 144 mmol/L


(136-145) 


 


 





 


Potassium Level


 3.1 mmol/L


(3.5-5.1) 


 


 





 


Chloride Level


 105 mmol/L


() 


 


 





 


Carbon Dioxide Level


 21 mmol/L


(21-32) 


 


 





 


Anion Gap 18 (6-14)    


 


Blood Urea Nitrogen


 25 mg/dL


(8-26) 


 


 





 


Creatinine


 1.7 mg/dL


(0.7-1.3) 


 


 





 


Estimated GFR


(Cockcroft-Gault) 43.6 


 


 


 





 


Glucose Level


 224 mg/dL


(70-99) 


 


 





 


Calcium Level


 8.3 mg/dL


(8.5-10.1) 


 


 





 


Phosphorus Level


 2.5 mg/dL


(2.6-4.7) 


 


 





 


Magnesium Level


 0.3 mg/dL


(1.8-2.4) 


 


 





 


Glucose (Fingerstick)


 


 180 mg/dL


(70-99) 


 202 mg/dL


(70-99)


 


Lactic Acid Level


 


 


 3.4 mmol/L


(0.4-2.0) 





 


Test


 1/11/21


18:45 1/11/21


19:45 1/11/21


20:08 1/11/21


21:01


 


Glucose (Fingerstick)


 200 mg/dL


(70-99) 154 mg/dL


(70-99) 


 138 mg/dL


(70-99)


 


Sodium Level


 


 


 144 mmol/L


(136-145) 





 


Potassium Level


 


 


 3.1 mmol/L


(3.5-5.1) 





 


Chloride Level


 


 


 107 mmol/L


() 





 


Carbon Dioxide Level


 


 


 24 mmol/L


(21-32) 





 


Anion Gap   13 (6-14)  


 


Blood Urea Nitrogen


 


 


 21 mg/dL


(8-26) 





 


Creatinine


 


 


 1.2 mg/dL


(0.7-1.3) 





 


Estimated GFR


(Cockcroft-Gault) 


 


 65.2 


 





 


Glucose Level


 


 


 139 mg/dL


(70-99) 





 


Calcium Level


 


 


 8.2 mg/dL


(8.5-10.1) 





 


Phosphorus Level


 


 


 2.8 mg/dL


(2.6-4.7) 





 


Magnesium Level


 


 


 1.6 mg/dL


(1.8-2.4) 





 


Test


 1/11/21


22:13 1/12/21


07:03 1/12/21


08:52 1/12/21


11:26


 


Glucose (Fingerstick)


 103 mg/dL


(70-99) 198 mg/dL


(70-99) 


 252 mg/dL


(70-99)


 


White Blood Count


 


 


 16.1 x10^3/uL


(4.0-11.0) 





 


Red Blood Count


 


 


 4.24 x10^6/uL


(4.30-5.70) 





 


Hemoglobin


 


 


 13.5 g/dL


(13.0-17.5) 





 


Hematocrit


 


 


 39.8 %


(39.0-53.0) 





 


Mean Corpuscular Volume   94 fL ()  


 


Mean Corpuscular Hemoglobin   32 pg (25-35)  


 


Mean Corpuscular Hemoglobin


Concent 


 


 34 g/dL


(31-37) 





 


Red Cell Distribution Width


 


 


 13.2 %


(11.5-14.5) 





 


Platelet Count


 


 


 300 x10^3/uL


(140-400) 





 


Neutrophils (%) (Auto)   82 % (31-73)  


 


Lymphocytes (%) (Auto)   10 % (24-48)  


 


Monocytes (%) (Auto)   8 % (0-9)  


 


Eosinophils (%) (Auto)   0 % (0-3)  


 


Basophils (%) (Auto)   0 % (0-3)  


 


Neutrophils # (Auto)


 


 


 13.2 x10^3/uL


(1.8-7.7) 





 


Lymphocytes # (Auto)


 


 


 1.6 x10^3/uL


(1.0-4.8) 





 


Monocytes # (Auto)


 


 


 1.2 x10^3/uL


(0.0-1.1) 





 


Eosinophils # (Auto)


 


 


 0.1 x10^3/uL


(0.0-0.7) 





 


Basophils # (Auto)


 


 


 0.0 x10^3/uL


(0.0-0.2) 





 


Sodium Level


 


 


 142 mmol/L


(136-145) 





 


Potassium Level


 


 


 3.2 mmol/L


(3.5-5.1) 





 


Chloride Level


 


 


 106 mmol/L


() 





 


Carbon Dioxide Level


 


 


 22 mmol/L


(21-32) 





 


Anion Gap   14 (6-14)  


 


Blood Urea Nitrogen


 


 


 13 mg/dL


(8-26) 





 


Creatinine


 


 


 1.1 mg/dL


(0.7-1.3) 





 


Estimated GFR


(Cockcroft-Gault) 


 


 72.1 


 





 


Glucose Level


 


 


 252 mg/dL


(70-99) 





 


Calcium Level


 


 


 7.9 mg/dL


(8.5-10.1) 





 


Phosphorus Level


 


 


 1.9 mg/dL


(2.6-4.7) 





 


Test


 1/12/21


16:29 1/12/21


19:56 1/13/21


07:28 1/13/21


07:40


 


Glucose (Fingerstick)


 181 mg/dL


(70-99) 197 mg/dL


(70-99) 


 211 mg/dL


(70-99)


 


White Blood Count


 


 


 8.6 x10^3/uL


(4.0-11.0) 





 


Red Blood Count


 


 


 4.37 x10^6/uL


(4.30-5.70) 





 


Hemoglobin


 


 


 14.3 g/dL


(13.0-17.5) 





 


Hematocrit


 


 


 40.8 %


(39.0-53.0) 





 


Mean Corpuscular Volume   93 fL ()  


 


Mean Corpuscular Hemoglobin   33 pg (25-35)  


 


Mean Corpuscular Hemoglobin


Concent 


 


 35 g/dL


(31-37) 





 


Red Cell Distribution Width


 


 


 13.3 %


(11.5-14.5) 





 


Platelet Count


 


 


 275 x10^3/uL


(140-400) 





 


Neutrophils (%) (Auto)   63 % (31-73)  


 


Lymphocytes (%) (Auto)   27 % (24-48)  


 


Monocytes (%) (Auto)   8 % (0-9)  


 


Eosinophils (%) (Auto)   2 % (0-3)  


 


Basophils (%) (Auto)   1 % (0-3)  


 


Neutrophils # (Auto)


 


 


 5.5 x10^3/uL


(1.8-7.7) 





 


Lymphocytes # (Auto)


 


 


 2.3 x10^3/uL


(1.0-4.8) 





 


Monocytes # (Auto)


 


 


 0.7 x10^3/uL


(0.0-1.1) 





 


Eosinophils # (Auto)


 


 


 0.1 x10^3/uL


(0.0-0.7) 





 


Basophils # (Auto)


 


 


 0.1 x10^3/uL


(0.0-0.2) 





 


Sodium Level


 


 


 142 mmol/L


(136-145) 





 


Potassium Level


 


 


 3.3 mmol/L


(3.5-5.1) 





 


Chloride Level


 


 


 105 mmol/L


() 





 


Carbon Dioxide Level


 


 


 24 mmol/L


(21-32) 





 


Anion Gap   13 (6-14)  


 


Blood Urea Nitrogen   8 mg/dL (8-26)  


 


Creatinine


 


 


 1.1 mg/dL


(0.7-1.3) 





 


Estimated GFR


(Cockcroft-Gault) 


 


 72.1 


 





 


BUN/Creatinine Ratio   7 (6-20)  


 


Glucose Level


 


 


 209 mg/dL


(70-99) 





 


Calcium Level


 


 


 8.0 mg/dL


(8.5-10.1) 





 


Phosphorus Level


 


 


 1.9 mg/dL


(2.6-4.7) 





 


Total Bilirubin


 


 


 0.8 mg/dL


(0.2-1.0) 





 


Aspartate Amino Transf


(AST/SGOT) 


 


 34 U/L (15-37) 


 





 


Alanine Aminotransferase


(ALT/SGPT) 


 


 34 U/L (16-63) 


 





 


Alkaline Phosphatase


 


 


 63 U/L


() 





 


Total Protein


 


 


 7.1 g/dL


(6.4-8.2) 





 


Albumin


 


 


 3.4 g/dL


(3.4-5.0) 





 


Albumin/Globulin Ratio   0.9 (1.0-1.7)  


 


Test


 1/13/21


11:19 


 


 





 


Glucose (Fingerstick)


 221 mg/dL


(70-99) 


 


 











Laboratory Tests








Test


 1/12/21


16:29 1/12/21


19:56 1/13/21


07:28 1/13/21


07:40


 


Glucose (Fingerstick)


 181 mg/dL


(70-99) 197 mg/dL


(70-99) 


 211 mg/dL


(70-99)


 


White Blood Count


 


 


 8.6 x10^3/uL


(4.0-11.0) 





 


Red Blood Count


 


 


 4.37 x10^6/uL


(4.30-5.70) 





 


Hemoglobin


 


 


 14.3 g/dL


(13.0-17.5) 





 


Hematocrit


 


 


 40.8 %


(39.0-53.0) 





 


Mean Corpuscular Volume   93 fL ()  


 


Mean Corpuscular Hemoglobin   33 pg (25-35)  


 


Mean Corpuscular Hemoglobin


Concent 


 


 35 g/dL


(31-37) 





 


Red Cell Distribution Width


 


 


 13.3 %


(11.5-14.5) 





 


Platelet Count


 


 


 275 x10^3/uL


(140-400) 





 


Neutrophils (%) (Auto)   63 % (31-73)  


 


Lymphocytes (%) (Auto)   27 % (24-48)  


 


Monocytes (%) (Auto)   8 % (0-9)  


 


Eosinophils (%) (Auto)   2 % (0-3)  


 


Basophils (%) (Auto)   1 % (0-3)  


 


Neutrophils # (Auto)


 


 


 5.5 x10^3/uL


(1.8-7.7) 





 


Lymphocytes # (Auto)


 


 


 2.3 x10^3/uL


(1.0-4.8) 





 


Monocytes # (Auto)


 


 


 0.7 x10^3/uL


(0.0-1.1) 





 


Eosinophils # (Auto)


 


 


 0.1 x10^3/uL


(0.0-0.7) 





 


Basophils # (Auto)


 


 


 0.1 x10^3/uL


(0.0-0.2) 





 


Sodium Level


 


 


 142 mmol/L


(136-145) 





 


Potassium Level


 


 


 3.3 mmol/L


(3.5-5.1) 





 


Chloride Level


 


 


 105 mmol/L


() 





 


Carbon Dioxide Level


 


 


 24 mmol/L


(21-32) 





 


Anion Gap   13 (6-14)  


 


Blood Urea Nitrogen   8 mg/dL (8-26)  


 


Creatinine


 


 


 1.1 mg/dL


(0.7-1.3) 





 


Estimated GFR


(Cockcroft-Gault) 


 


 72.1 


 





 


BUN/Creatinine Ratio   7 (6-20)  


 


Glucose Level


 


 


 209 mg/dL


(70-99) 





 


Calcium Level


 


 


 8.0 mg/dL


(8.5-10.1) 





 


Phosphorus Level


 


 


 1.9 mg/dL


(2.6-4.7) 





 


Total Bilirubin


 


 


 0.8 mg/dL


(0.2-1.0) 





 


Aspartate Amino Transf


(AST/SGOT) 


 


 34 U/L (15-37) 


 





 


Alanine Aminotransferase


(ALT/SGPT) 


 


 34 U/L (16-63) 


 





 


Alkaline Phosphatase


 


 


 63 U/L


() 





 


Total Protein


 


 


 7.1 g/dL


(6.4-8.2) 





 


Albumin


 


 


 3.4 g/dL


(3.4-5.0) 





 


Albumin/Globulin Ratio   0.9 (1.0-1.7)  


 


Test


 1/13/21


11:19 


 


 





 


Glucose (Fingerstick)


 221 mg/dL


(70-99) 


 


 











Microbiology


1/11/21 Blood Culture - Preliminary, Resulted


          NO GROWTH AFTER 1 DAY


Medications





Current Medications


Sodium Chloride 1,000 ml @  1,000 mls/hr 1X  ONCE IV  Last administered on 

1/11/21at 11:29;  Start 1/11/21 at 11:30;  Stop 1/11/21 at 16:59;  Status DC


Ondansetron HCl (Zofran) 4 mg 1X  ONCE IVP  Last administered on 1/11/21at 

11:34;  Start 1/11/21 at 11:30;  Stop 1/11/21 at 11:31;  Status DC


Metoclopramide HCl (Reglan Vial) 10 mg 1X  ONCE IVP  Last administered on 

1/11/21at 13:20;  Start 1/11/21 at 12:30;  Stop 1/11/21 at 12:31;  Status DC


Famotidine (Pepcid Vial) 20 mg 1X  ONCE IVP  Last administered on 1/11/21at 

13:21;  Start 1/11/21 at 12:30;  Stop 1/11/21 at 12:31;  Status DC


Sodium Chloride 1,000 ml @  1,000 mls/hr 1X  ONCE IV  Last administered on 

1/11/21at 13:19;  Start 1/11/21 at 12:30;  Stop 1/11/21 at 16:59;  Status DC


Lorazepam (Ativan Inj) 1 mg 1X  ONCE IVP ;  Start 1/11/21 at 13:30;  Stop 1 /11/21 at 13:31;  Status Cancel


Lorazepam (Ativan Inj) 2 mg 1X  ONCE IVP  Last administered on 1/11/21at 13:18; 

Start 1/11/21 at 13:00;  Stop 1/11/21 at 13:09;  Status DC


Thiamine HCl 100 mg/Folic Acid 1 mg/Sodium Chloride 1,001.2 ml  @ 990.198 mls/hr

1X  ONCE IV  Last administered on 1/11/21at 13:47;  Start 1/11/21 at 14:00;  

Stop 1/11/21 at 15:00;  Status DC


Potassium Citrate (Urocit-K) 40 meq 1X  ONCE PO ;  Start 1/11/21 at 13:45;  Stop

1/11/21 at 13:46;  Status DC


Insulin Human Lispro (HumaLOG) 10 units 1X  ONCE SQ  Last administered on 

1/11/21at 13:40;  Start 1/11/21 at 13:45;  Stop 1/11/21 at 13:46;  Status DC


Thiamine HCl 100 mg/Folic Acid 1 mg/Sodium Chloride 1,001.2 ml  @ 99.012 mls/hr 

DAILY IV  Last administered on 1/13/21at 09:07;  Start 1/12/21 at 09:00;  Stop 

1/16/21 at 19:07


Multivitamins (Thera M Plus) 1 tab DAILY PO ;  Start 1/16/21 at 09:00


Folic Acid (Folic Acid) 1 mg DAILY PO ;  Start 1/16/21 at 09:00


Thiamine Mononitrate (Vitamin B-1) 100 mg DAILY PO ;  Start 1/16/21 at 09:00


Lorazepam (Ativan) 4 mg PRN Q1HR  PRN PO For CIWA 8-14;  Start 1/11/21 at 15:00


Lorazepam (Ativan) 8 mg PRN Q1HR  PRN PO For CIWA 15 or greater;  Start 1/11/21 

at 15:00


Lorazepam (Ativan Inj) 2 mg PRN Q1HR  PRN IV For CIWA 8-14 Last administered on 

1/11/21at 17:12;  Start 1/11/21 at 15:00


Lorazepam (Ativan Inj) 4 mg PRN Q1HR  PRN IV For CIWA 15 or greater;  Start 

1/11/21 at 15:00


Haloperidol Lactate (Haldol Inj) 5 mg PRN Q4HRS  PRN IVP 

Hallucinatns,Confusn,Delirium;  Start 1/11/21 at 15:00


Diphenhydramine HCl (Benadryl) 25 mg PRN Q15MIN  PRN IVP EPS symptoms 2'Haldol 

admin;  Start 1/11/21 at 15:00


Clonidine HCl (Catapres) 0.1 mg PRN Q1HR  PRN PO SBP > 180 or DBP > 100, MRX3;  

Start 1/11/21 at 15:00


Lorazepam (Ativan Inj) 2 mg PRN Q15MIN  PRN IV SEE COMMENTS;  Start 1/11/21 at 

15:00;  Stop 1/12/21 at 18:20;  Status DC


Lorazepam (Ativan Inj) 4 mg PRN Q15MIN  PRN IV SEE COMMENTS;  Start 1/11/21 at 

15:00;  Stop 1/12/21 at 18:20;  Status DC


Sodium Chloride 1,000 ml @  1,000 mls/hr Q1H IV ;  Start 1/11/21 at 15:00;  Stop

1/11/21 at 15:59;  Status DC


Sodium Chloride 1,000 ml @  500 mls/hr Q2H IV ;  Start 1/11/21 at 15:00;  Stop 

1/11/21 at 16:53;  Status DC


Sodium Chloride 1,000 ml @  250 mls/hr Q4H IV  Last administered on 1/11/21at 

15:41;  Start 1/11/21 at 15:00;  Stop 1/11/21 at 16:53;  Status DC


Sodium Chloride 1,000 ml @  250 mls/hr Q4H IV ;  Start 1/11/21 at 15:00;  Stop 

1/11/21 at 16:53;  Status DC


Dextrose/Sodium Chloride 1,000 ml @  250 mls/hr Q4H IV  Last administered on 

1/13/21at 03:45;  Start 1/11/21 at 15:00


Insulin Human Regular 100 unit/ Sodium Chloride 101 ml @ 0 mls/hr CONT PRN  PRN 

IV PER PROTOCOL;  Start 1/11/21 at 15:00;  Stop 1/11/21 at 15:08;  Status DC


Potassium Chloride/Water 100 ml @  100 mls/hr PRN Q1HR  PRN IV SEE COMMENTS;  

Start 1/11/21 at 15:00


Potassium Chloride/Water 100 ml @  100 mls/hr PRN Q1HR  PRN IV SEE COMMENTS;  

Start 1/11/21 at 15:00


Potassium Chloride/Water 100 ml @  100 mls/hr PRN Q1HR  PRN IV SEE COMMENTS;  

Start 1/11/21 at 15:00


Sodium Chloride 1,000 ml @  1,000 mls/hr Q1H IV ;  Start 1/11/21 at 15:00;  Stop

1/11/21 at 15:13;  Status DC


Sodium Chloride 1,000 ml @  500 mls/hr Q2H IV ;  Start 1/11/21 at 15:00;  Stop 

1/11/21 at 15:13;  Status DC


Sodium Chloride 1,000 ml @  250 mls/hr Q4H IV ;  Start 1/11/21 at 15:00;  Stop 

1/11/21 at 15:14;  Status DC


Insulin Human Regular 100 unit/ Sodium Chloride 101 ml @ 0 mls/hr CONT PRN  PRN 

IV PER PROTOCOL Last administered on 1/11/21at 15:42;  Start 1/11/21 at 15:00


Potassium Chloride/Water 100 ml @  100 mls/hr PRN Q1HR  PRN IV SEE COMMENTS;  

Start 1/11/21 at 15:00;  Status UNV


Potassium Chloride/Water 100 ml @  100 mls/hr PRN Q1HR  PRN IV SEE COMMENTS;  

Start 1/11/21 at 15:00;  Status UNV


Potassium Chloride/Water 100 ml @  100 mls/hr PRN Q1HR  PRN IV SEE COMMENTS;  

Start 1/11/21 at 15:00;  Status UNV


Magnesium Sulfate 100 ml @  25 mls/hr DAILY IV  Last administered on 1/13/21at 

08:12;  Start 1/11/21 at 17:00;  Stop 1/15/21 at 16:59


Sodium Bicarbonate 50 meq/Sodium Chloride 1,050 ml @  500 mls/hr Q2H6M PRN IV 

SEE COMMENTS;  Start 1/11/21 at 15:00;  Stop 1/11/21 at 15:20;  Status DC


Ondansetron HCl (Zofran) 4 mg PRN Q4HRS  PRN IV NAUSEA/VOMITING;  Start 1/11/21 

at 15:00


Acetaminophen (Tylenol) 650 mg PRN Q4HRS  PRN PO TEMP OVER 100.4F OR MILD PAIN 

Last administered on 1/12/21at 20:08;  Start 1/11/21 at 15:00


Al Hydroxide/Mg Hydroxide (Mylanta Plus Xs) 30 ml PRN DAILY  PRN PO HEARTBURN / 

GAS;  Start 1/11/21 at 15:00


Clonidine HCl (Catapres) 0.1 mg PRN Q6HRS  PRN PO SBP>160 OR DBP>90;  Start 

1/11/21 at 15:00;  Stop 1/11/21 at 15:08;  Status DC


Sodium Monofluorophosphate (Fleet Adult) 133 ml PRN DAILY  PRN NE CONSTIPATION; 

Start 1/11/21 at 15:00


Albuterol Sulfate (Ventolin Neb Soln) 2.5 mg PRN Q4HRS  PRN NEB SHORTNESS OF 

BREATH;  Start 1/11/21 at 15:00


Enoxaparin Sodium (Lovenox 40mg Syringe) 40 mg Q24H SQ  Last administered on 

1/12/21at 19:57;  Start 1/11/21 at 21:00


Labetalol HCl (Normodyne Iv Push) 20 mg PRN Q2HR  PRN IVP HYPERTENSION Last 

administered on 1/13/21at 08:13;  Start 1/11/21 at 15:15


Sodium Bicarbonate 50 meq/Sodium Chloride 1,050 ml @  500 mls/hr PRN Q2HRS  PRN 

IV DKA PROTOCOL;  Start 1/11/21 at 15:30;  Stop 1/11/21 at 16:53;  Status DC


Insulin Human Regular 100 ml @  As Directed STK-MED ONCE IV ;  Start 1/11/21 at 

15:31;  Stop 1/11/21 at 15:31;  Status DC


Magnesium Sulfate 100 ml @  25 mls/hr DAILY IV ;  Start 1/12/21 at 09:00;  Stop 

1/11/21 at 15:40;  Status DC


Potassium Chloride/Water 100 ml @  100 mls/hr Q1H IV  Last administered on 

1/11/21at 20:32;  Start 1/11/21 at 17:00;  Stop 1/11/21 at 20:59;  Status DC


Pantoprazole Sodium (PROTONIX VIAL for IV PUSH) 40 mg DAILYAC IVP  Last 

administered on 1/12/21at 08:20;  Start 1/11/21 at 17:00;  Stop 1/12/21 at 

10:42;  Status DC


Dextrose/Sodium Chloride 1,000 ml @  250 mls/hr Q4H IV ;  Start 1/11/21 at 

17:00;  Status UNV


Insulin Glargine (Lantus Syringe) 10 unit 1X  ONCE SQ  Last administered on 

1/11/21at 21:43;  Start 1/11/21 at 21:15;  Stop 1/11/21 at 21:20;  Status DC


Insulin Human Lispro (HumaLOG) 0-5 UNITS TIDWMEALS SQ  Last administered on 

1/13/21at 11:58;  Start 1/12/21 at 08:00


Dextrose (Dextrose 50%-Water Syringe) 12.5 gm PRN Q15MIN  PRN IV SEE COMMENTS;  

Start 1/11/21 at 21:15


Magnesium Sulfate 50 ml @ 25 mls/hr 1X  ONCE IV  Last administered on 1/11/21at 

21:50;  Start 1/11/21 at 21:30;  Stop 1/11/21 at 23:29;  Status DC


Pantoprazole Sodium (Protonix) 40 mg DAILYAC PO  Last administered on 1/13/21at 

08:22;  Start 1/13/21 at 07:30


Potassium Bicarbonate (Potassium Effervescent Tablet) 40 meq 1X  ONCE PO  Last 

administered on 1/12/21at 15:14;  Start 1/12/21 at 14:15;  Stop 1/12/21 at 

14:16;  Status DC


Potassium Bicarbonate (Potassium Effervescent Tablet) 40 meq 1X  ONCE FT ;  

Start 1/12/21 at 14:15;  Stop 1/12/21 at 14:16;  Status UNV


Magnesium Sulfate 50 ml @ 25 mls/hr Q24H IV ;  Start 1/12/21 at 14:15;  Stop 

1/14/21 at 16:14;  Status UNV


Potassium Phos/ Sodium Phos (Phos-Nak) 1 pkt BID PO ;  Start 1/12/21 at 21:00;  

Stop 1/13/21 at 09:01;  Status UNV


Potassium Bicarbonate (Potassium Effervescent Tablet) 40 meq Q4H PO ;  Start 

1/12/21 at 14:15;  Stop 1/12/21 at 18:16;  Status UNV


Info (Non-Icu Electrolyte Protocol) 1 ea CONT PRN  PRN MC SEE COMMENTS;  Start 

1/12/21 at 14:30


Atorvastatin Calcium (Lipitor) 20 mg HS PO ;  Start 1/13/21 at 21:00


Benzonatate (Tessalon Perle) 100 mg BID PO  Last administered on 1/13/21at 

11:55;  Start 1/13/21 at 12:00


Fenofibrate (Lofibra) 134 mg DAILY PO  Last administered on 1/13/21at 11:55;  

Start 1/13/21 at 12:00


Metoprolol Succinate (Toprol Xl) 50 mg BID PO  Last administered on 1/13/21at 

11:55;  Start 1/13/21 at 12:00


Paroxetine HCl (Paxil) 20 mg HS PO ;  Start 1/13/21 at 21:00


Losartan Potassium (Cozaar) 100 mg HS PO ;  Start 1/13/21 at 21:00


Non-Formulary Medication (Omeprazole ) 1 cap DAILY PO ;  Start 1/14/21 at 09:00;

 Status UNV





Active Scripts


Active


Reported


Farxiga (Dapagliflozin Propanediol) 10 Mg Tablet 10 Mg PO 


Atorvastatin Calcium 20 Mg Tablet 1 Tab PO HS


Losartan Potassium 100 Mg Tablet 100 Mg PO HS


Paroxetine Hcl 20 Mg Tablet 1 Tab PO HS


Fenofibrate (Fenofibrate,Micronized) 134 Mg Capsule 1 Cap PO DAILY


Benzonatate 100 Mg Capsule 1 Cap PO BID


Metoprolol Succinate ( Xl ) (Metoprolol Succinate) 25 Mg Tab.er.24h 2 Tab PO BID


Omeprazole 20 Mg Capsule.dr 1 Cap PO DAILY


Vitals/I & O





Vital Sign - Last 24 Hours








 1/12/21 1/12/21 1/12/21 1/12/21





 15:13 19:00 20:00 22:39


 


Temp 98.6 97.8  97.8





 98.6 97.8  97.8


 


Pulse 117 99  102


 


Resp 20 18  18


 


B/P (MAP) 144/89 (107) 160/103 (122)  167/93 (117)


 


Pulse Ox 96 97  97


 


O2 Delivery Room Air Room Air Room Air Room Air


 


    





    





 1/13/21 1/13/21 1/13/21 1/13/21





 02:46 07:00 08:00 08:13


 


Temp 97.9 98.3  





 97.9 98.3  


 


Pulse 85 91  110


 


Resp 18 21  


 


B/P (MAP) 167/100 (122) 151/106 (121)  151/106


 


Pulse Ox 99 97  


 


O2 Delivery Room Air Room Air Room Air 


 


    





    





 1/13/21 1/13/21  





 11:00 11:55  


 


Temp 98.3   





 98.3   


 


Pulse 89 102  


 


Resp 20   


 


B/P (MAP) 166/95 (118) 166/95  


 


Pulse Ox 97   


 


O2 Delivery Room Air   














Intake and Output   


 


 1/12/21 1/12/21 1/13/21





 15:00 23:00 07:00


 


Intake Total 500 ml 1300 ml 1120 ml


 


Output Total 850 ml 1925 ml 600 ml


 


Balance -350 ml -625 ml 520 ml











Justicifation of Admission Dx:


Justifications for Admission:


Justification of Admission Dx:  Yes











MJ KNOWLES MD          Jan 13, 2021 12:36

## 2021-01-13 NOTE — DISCH
DISCHARGE INSTRUCTIONS


Condition on Discharge


Condition on Discharge:  Stable





Activity After Discharge


Activity Instructions for Disc:  No restrictions


Driving Instructions after Dis:  Do not drive today





Diet after Discharge


Diet after Discharge:  Diabetic No Calorie Level


Liquid Texture:  Thin Liquid





Contacting the DR. after DC


Call your doctor for:  If your condition worsens





Follow-Up


Follow up with:  PCP SOON


Follow Up With:  CONSIDER ATTENDING AA





Treatment/Equipment after DC


Adaptive Equipment Issued:  None











MJ KNOWLES MD          Jan 13, 2021 13:49

## 2021-01-13 NOTE — NUR
SW following for discharge planning. SW spoke with RN and reviewed chart. Pt discharged home 
today, self-care. No further SW needs at this time.

## 2021-01-13 NOTE — NUR
Pt dc'd home with family member by private vehicle. Ambulated to car. Has wallet and cell 
phone did not bring . Pts pharmacy had rec'd the orders from Dr Cormier and pt 
aware of new prescriptions. denied any discomfort and stated he was prepared to go home 
feeling better. IV dc'd with tip intact.

## 2021-01-13 NOTE — PDOC3
Discharge Summary


Date of Admission:  Jan 11, 2021


Date of Discharge:  Jan 13, 2021


Follow-Up:  3-5 days


Admitting Diagnosis comment:





Assessment/Plan


Assessment/Plan


==========================================discharge dx 

===============================================





1. HX ALCOHOL ABUSE WITH Acute withdrawal symptoms


2. uncontrolled diabetes, nonketotic hyperosmolar hyperglycemia


3. hypokalemia


4. NONCOMPLIANCE 


5. HYPOKALEMIA ON REPLACEMENT RC


6. acute gastroenteritis , vomiting resolved











plan=============








ADMIT


ADAT ADA 


IV FLUIDS


CIWA PROTOCOL


INSULIN DRIP


THIAMINE


FOLIC ACID


Alcohol cessation discussed


DVT PROPHYLAXIS


[facility] outpatient EGD.














D/C PLANNING 34 MIN











D/W RN    likely had food poisoning.  Much better now.  OK with gi  to go home 

on prior meds.  [facility] EGD re: chronic GERD--.[facility]?





History of Present Illness


History of Present Illness





Identification/Chief Complaint


Chief Complaint





seen in er with intractable vomiting, alcohol withdrawal symptoms 45  year old 

presents to the emergency department complaining of nausea and vomiting after dr

inking last Saturday night. 





woke up Sunday morning with hot flashes and started vomiting.  Patient states he

vomited approximately 15 times in the last 24 hours, patient states that he 

notices water and yellow which vomitus without blood.  








glucose elevated at 449  in ER   states he is a type II diabetic although he 

does not check his blood sugars often stating that he checked his blood sugar 

last month that has been fine lately





.  Patient denies any abdominal pains, chest pains, shortness of breath.  

Patient denies any increased thirst or increased urination.  Patient states he 

has been in DKA in the past





Past Medical History


Past Medical History:  Anxiety, Diabetes-Type II, GERD, High Cholesterol, 

Hypertension


Past Surgical History:  Other


Additional Past Surgical Histo:  right jaw; right foot; right knee


Smoking Status:  Former Smoker


Alcohol Use:  Occasionally


Drug Use:  Marijuana








PAST MEDICAL HISTORY:  Significant for history of diabetes and hypertension.





PAST SURGICAL HISTORY:  Joint surgery.





FAMILY HISTORY:  Hypertension.





SOCIAL HISTORY:  Smoked for about 10 years and history of alcohol abuse.





ALLERGIES:  None.











fhx copd


Cardiovascular:  HTN


GI:  Gastritis


Heme/Onc:  No pertinent hx


Psych:  Anxiety, Addictions


Endocrine:  Diabetes





Family History


Family History:  Alcohol Abuse, Hypertension





Social History


Smoke:  No


ALCOHOL:  heavy


Drugs:  None


FINAL DIAGNOSIS


Problems


Medical Problems:


(1) Alcohol withdrawal syndrome


Status: Acute  





(2) Low serum potassium level


Status: Acute  





(3) Uncontrolled diabetes mellitus


Status: Acute  








Brief Hospital Course


Mr. Kirby  is a 45 old [sex] who presented with [ ALCOHOL WITHDRAWAL]


CONDITION AT DISCHARGE:  Improved


Discharge Medications





Current Medications


Sodium Chloride 1,000 ml @  1,000 mls/hr 1X  ONCE IV  Last administered on 

1/11/21at 11:29;  Start 1/11/21 at 11:30;  Stop 1/11/21 at 16:59;  Status DC


Ondansetron HCl (Zofran) 4 mg 1X  ONCE IVP  Last administered on 1/11/21at 

11:34;  Start 1/11/21 at 11:30;  Stop 1/11/21 at 11:31;  Status DC


Metoclopramide HCl (Reglan Vial) 10 mg 1X  ONCE IVP  Last administered on 

1/11/21at 13:20;  Start 1/11/21 at 12:30;  Stop 1/11/21 at 12:31;  Status DC


Famotidine (Pepcid Vial) 20 mg 1X  ONCE IVP  Last administered on 1/11/21at 

13:21;  Start 1/11/21 at 12:30;  Stop 1/11/21 at 12:31;  Status DC


Sodium Chloride 1,000 ml @  1,000 mls/hr 1X  ONCE IV  Last administered on 

1/11/21at 13:19;  Start 1/11/21 at 12:30;  Stop 1/11/21 at 16:59;  Status DC


Lorazepam (Ativan Inj) 1 mg 1X  ONCE IVP ;  Start 1/11/21 at 13:30;  Stop 

1/11/21 at 13:31;  Status Cancel


Lorazepam (Ativan Inj) 2 mg 1X  ONCE IVP  Last administered on 1/11/21at 13:18; 

Start 1/11/21 at 13:00;  Stop 1/11/21 at 13:09;  Status DC


Thiamine HCl 100 mg/Folic Acid 1 mg/Sodium Chloride 1,001.2 ml  @ 990.198 mls/hr

1X  ONCE IV  Last administered on 1/11/21at 13:47;  Start 1/11/21 at 14:00;  

Stop 1/11/21 at 15:00;  Status DC


Potassium Citrate (Urocit-K) 40 meq 1X  ONCE PO ;  Start 1/11/21 at 13:45;  Stop

1/11/21 at 13:46;  Status DC


Insulin Human Lispro (HumaLOG) 10 units 1X  ONCE SQ  Last administered on 

1/11/21at 13:40;  Start 1/11/21 at 13:45;  Stop 1/11/21 at 13:46;  Status DC


Thiamine HCl 100 mg/Folic Acid 1 mg/Sodium Chloride 1,001.2 ml  @ 99.012 mls/hr 

DAILY IV  Last administered on 1/13/21at 09:07;  Start 1/12/21 at 09:00;  Stop 

1/16/21 at 19:07


Multivitamins (Thera M Plus) 1 tab DAILY PO ;  Start 1/16/21 at 09:00


Folic Acid (Folic Acid) 1 mg DAILY PO ;  Start 1/16/21 at 09:00


Thiamine Mononitrate (Vitamin B-1) 100 mg DAILY PO ;  Start 1/16/21 at 09:00


Lorazepam (Ativan) 4 mg PRN Q1HR  PRN PO For CIWA 8-14;  Start 1/11/21 at 15:00


Lorazepam (Ativan) 8 mg PRN Q1HR  PRN PO For CIWA 15 or greater;  Start 1/11/21 

at 15:00


Lorazepam (Ativan Inj) 2 mg PRN Q1HR  PRN IV For CIWA 8-14 Last administered on 

1/11/21at 17:12;  Start 1/11/21 at 15:00


Lorazepam (Ativan Inj) 4 mg PRN Q1HR  PRN IV For CIWA 15 or greater;  Start 

1/11/21 at 15:00


Haloperidol Lactate (Haldol Inj) 5 mg PRN Q4HRS  PRN IVP Hallucinatns,Conf

usn,Delirium;  Start 1/11/21 at 15:00


Diphenhydramine HCl (Benadryl) 25 mg PRN Q15MIN  PRN IVP EPS symptoms 2'Haldol 

admin;  Start 1/11/21 at 15:00


Clonidine HCl (Catapres) 0.1 mg PRN Q1HR  PRN PO SBP > 180 or DBP > 100, MRX3;  

Start 1/11/21 at 15:00


Lorazepam (Ativan Inj) 2 mg PRN Q15MIN  PRN IV SEE COMMENTS;  Start 1/11/21 at 

15:00;  Stop 1/12/21 at 18:20;  Status DC


Lorazepam (Ativan Inj) 4 mg PRN Q15MIN  PRN IV SEE COMMENTS;  Start 1/11/21 at 

15:00;  Stop 1/12/21 at 18:20;  Status DC


Sodium Chloride 1,000 ml @  1,000 mls/hr Q1H IV ;  Start 1/11/21 at 15:00;  Stop

1/11/21 at 15:59;  Status DC


Sodium Chloride 1,000 ml @  500 mls/hr Q2H IV ;  Start 1/11/21 at 15:00;  Stop 

1/11/21 at 16:53;  Status DC


Sodium Chloride 1,000 ml @  250 mls/hr Q4H IV  Last administered on 1/11/21at 

15:41;  Start 1/11/21 at 15:00;  Stop 1/11/21 at 16:53;  Status DC


Sodium Chloride 1,000 ml @  250 mls/hr Q4H IV ;  Start 1/11/21 at 15:00;  Stop 

1/11/21 at 16:53;  Status DC


Dextrose/Sodium Chloride 1,000 ml @  250 mls/hr Q4H IV  Last administered on 

1/13/21at 03:45;  Start 1/11/21 at 15:00


Insulin Human Regular 100 unit/ Sodium Chloride 101 ml @ 0 mls/hr CONT PRN  PRN 

IV PER PROTOCOL;  Start 1/11/21 at 15:00;  Stop 1/11/21 at 15:08;  Status DC


Potassium Chloride/Water 100 ml @  100 mls/hr PRN Q1HR  PRN IV SEE COMMENTS;  

Start 1/11/21 at 15:00


Potassium Chloride/Water 100 ml @  100 mls/hr PRN Q1HR  PRN IV SEE COMMENTS;  

Start 1/11/21 at 15:00


Potassium Chloride/Water 100 ml @  100 mls/hr PRN Q1HR  PRN IV SEE COMMENTS;  

Start 1/11/21 at 15:00


Sodium Chloride 1,000 ml @  1,000 mls/hr Q1H IV ;  Start 1/11/21 at 15:00;  Stop

1/11/21 at 15:13;  Status DC


Sodium Chloride 1,000 ml @  500 mls/hr Q2H IV ;  Start 1/11/21 at 15:00;  Stop 

1/11/21 at 15:13;  Status DC


Sodium Chloride 1,000 ml @  250 mls/hr Q4H IV ;  Start 1/11/21 at 15:00;  Stop 

1/11/21 at 15:14;  Status DC


Insulin Human Regular 100 unit/ Sodium Chloride 101 ml @ 0 mls/hr CONT PRN  PRN 

IV PER PROTOCOL Last administered on 1/11/21at 15:42;  Start 1/11/21 at 15:00


Potassium Chloride/Water 100 ml @  100 mls/hr PRN Q1HR  PRN IV SEE COMMENTS;  

Start 1/11/21 at 15:00;  Status UNV


Potassium Chloride/Water 100 ml @  100 mls/hr PRN Q1HR  PRN IV SEE COMMENTS;  

Start 1/11/21 at 15:00;  Status UNV


Potassium Chloride/Water 100 ml @  100 mls/hr PRN Q1HR  PRN IV SEE COMMENTS;  

Start 1/11/21 at 15:00;  Status UNV


Magnesium Sulfate 100 ml @  25 mls/hr DAILY IV  Last administered on 1/13/21at 

08:12;  Start 1/11/21 at 17:00;  Stop 1/15/21 at 16:59


Sodium Bicarbonate 50 meq/Sodium Chloride 1,050 ml @  500 mls/hr Q2H6M PRN IV 

SEE COMMENTS;  Start 1/11/21 at 15:00;  Stop 1/11/21 at 15:20;  Status DC


Ondansetron HCl (Zofran) 4 mg PRN Q4HRS  PRN IV NAUSEA/VOMITING;  Start 1/11/21 

at 15:00


Acetaminophen (Tylenol) 650 mg PRN Q4HRS  PRN PO TEMP OVER 100.4F OR MILD PAIN 

Last administered on 1/12/21at 20:08;  Start 1/11/21 at 15:00


Al Hydroxide/Mg Hydroxide (Mylanta Plus Xs) 30 ml PRN DAILY  PRN PO HEARTBURN / 

GAS;  Start 1/11/21 at 15:00


Clonidine HCl (Catapres) 0.1 mg PRN Q6HRS  PRN PO SBP>160 OR DBP>90;  Start 1/1 1/21 at 15:00;  Stop 1/11/21 at 15:08;  Status DC


Sodium Monofluorophosphate (Fleet Adult) 133 ml PRN DAILY  PRN VT CONSTIPATION; 

Start 1/11/21 at 15:00


Albuterol Sulfate (Ventolin Neb Soln) 2.5 mg PRN Q4HRS  PRN NEB SHORTNESS OF 

BREATH;  Start 1/11/21 at 15:00


Enoxaparin Sodium (Lovenox 40mg Syringe) 40 mg Q24H SQ  Last administered on 

1/12/21at 19:57;  Start 1/11/21 at 21:00


Labetalol HCl (Normodyne Iv Push) 20 mg PRN Q2HR  PRN IVP HYPERTENSION Last 

administered on 1/13/21at 08:13;  Start 1/11/21 at 15:15


Sodium Bicarbonate 50 meq/Sodium Chloride 1,050 ml @  500 mls/hr PRN Q2HRS  PRN 

IV DKA PROTOCOL;  Start 1/11/21 at 15:30;  Stop 1/11/21 at 16:53;  Status DC


Insulin Human Regular 100 ml @  As Directed STK-MED ONCE IV ;  Start 1/11/21 at 

15:31;  Stop 1/11/21 at 15:31;  Status DC


Magnesium Sulfate 100 ml @  25 mls/hr DAILY IV ;  Start 1/12/21 at 09:00;  Stop 

1/11/21 at 15:40;  Status DC


Potassium Chloride/Water 100 ml @  100 mls/hr Q1H IV  Last administered on 

1/11/21at 20:32;  Start 1/11/21 at 17:00;  Stop 1/11/21 at 20:59;  Status DC


Pantoprazole Sodium (PROTONIX VIAL for IV PUSH) 40 mg DAILYAC IVP  Last 

administered on 1/12/21at 08:20;  Start 1/11/21 at 17:00;  Stop 1/12/21 at 

10:42;  Status DC


Dextrose/Sodium Chloride 1,000 ml @  250 mls/hr Q4H IV ;  Start 1/11/21 at 

17:00;  Status UNV


Insulin Glargine (Lantus Syringe) 10 unit 1X  ONCE SQ  Last administered on 

1/11/21at 21:43;  Start 1/11/21 at 21:15;  Stop 1/11/21 at 21:20;  Status DC


Insulin Human Lispro (HumaLOG) 0-5 UNITS TIDWMEALS SQ  Last administered on 

1/13/21at 11:58;  Start 1/12/21 at 08:00


Dextrose (Dextrose 50%-Water Syringe) 12.5 gm PRN Q15MIN  PRN IV SEE COMMENTS;  

Start 1/11/21 at 21:15


Magnesium Sulfate 50 ml @ 25 mls/hr 1X  ONCE IV  Last administered on 1/11/21at 

21:50;  Start 1/11/21 at 21:30;  Stop 1/11/21 at 23:29;  Status DC


Pantoprazole Sodium (Protonix) 40 mg DAILYAC PO  Last administered on 1/13/21at 

08:22;  Start 1/13/21 at 07:30


Potassium Bicarbonate (Potassium Effervescent Tablet) 40 meq 1X  ONCE PO  Last 

administered on 1/12/21at 15:14;  Start 1/12/21 at 14:15;  Stop 1/12/21 at 

14:16;  Status DC


Potassium Bicarbonate (Potassium Effervescent Tablet) 40 meq 1X  ONCE FT ;  

Start 1/12/21 at 14:15;  Stop 1/12/21 at 14:16;  Status UNV


Magnesium Sulfate 50 ml @ 25 mls/hr Q24H IV ;  Start 1/12/21 at 14:15;  Stop 

1/14/21 at 16:14;  Status UNV


Potassium Phos/ Sodium Phos (Phos-Nak) 1 pkt BID PO ;  Start 1/12/21 at 21:00;  

Stop 1/13/21 at 09:01;  Status UNV


Potassium Bicarbonate (Potassium Effervescent Tablet) 40 meq Q4H PO ;  Start 

1/12/21 at 14:15;  Stop 1/12/21 at 18:16;  Status UNV


Info (Non-Icu Electrolyte Protocol) 1 ea CONT PRN  PRN MC SEE COMMENTS;  Start 

1/12/21 at 14:30


Atorvastatin Calcium (Lipitor) 20 mg HS PO ;  Start 1/13/21 at 21:00


Benzonatate (Tessalon Perle) 100 mg BID PO  Last administered on 1/13/21at 

11:55;  Start 1/13/21 at 12:00


Fenofibrate (Lofibra) 134 mg DAILY PO  Last administered on 1/13/21at 11:55;  

Start 1/13/21 at 12:00


Metoprolol Succinate (Toprol Xl) 50 mg BID PO  Last administered on 1/13/21at 

11:55;  Start 1/13/21 at 12:00


Paroxetine HCl (Paxil) 20 mg HS PO ;  Start 1/13/21 at 21:00


Losartan Potassium (Cozaar) 100 mg HS PO ;  Start 1/13/21 at 21:00


Non-Formulary Medication (Omeprazole ) 1 cap DAILY PO ;  Start 1/14/21 at 09:00;

 Status UNV





Active Scripts


Active


Reported


Farxiga (Dapagliflozin Propanediol) 10 Mg Tablet 10 Mg PO 


Atorvastatin Calcium 20 Mg Tablet 1 Tab PO HS


Losartan Potassium 100 Mg Tablet 100 Mg PO HS


Paroxetine Hcl 20 Mg Tablet 1 Tab PO HS


Fenofibrate (Fenofibrate,Micronized) 134 Mg Capsule 1 Cap PO DAILY


Benzonatate 100 Mg Capsule 1 Cap PO BID


Metoprolol Succinate ( Xl ) (Metoprolol Succinate) 25 Mg Tab.er.24h 2 Tab PO BID


Omeprazole 20 Mg Capsule.dr 1 Cap PO DAILY


Vital Signs





Vital Signs








  Date Time  Temp Pulse Resp B/P (MAP) Pulse Ox O2 Delivery O2 Flow Rate FiO2


 


1/13/21 11:55  102  166/95    


 


1/13/21 11:00 98.3  20  97 Room Air  





 98.3       








Labs





Laboratory Tests








Test


 1/11/21


13:39 1/11/21


15:48 1/11/21


16:32 1/11/21


17:05


 


Glucose (Fingerstick)


 357 mg/dL


(70-99) 


 180 mg/dL


(70-99) 





 


Sodium Level


 


 144 mmol/L


(136-145) 


 





 


Potassium Level


 


 3.1 mmol/L


(3.5-5.1) 


 





 


Chloride Level


 


 105 mmol/L


() 


 





 


Carbon Dioxide Level


 


 21 mmol/L


(21-32) 


 





 


Anion Gap  18 (6-14)   


 


Blood Urea Nitrogen


 


 25 mg/dL


(8-26) 


 





 


Creatinine


 


 1.7 mg/dL


(0.7-1.3) 


 





 


Estimated GFR


(Cockcroft-Gault) 


 43.6 


 


 





 


Glucose Level


 


 224 mg/dL


(70-99) 


 





 


Calcium Level


 


 8.3 mg/dL


(8.5-10.1) 


 





 


Phosphorus Level


 


 2.5 mg/dL


(2.6-4.7) 


 





 


Magnesium Level


 


 0.3 mg/dL


(1.8-2.4) 


 





 


Lactic Acid Level


 


 


 


 3.4 mmol/L


(0.4-2.0)


 


Test


 1/11/21


17:39 1/11/21


18:45 1/11/21


19:45 1/11/21


20:08


 


Glucose (Fingerstick)


 202 mg/dL


(70-99) 200 mg/dL


(70-99) 154 mg/dL


(70-99) 





 


Sodium Level


 


 


 


 144 mmol/L


(136-145)


 


Potassium Level


 


 


 


 3.1 mmol/L


(3.5-5.1)


 


Chloride Level


 


 


 


 107 mmol/L


()


 


Carbon Dioxide Level


 


 


 


 24 mmol/L


(21-32)


 


Anion Gap    13 (6-14) 


 


Blood Urea Nitrogen


 


 


 


 21 mg/dL


(8-26)


 


Creatinine


 


 


 


 1.2 mg/dL


(0.7-1.3)


 


Estimated GFR


(Cockcroft-Gault) 


 


 


 65.2 





 


Glucose Level


 


 


 


 139 mg/dL


(70-99)


 


Calcium Level


 


 


 


 8.2 mg/dL


(8.5-10.1)


 


Phosphorus Level


 


 


 


 2.8 mg/dL


(2.6-4.7)


 


Magnesium Level


 


 


 


 1.6 mg/dL


(1.8-2.4)


 


Test


 1/11/21


21:01 1/11/21


22:13 1/12/21


07:03 1/12/21


08:52


 


Glucose (Fingerstick)


 138 mg/dL


(70-99) 103 mg/dL


(70-99) 198 mg/dL


(70-99) 





 


White Blood Count


 


 


 


 16.1 x10^3/uL


(4.0-11.0)


 


Red Blood Count


 


 


 


 4.24 x10^6/uL


(4.30-5.70)


 


Hemoglobin


 


 


 


 13.5 g/dL


(13.0-17.5)


 


Hematocrit


 


 


 


 39.8 %


(39.0-53.0)


 


Mean Corpuscular Volume    94 fL () 


 


Mean Corpuscular Hemoglobin    32 pg (25-35) 


 


Mean Corpuscular Hemoglobin


Concent 


 


 


 34 g/dL


(31-37)


 


Red Cell Distribution Width


 


 


 


 13.2 %


(11.5-14.5)


 


Platelet Count


 


 


 


 300 x10^3/uL


(140-400)


 


Neutrophils (%) (Auto)    82 % (31-73) 


 


Lymphocytes (%) (Auto)    10 % (24-48) 


 


Monocytes (%) (Auto)    8 % (0-9) 


 


Eosinophils (%) (Auto)    0 % (0-3) 


 


Basophils (%) (Auto)    0 % (0-3) 


 


Neutrophils # (Auto)


 


 


 


 13.2 x10^3/uL


(1.8-7.7)


 


Lymphocytes # (Auto)


 


 


 


 1.6 x10^3/uL


(1.0-4.8)


 


Monocytes # (Auto)


 


 


 


 1.2 x10^3/uL


(0.0-1.1)


 


Eosinophils # (Auto)


 


 


 


 0.1 x10^3/uL


(0.0-0.7)


 


Basophils # (Auto)


 


 


 


 0.0 x10^3/uL


(0.0-0.2)


 


Sodium Level


 


 


 


 142 mmol/L


(136-145)


 


Potassium Level


 


 


 


 3.2 mmol/L


(3.5-5.1)


 


Chloride Level


 


 


 


 106 mmol/L


()


 


Carbon Dioxide Level


 


 


 


 22 mmol/L


(21-32)


 


Anion Gap    14 (6-14) 


 


Blood Urea Nitrogen


 


 


 


 13 mg/dL


(8-26)


 


Creatinine


 


 


 


 1.1 mg/dL


(0.7-1.3)


 


Estimated GFR


(Cockcroft-Gault) 


 


 


 72.1 





 


Glucose Level


 


 


 


 252 mg/dL


(70-99)


 


Calcium Level


 


 


 


 7.9 mg/dL


(8.5-10.1)


 


Phosphorus Level


 


 


 


 1.9 mg/dL


(2.6-4.7)


 


Test


 1/12/21


11:26 1/12/21


16:29 1/12/21


19:56 1/13/21


07:28


 


Glucose (Fingerstick)


 252 mg/dL


(70-99) 181 mg/dL


(70-99) 197 mg/dL


(70-99) 





 


White Blood Count


 


 


 


 8.6 x10^3/uL


(4.0-11.0)


 


Red Blood Count


 


 


 


 4.37 x10^6/uL


(4.30-5.70)


 


Hemoglobin


 


 


 


 14.3 g/dL


(13.0-17.5)


 


Hematocrit


 


 


 


 40.8 %


(39.0-53.0)


 


Mean Corpuscular Volume    93 fL () 


 


Mean Corpuscular Hemoglobin    33 pg (25-35) 


 


Mean Corpuscular Hemoglobin


Concent 


 


 


 35 g/dL


(31-37)


 


Red Cell Distribution Width


 


 


 


 13.3 %


(11.5-14.5)


 


Platelet Count


 


 


 


 275 x10^3/uL


(140-400)


 


Neutrophils (%) (Auto)    63 % (31-73) 


 


Lymphocytes (%) (Auto)    27 % (24-48) 


 


Monocytes (%) (Auto)    8 % (0-9) 


 


Eosinophils (%) (Auto)    2 % (0-3) 


 


Basophils (%) (Auto)    1 % (0-3) 


 


Neutrophils # (Auto)


 


 


 


 5.5 x10^3/uL


(1.8-7.7)


 


Lymphocytes # (Auto)


 


 


 


 2.3 x10^3/uL


(1.0-4.8)


 


Monocytes # (Auto)


 


 


 


 0.7 x10^3/uL


(0.0-1.1)


 


Eosinophils # (Auto)


 


 


 


 0.1 x10^3/uL


(0.0-0.7)


 


Basophils # (Auto)


 


 


 


 0.1 x10^3/uL


(0.0-0.2)


 


Sodium Level


 


 


 


 142 mmol/L


(136-145)


 


Potassium Level


 


 


 


 3.3 mmol/L


(3.5-5.1)


 


Chloride Level


 


 


 


 105 mmol/L


()


 


Carbon Dioxide Level


 


 


 


 24 mmol/L


(21-32)


 


Anion Gap    13 (6-14) 


 


Blood Urea Nitrogen    8 mg/dL (8-26) 


 


Creatinine


 


 


 


 1.1 mg/dL


(0.7-1.3)


 


Estimated GFR


(Cockcroft-Gault) 


 


 


 72.1 





 


BUN/Creatinine Ratio    7 (6-20) 


 


Glucose Level


 


 


 


 209 mg/dL


(70-99)


 


Calcium Level


 


 


 


 8.0 mg/dL


(8.5-10.1)


 


Phosphorus Level


 


 


 


 1.9 mg/dL


(2.6-4.7)


 


Total Bilirubin


 


 


 


 0.8 mg/dL


(0.2-1.0)


 


Aspartate Amino Transf


(AST/SGOT) 


 


 


 34 U/L (15-37) 





 


Alanine Aminotransferase


(ALT/SGPT) 


 


 


 34 U/L (16-63) 





 


Alkaline Phosphatase


 


 


 


 63 U/L


()


 


Total Protein


 


 


 


 7.1 g/dL


(6.4-8.2)


 


Albumin


 


 


 


 3.4 g/dL


(3.4-5.0)


 


Albumin/Globulin Ratio    0.9 (1.0-1.7) 


 


Test


 1/13/21


07:40 1/13/21


11:19 


 





 


Glucose (Fingerstick)


 211 mg/dL


(70-99) 221 mg/dL


(70-99) 


 











Laboratory Tests








Test


 1/12/21


16:29 1/12/21


19:56 1/13/21


07:28 1/13/21


07:40


 


Glucose (Fingerstick)


 181 mg/dL


(70-99) 197 mg/dL


(70-99) 


 211 mg/dL


(70-99)


 


White Blood Count


 


 


 8.6 x10^3/uL


(4.0-11.0) 





 


Red Blood Count


 


 


 4.37 x10^6/uL


(4.30-5.70) 





 


Hemoglobin


 


 


 14.3 g/dL


(13.0-17.5) 





 


Hematocrit


 


 


 40.8 %


(39.0-53.0) 





 


Mean Corpuscular Volume   93 fL ()  


 


Mean Corpuscular Hemoglobin   33 pg (25-35)  


 


Mean Corpuscular Hemoglobin


Concent 


 


 35 g/dL


(31-37) 





 


Red Cell Distribution Width


 


 


 13.3 %


(11.5-14.5) 





 


Platelet Count


 


 


 275 x10^3/uL


(140-400) 





 


Neutrophils (%) (Auto)   63 % (31-73)  


 


Lymphocytes (%) (Auto)   27 % (24-48)  


 


Monocytes (%) (Auto)   8 % (0-9)  


 


Eosinophils (%) (Auto)   2 % (0-3)  


 


Basophils (%) (Auto)   1 % (0-3)  


 


Neutrophils # (Auto)


 


 


 5.5 x10^3/uL


(1.8-7.7) 





 


Lymphocytes # (Auto)


 


 


 2.3 x10^3/uL


(1.0-4.8) 





 


Monocytes # (Auto)


 


 


 0.7 x10^3/uL


(0.0-1.1) 





 


Eosinophils # (Auto)


 


 


 0.1 x10^3/uL


(0.0-0.7) 





 


Basophils # (Auto)


 


 


 0.1 x10^3/uL


(0.0-0.2) 





 


Sodium Level


 


 


 142 mmol/L


(136-145) 





 


Potassium Level


 


 


 3.3 mmol/L


(3.5-5.1) 





 


Chloride Level


 


 


 105 mmol/L


() 





 


Carbon Dioxide Level


 


 


 24 mmol/L


(21-32) 





 


Anion Gap   13 (6-14)  


 


Blood Urea Nitrogen   8 mg/dL (8-26)  


 


Creatinine


 


 


 1.1 mg/dL


(0.7-1.3) 





 


Estimated GFR


(Cockcroft-Gault) 


 


 72.1 


 





 


BUN/Creatinine Ratio   7 (6-20)  


 


Glucose Level


 


 


 209 mg/dL


(70-99) 





 


Calcium Level


 


 


 8.0 mg/dL


(8.5-10.1) 





 


Phosphorus Level


 


 


 1.9 mg/dL


(2.6-4.7) 





 


Total Bilirubin


 


 


 0.8 mg/dL


(0.2-1.0) 





 


Aspartate Amino Transf


(AST/SGOT) 


 


 34 U/L (15-37) 


 





 


Alanine Aminotransferase


(ALT/SGPT) 


 


 34 U/L (16-63) 


 





 


Alkaline Phosphatase


 


 


 63 U/L


() 





 


Total Protein


 


 


 7.1 g/dL


(6.4-8.2) 





 


Albumin


 


 


 3.4 g/dL


(3.4-5.0) 





 


Albumin/Globulin Ratio   0.9 (1.0-1.7)  


 


Test


 1/13/21


11:19 


 


 





 


Glucose (Fingerstick)


 221 mg/dL


(70-99) 


 


 











Allergies





                                    Allergies








Coded Allergies Type Severity Reaction Last Updated Verified


 


  No Known Drug Allergies    10/2/18 No








Disposition/Orders:  D/C to Home





Justicifation of Admission Dx:


Justifications for Admission:


Justification of Admission Dx:  Yes











MJ KNOWLES MD          Jan 13, 2021 13:37

## 2021-01-13 NOTE — PDOC
Date of Service:


DATE: 1/13/21 


TIME: 10:03





Subjective:


Subjective:


Feels much better, would like to go home.  No GI complaints.


Says his kids had the same Chinese food he ate on Saturday night and he found 

out yesterday that they were sick also.


Still asking about home meds - names Zyrtec, cholesterol pill, and anxiety pill 

that starts with an L.





Objective:


Vital Signs:





                                   Vital Signs








  Date Time  Temp Pulse Resp B/P (MAP) Pulse Ox O2 Delivery O2 Flow Rate FiO2


 


1/13/21 08:13  110  151/106    


 


1/13/21 07:00 98.3  21  97 Room Air  





 98.3       








Labs:





Laboratory Tests








Test


 1/12/21


11:26 1/12/21


16:29 1/12/21


19:56 1/13/21


07:28


 


Glucose (Fingerstick) 252 mg/dL  181 mg/dL  197 mg/dL  


 


White Blood Count    8.6 x10^3/uL 


 


Red Blood Count    4.37 x10^6/uL 


 


Hemoglobin    14.3 g/dL 


 


Hematocrit    40.8 % 


 


Mean Corpuscular Volume    93 fL 


 


Mean Corpuscular Hemoglobin    33 pg 


 


Mean Corpuscular Hemoglobin


Concent 


 


 


 35 g/dL 





 


Red Cell Distribution Width    13.3 % 


 


Platelet Count    275 x10^3/uL 


 


Neutrophils (%) (Auto)    63 % 


 


Lymphocytes (%) (Auto)    27 % 


 


Monocytes (%) (Auto)    8 % 


 


Eosinophils (%) (Auto)    2 % 


 


Basophils (%) (Auto)    1 % 


 


Neutrophils # (Auto)    5.5 x10^3/uL 


 


Lymphocytes # (Auto)    2.3 x10^3/uL 


 


Monocytes # (Auto)    0.7 x10^3/uL 


 


Eosinophils # (Auto)    0.1 x10^3/uL 


 


Basophils # (Auto)    0.1 x10^3/uL 


 


Sodium Level    142 mmol/L 


 


Potassium Level    3.3 mmol/L 


 


Chloride Level    105 mmol/L 


 


Carbon Dioxide Level    24 mmol/L 


 


Anion Gap    13 


 


Blood Urea Nitrogen    8 mg/dL 


 


Creatinine    1.1 mg/dL 


 


Estimated GFR


(Cockcroft-Gault) 


 


 


 72.1 





 


BUN/Creatinine Ratio    7 


 


Glucose Level    209 mg/dL 


 


Calcium Level    8.0 mg/dL 


 


Total Bilirubin    0.8 mg/dL 


 


Aspartate Amino Transf


(AST/SGOT) 


 


 


 34 U/L 





 


Alanine Aminotransferase


(ALT/SGPT) 


 


 


 34 U/L 





 


Alkaline Phosphatase    63 U/L 


 


Total Protein    7.1 g/dL 


 


Albumin    3.4 g/dL 


 


Albumin/Globulin Ratio    0.9 


 


Test


 1/13/21


07:40 


 


 





 


Glucose (Fingerstick) 211 mg/dL    











PE:





GEN: NAD


LUNGS: CTAB


HEART: RRR


ABD: NABS, S/ND/NT


NEURO/PSYCH: A & O 3





A/P:


Chills/sweats, vomiting - resolved - ?food poisoning


DM, GERD, +cannabinoids





--


Dc per primary.  Defer pt's concerns re: home meds to them.


Follow-up for outpt EGD - our office will call to arrange.





Justicifation of Admission Dx:


Justifications for Admission:


Justification of Admission Dx:  Yes











JUS BURCH         Jan 13, 2021 10:06

## 2021-09-09 ENCOUNTER — HOSPITAL ENCOUNTER (OUTPATIENT)
Dept: HOSPITAL 61 - RT | Age: 46
End: 2021-09-09
Attending: NURSE PRACTITIONER
Payer: COMMERCIAL

## 2021-09-09 DIAGNOSIS — G47.19: ICD-10-CM

## 2021-09-09 DIAGNOSIS — G47.33: Primary | ICD-10-CM

## 2021-09-10 NOTE — SLEEP
DATE OF STUDY: 09/09/2021

HOME SLEEP STUDY



The patient is a 46-year-old who weighs 225 pounds with a BMI of 28.1.  The 

patient's Elgin score was 19.  The patient underwent home sleep study 

performed at [facility].  Total recording time was 506 minutes.



During the night study, the patient had 56 obstructive apneas, 11 central 

apneas, 32 mixed apneas and 80 hypopneas.  The patient's AHI was 37.8 per hour.



Nocturnal oximetry study revealed an average oxygen saturation 91% with a lowest

of 83%.  115 minutes were spent with oxygen saturation less than 90%.



Mean heart rate of 56 beats per minute with a maximum of 84 beats per minute.



IMPRESSION:

1.  Severe obstructive sleep apnea at an AHI of 37.8 per hour.

2.  Nocturnal hypoxia secondary to obstructive sleep apnea.



RECOMMENDATIONS:

1.  The patient will benefit from return to the sleep lab for CPAP titration 

study.  Alternatively, auto CPAP can be arranged.

2.  Once the patient is optimally treated with CPAP, then follow up in 4-6 weeks

to assess compliance and to document clinical improvement.

3.  Weight loss is advised.

4.  Avoid CNS depressants.

5.  Cautioned regarding driving until symptoms of sleep apnea resolved with the 

use of CPAP.







DEL

DR: Ori   DD: 09/10/2021 10:47

DT: 09/10/2021 12:09   TID: [id number]

CC:     RIDDHI JIMENEZ

## 2021-10-11 ENCOUNTER — HOSPITAL ENCOUNTER (OUTPATIENT)
Dept: HOSPITAL 61 - LAB | Age: 46
End: 2021-10-11
Attending: SURGERY
Payer: COMMERCIAL

## 2021-10-11 DIAGNOSIS — Z20.822: ICD-10-CM

## 2021-10-11 DIAGNOSIS — K80.20: ICD-10-CM

## 2021-10-11 DIAGNOSIS — Z01.812: Primary | ICD-10-CM

## 2021-10-11 PROCEDURE — U0003 INFECTIOUS AGENT DETECTION BY NUCLEIC ACID (DNA OR RNA); SEVERE ACUTE RESPIRATORY SYNDROME CORONAVIRUS 2 (SARS-COV-2) (CORONAVIRUS DISEASE [COVID-19]), AMPLIFIED PROBE TECHNIQUE, MAKING USE OF HIGH THROUGHPUT TECHNOLOGIES AS DESCRIBED BY CMS-2020-01-R: HCPCS

## 2021-10-13 ENCOUNTER — HOSPITAL ENCOUNTER (OUTPATIENT)
Dept: HOSPITAL 61 - SURG | Age: 46
Discharge: HOME | End: 2021-10-13
Attending: SURGERY
Payer: COMMERCIAL

## 2021-10-13 VITALS — WEIGHT: 225.31 LBS | HEIGHT: 74 IN | BODY MASS INDEX: 28.92 KG/M2

## 2021-10-13 VITALS — SYSTOLIC BLOOD PRESSURE: 124 MMHG | DIASTOLIC BLOOD PRESSURE: 72 MMHG

## 2021-10-13 DIAGNOSIS — E11.9: ICD-10-CM

## 2021-10-13 DIAGNOSIS — I10: ICD-10-CM

## 2021-10-13 DIAGNOSIS — G47.30: ICD-10-CM

## 2021-10-13 DIAGNOSIS — Z98.890: ICD-10-CM

## 2021-10-13 DIAGNOSIS — M19.90: ICD-10-CM

## 2021-10-13 DIAGNOSIS — K21.9: ICD-10-CM

## 2021-10-13 DIAGNOSIS — Z72.89: ICD-10-CM

## 2021-10-13 DIAGNOSIS — K80.10: Primary | ICD-10-CM

## 2021-10-13 DIAGNOSIS — Z79.899: ICD-10-CM

## 2021-10-13 DIAGNOSIS — F41.9: ICD-10-CM

## 2021-10-13 DIAGNOSIS — Z87.891: ICD-10-CM

## 2021-10-13 DIAGNOSIS — E78.00: ICD-10-CM

## 2021-10-13 DIAGNOSIS — Z79.4: ICD-10-CM

## 2021-10-13 PROCEDURE — 47563 LAPARO CHOLECYSTECTOMY/GRAPH: CPT

## 2021-10-13 PROCEDURE — 74300 X-RAY BILE DUCTS/PANCREAS: CPT

## 2021-10-13 PROCEDURE — 88304 TISSUE EXAM BY PATHOLOGIST: CPT

## 2021-10-13 PROCEDURE — C1887 CATHETER, GUIDING: HCPCS

## 2021-10-13 PROCEDURE — A4213 20+ CC SYRINGE ONLY: HCPCS

## 2021-10-13 PROCEDURE — 82962 GLUCOSE BLOOD TEST: CPT

## 2021-10-13 PROCEDURE — A4930 STERILE, GLOVES PER PAIR: HCPCS

## 2021-10-13 RX ADMIN — FENTANYL CITRATE PRN MCG: 50 INJECTION INTRAMUSCULAR; INTRAVENOUS at 11:36

## 2021-10-13 RX ADMIN — INSULIN LISPRO PRN UNIT: 100 INJECTION, SOLUTION INTRAVENOUS; SUBCUTANEOUS at 07:58

## 2021-10-13 RX ADMIN — MORPHINE SULFATE PRN MG: 2 INJECTION, SOLUTION INTRAMUSCULAR; INTRAVENOUS at 11:37

## 2021-10-13 RX ADMIN — FENTANYL CITRATE PRN MCG: 50 INJECTION INTRAMUSCULAR; INTRAVENOUS at 11:47

## 2021-10-13 RX ADMIN — INSULIN LISPRO PRN UNIT: 100 INJECTION, SOLUTION INTRAVENOUS; SUBCUTANEOUS at 11:30

## 2021-10-13 RX ADMIN — MORPHINE SULFATE PRN MG: 2 INJECTION, SOLUTION INTRAMUSCULAR; INTRAVENOUS at 11:48

## 2021-10-13 NOTE — PDOC
SURGICAL PROGRESS NOTE


DATE: 10/13/21 


TIME: 09:38


Subjective


Pre-Op Note


45 yo M with symptomatic cholelithiasis.


TO OR for laparoscopic cholecystectomy with cholangiogram.


R/R/B/A d/w pt.  Risks, including, but not limited to:  bleeding, infection, 

damage to surrounding structures, risk of anesthesia.


He appears to understand, his questions are answered and he elects to proceed.


Office note H&P reviewed and unchanged.


Vital Signs





Vital Signs








  Date Time  Temp Pulse Resp B/P (MAP) Pulse Ox O2 Delivery O2 Flow Rate FiO2


 


10/13/21 07:47 97.4 64 20 122/74 98   





 97.4       








Labs





Laboratory Tests








Test


 10/13/21


07:52


 


Glucose (Fingerstick)


 179 mg/dL


(70-99)








Laboratory Tests








Test


 10/13/21


07:52


 


Glucose (Fingerstick)


 179 mg/dL


(70-99)











Justicifation of Admission Dx:


Justifications for Admission:


Justification of Admission Dx:  Yes











DANIELITO ECKERT MD             Oct 13, 2021 09:48

## 2021-10-13 NOTE — PDOC4
OPERATIVE NOTE


Date:


Date:  Oct 13, 2021





Pre-Op Diagnosis:


Symptomatic cholelithiasis





Post-Op Diagnosis:


Calculous cholecystitis





Procedure Performed:


laparoscopic cholecystectomy with cholangiogram





Surgeon:


Rohan Eckert





Anesthesia Type:


GETA plus local





Blood Loss:


50





Specimans Obtained:


gallbladder





Findings:


normal liver and other viscera, gallbladder with adhesions, normal 

cholangiogram, debris in cystic duct





Complications:


none





Operative Note:


After obtaining informed consent, patient was taken to OR, induced under GETA 

and prepped in the usual fashion.  5 mm ports placed umbilical and RUQ, 12 port 

placed epigastric, all under laparoscopic guidance.  Abdominal cavity was 

explored and noted as above.  Adhesions to gallbladder taken down sharply.  

Gallbladder taken off fossa using cautery in dome down fashion.  Cystic artery 

ligated and divided with clips.  Cystic duct only structure remaining.  

Extensive debris in the cystic duct evacuated.  Cholangiogram was obtained and 

was normal.  Cystic duct controlled with hemolok and clips.  Gallbladder placed 

in bag, delivered and sent to pathology.  Copious irrigation.  No evidence of 

bleeding or other pathology.  Ports removed without bleeding.  Fascia repaired 

with 0 vicryl.  Skin repaired with 4 0 monocryl.  Dressing placed.





Patient tolerated procedure well and sent to PACU in stable condition.  All 

counts correct.  Wound class is 3.











DANIELITO ECKERT MD             Oct 13, 2021 12:10

## 2021-10-13 NOTE — RAD
INDICATION: Reason: CHOLANGIOGRAMS IN OR WITH C-ARM,FL TIME =.2 MIN,2 IMAGES SENT / Spl. Instructions
:  / History: . Fluoro for procedure.



IMPRESSION: 

Fluoroscopy was utilized by the clinical service to assist with their procedure.

There are 2 saved images/series. 

The limited saved images show contrast opacification of the cystic duct, intrahepatic ducts, common b
ile duct and duodenum. No filling defect is identified. Right upper quadrant cholecystectomy clips.

0.2 minutes of fluoroscopy time was used.



This dictation is for the usage of fluoroscopy only.

Please see the clinical service's procedure note for detail on the procedure.



Electronically signed by: Vernon Rock MD (10/13/2021 11:20 AM) [id number]

## 2021-10-15 NOTE — PATHOLOGY
[facility] Accession Number: [id number]

.                                                                01

Material submitted:                                        .

gallbladder - GALLBLADDER AND CONTENTS

.                                                                01

Clinical history:                                          .

CALCULUS CHOLECYSTITIS

L/S VS. OPEN CHOLECYSTECTOMY WITH GRAMS

CHOLELITHIASIS

.                                                                02

**********************************************************************

Diagnosis:

Gallbladder, cholecystectomy:

- Cholelithiasis.

- Chronic cholecystitis.

- Benign polyp of gallbladder.

- Reactive changes and focal lipogranulomata of gallbladder neck lymph

node.

(JPM:jo; 10/15/2021)

S  10/15/2021  0903 Local

**********************************************************************

.                                                                02

Comment:

There is no evidence of malignancy.

(JPM:jo; 10/15/2021)

.                                                                02

Electronically signed:                                     .

Giovani Rivera MD, Pathologist

NPI- [id number]

.                                                                01

Gross description:                                         .

Fixative: Formalin

Labeled: Gallbladder and contents

Specimen received: Intact gallbladder

Dimensions: 6.8 x 2.7 x 2.5 cm

Serosa: Light tan-gray

Lymph node: Possible lymph node found near cystic duct measuring 1.0 x 0.6

x 0.7 cm

Mucosa: Velvety and bile-stained with a possible polyp measuring 0.1 cm

near fundus

Average wall thickness: Up to 0.2 cm

Calculi: Present, black and friable

Abnormalities: None identified

.

A1- Representative body, fundus with possible polyp, and the cystic duct

margin.

A2- Entirely submitted lymph node, bisected

([facility]; 10/14/2021)

[facility]  10/14/2021  1149 Local

.                                                                02

Pathologist provided ICD-10:

K80.10, K82.4

.                                                                02

CPT                                                        .

846021

Specimen Comment: A courtesy copy of this report has been sent to [phone], [phone]

Specimen Comment: [id number]

Specimen Comment: Report sent to  / DR LOWERY

***Performed at:  01

   [facility], [place]  [id number]

   MD Aren Godoy MD Phone:  [phone]

***Performed at:  02

   [facility]

   [address] [place], [place]  [id number]

   MD Giovani Rivera MD Phone:  [phone]

## 2023-09-28 NOTE — RAD
Examination: PORTABLE CHEST 1V

 

History: Pt states having some trouble breathing, just not feeling well, 

weakness since Sun,. 

 

Comparison/Correlation: 11/29/2009 two-view chest x-ray exam

 

Findings: Portable frontal view chest was obtained. Heart size and 

pulmonary vasculature are normal. No infiltrate, pneumothorax, or 

significant pleural effusion. Left costophrenic angle was not fully 

included on this image mildly limiting assessment. Bony structures are 

unremarkable.

 

 

Impression:

No active disease.

 

Electronically signed by: Alfa Fernandes MD (10/2/2018 10:33 AM) [id number] No